# Patient Record
Sex: MALE | Race: BLACK OR AFRICAN AMERICAN | Employment: UNEMPLOYED | URBAN - METROPOLITAN AREA
[De-identification: names, ages, dates, MRNs, and addresses within clinical notes are randomized per-mention and may not be internally consistent; named-entity substitution may affect disease eponyms.]

---

## 2017-02-03 ENCOUNTER — HOSPITAL ENCOUNTER (INPATIENT)
Age: 67
LOS: 4 days | Discharge: HOME HEALTH | DRG: 861 | End: 2017-02-08
Attending: EMERGENCY MEDICINE | Admitting: FAMILY MEDICINE
Payer: MEDICAID

## 2017-02-03 ENCOUNTER — APPOINTMENT (OUTPATIENT)
Dept: GENERAL RADIOLOGY | Age: 67
DRG: 861 | End: 2017-02-03
Attending: EMERGENCY MEDICINE
Payer: MEDICAID

## 2017-02-03 ENCOUNTER — APPOINTMENT (OUTPATIENT)
Dept: CT IMAGING | Age: 67
DRG: 861 | End: 2017-02-03
Attending: EMERGENCY MEDICINE
Payer: MEDICAID

## 2017-02-03 DIAGNOSIS — R41.89 UNRESPONSIVENESS: Primary | ICD-10-CM

## 2017-02-03 LAB
ALBUMIN SERPL BCP-MCNC: 3.1 G/DL (ref 3.5–5)
ALBUMIN/GLOB SERPL: 0.7 {RATIO} (ref 1.1–2.2)
ALP SERPL-CCNC: 144 U/L (ref 45–117)
ALT SERPL-CCNC: 21 U/L (ref 12–78)
AMMONIA PLAS-SCNC: 21 UMOL/L
ANION GAP BLD CALC-SCNC: 9 MMOL/L (ref 5–15)
AST SERPL W P-5'-P-CCNC: 20 U/L (ref 15–37)
BASOPHILS # BLD AUTO: 0 K/UL (ref 0–0.1)
BASOPHILS # BLD: 0 % (ref 0–1)
BILIRUB SERPL-MCNC: 0.3 MG/DL (ref 0.2–1)
BUN SERPL-MCNC: 17 MG/DL (ref 6–20)
BUN/CREAT SERPL: 13 (ref 12–20)
CALCIUM SERPL-MCNC: 9 MG/DL (ref 8.5–10.1)
CHLORIDE SERPL-SCNC: 106 MMOL/L (ref 97–108)
CK SERPL-CCNC: 195 U/L (ref 39–308)
CO2 SERPL-SCNC: 26 MMOL/L (ref 21–32)
CREAT SERPL-MCNC: 1.29 MG/DL (ref 0.7–1.3)
EOSINOPHIL # BLD: 0.1 K/UL (ref 0–0.4)
EOSINOPHIL NFR BLD: 1 % (ref 0–7)
ERYTHROCYTE [DISTWIDTH] IN BLOOD BY AUTOMATED COUNT: 12.8 % (ref 11.5–14.5)
ETHANOL SERPL-MCNC: <10 MG/DL
GLOBULIN SER CALC-MCNC: 4.2 G/DL (ref 2–4)
GLUCOSE BLD STRIP.AUTO-MCNC: 120 MG/DL (ref 65–100)
GLUCOSE SERPL-MCNC: 128 MG/DL (ref 65–100)
HCT VFR BLD AUTO: 36.1 % (ref 36.6–50.3)
HGB BLD-MCNC: 12.1 G/DL (ref 12.1–17)
LACTATE SERPL-SCNC: 2.3 MMOL/L (ref 0.4–2)
LYMPHOCYTES # BLD AUTO: 34 % (ref 12–49)
LYMPHOCYTES # BLD: 3.6 K/UL (ref 0.8–3.5)
MCH RBC QN AUTO: 31.5 PG (ref 26–34)
MCHC RBC AUTO-ENTMCNC: 33.5 G/DL (ref 30–36.5)
MCV RBC AUTO: 94 FL (ref 80–99)
MONOCYTES # BLD: 1 K/UL (ref 0–1)
MONOCYTES NFR BLD AUTO: 10 % (ref 5–13)
NEUTS SEG # BLD: 5.6 K/UL (ref 1.8–8)
NEUTS SEG NFR BLD AUTO: 55 % (ref 32–75)
PLATELET # BLD AUTO: 151 K/UL (ref 150–400)
POTASSIUM SERPL-SCNC: 4.1 MMOL/L (ref 3.5–5.1)
PROT SERPL-MCNC: 7.3 G/DL (ref 6.4–8.2)
RBC # BLD AUTO: 3.84 M/UL (ref 4.1–5.7)
SERVICE CMNT-IMP: ABNORMAL
SODIUM SERPL-SCNC: 141 MMOL/L (ref 136–145)
TROPONIN I SERPL-MCNC: <0.04 NG/ML
VALPROATE SERPL-MCNC: 81 UG/ML (ref 50–100)
WBC # BLD AUTO: 10.4 K/UL (ref 4.1–11.1)

## 2017-02-03 PROCEDURE — 84484 ASSAY OF TROPONIN QUANT: CPT | Performed by: EMERGENCY MEDICINE

## 2017-02-03 PROCEDURE — 80307 DRUG TEST PRSMV CHEM ANLYZR: CPT | Performed by: EMERGENCY MEDICINE

## 2017-02-03 PROCEDURE — 85025 COMPLETE CBC W/AUTO DIFF WBC: CPT | Performed by: EMERGENCY MEDICINE

## 2017-02-03 PROCEDURE — 74011250636 HC RX REV CODE- 250/636: Performed by: EMERGENCY MEDICINE

## 2017-02-03 PROCEDURE — 96360 HYDRATION IV INFUSION INIT: CPT

## 2017-02-03 PROCEDURE — 80164 ASSAY DIPROPYLACETIC ACD TOT: CPT | Performed by: EMERGENCY MEDICINE

## 2017-02-03 PROCEDURE — 82962 GLUCOSE BLOOD TEST: CPT

## 2017-02-03 PROCEDURE — 70450 CT HEAD/BRAIN W/O DYE: CPT

## 2017-02-03 PROCEDURE — 82140 ASSAY OF AMMONIA: CPT | Performed by: EMERGENCY MEDICINE

## 2017-02-03 PROCEDURE — 83605 ASSAY OF LACTIC ACID: CPT | Performed by: EMERGENCY MEDICINE

## 2017-02-03 PROCEDURE — 36415 COLL VENOUS BLD VENIPUNCTURE: CPT | Performed by: EMERGENCY MEDICINE

## 2017-02-03 PROCEDURE — 81001 URINALYSIS AUTO W/SCOPE: CPT | Performed by: EMERGENCY MEDICINE

## 2017-02-03 PROCEDURE — 93005 ELECTROCARDIOGRAM TRACING: CPT

## 2017-02-03 PROCEDURE — 82550 ASSAY OF CK (CPK): CPT | Performed by: EMERGENCY MEDICINE

## 2017-02-03 PROCEDURE — 99285 EMERGENCY DEPT VISIT HI MDM: CPT

## 2017-02-03 PROCEDURE — 96361 HYDRATE IV INFUSION ADD-ON: CPT

## 2017-02-03 PROCEDURE — 71010 XR CHEST PORT: CPT

## 2017-02-03 PROCEDURE — 80053 COMPREHEN METABOLIC PANEL: CPT | Performed by: EMERGENCY MEDICINE

## 2017-02-03 RX ADMIN — SODIUM CHLORIDE 1000 ML: 900 INJECTION, SOLUTION INTRAVENOUS at 20:50

## 2017-02-03 NOTE — IP AVS SNAPSHOT
[address] 
[phone] Patient: Margarita Ortega MRN: [id number] XTZ:8[id number] You are allergic to the following Allergen Reactions Thorazine (Chlorpromazine) Other (comments) \"burns head and throat\" Recent Documentation Height Weight BMI Smoking Status 1.727 m 85 kg 28.49 kg/m2 Current Every Day Smoker Emergency Contacts Name Discharge Info Relation Home Work Mobile [address]  CAREGIVER [3] Sister [23] [phone] Princess Rod  Other Relative [6]   [phone] Fabrizio Lara  Brother [24]   [phone] Martell Byrnes (In [place])  Sister [23]   [phone] About your hospitalization You were admitted on:  February 4, 2017 You last received care in the:  [facility] 6S NEURO-SCI TELE You were discharged on:  February 8, 2017 Unit phone number:  [phone] Why you were hospitalized Your primary diagnosis was: Altered Mental Status Your diagnoses also included:  Paranoid Schizophrenia (Hcc), Htn (Hypertension), Hld (Hyperlipidemia), Anemia, Unspecified, Diabetes Mellitus (Hcc), Seizure (Hcc) Providers Seen During Your Hospitalizations Provider Role Specialty Primary office phone Shante Massed. Quinton Vila MD Attending Provider Emergency Medicine [phone] Virgie Pantoja MD Attending Provider [facility] [phone] Nikole Young MD Attending Provider Internal Medicine [phone] Moises Case MD Attending Provider Internal Medicine [phone] Stefan Ferrell MD Attending Provider Internal Medicine [phone] Your Primary Care Physician (PCP) Primary Care Physician Office Phone Office Fax Beba Bridges [phone] Follow-up Information Follow up With Details Comments Contact Info [address] [facility] [address] 
[phone] Ag Kaye MD In 1 week  [address] [facility] [address] 
[phone] Today Current Discharge Medication List  
  
CONTINUE these medications which have NOT CHANGED Dose & Instructions Dispensing Information Comments Morning Noon Evening Bedtime  
 amLODIPine 10 mg tablet Commonly known as:  Izetta Harder Your next dose is: Today, Tomorrow Other:  _________ Dose:  10 mg Take 10 mg by mouth daily. Refills:  0  
     
   
   
   
  
 benztropine 1 mg tablet Commonly known as:  COGENTIN Your next dose is: Today, Tomorrow Other:  _________ Dose:  1 mg Take 1 mg by mouth every twelve (12) hours. Refills:  0  
     
   
   
   
  
 bisacodyl 10 mg suppository Commonly known as:  DULCOLAX Your next dose is: Today, Tomorrow Other:  _________ Dose:  10 mg Insert 10 mg into rectum daily as needed for Other (constipation). Quantity:  15 Suppository Refills:  0  
     
   
   
   
  
 COLACE 100 mg capsule Generic drug:  docusate sodium Your next dose is: Today, Tomorrow Other:  _________ Dose:  100 mg Take 100 mg by mouth daily. Refills:  0 DITROPAN XL 10 mg CR tablet Generic drug:  oxybutynin chloride XL Your next dose is: Today, Tomorrow Other:  _________ Dose:  10 mg Take 10 mg by mouth daily. Refills:  0  
     
   
   
   
  
 divalproex  mg ER tablet Commonly known as:  DEPAKOTE ER Your next dose is: Today, Tomorrow Other:  _________ Dose:  1000 mg Take 2 Tabs by mouth daily. Quantity:  90 Tab Refills:  0  
     
   
   
   
  
 ferrous sulfate 325 mg (65 mg iron) tablet Your next dose is: Today, Tomorrow Other:  _________ Dose:  325 mg Take 325 mg by mouth two (2) times a day. Refills:  0  
     
   
   
   
  
 FLOMAX 0.4 mg capsule Generic drug:  tamsulosin Your next dose is: Today, Tomorrow Other:  _________ Dose:  0.4 mg Take 0.4 mg by mouth daily. Refills:  0  
     
   
   
   
  
 haloperidol 5 mg tablet Commonly known as:  HALDOL Your next dose is: Today, Tomorrow Other:  _________ Dose:  5 mg Take 5 mg by mouth every twelve (12) hours. Refills:  0  
     
   
   
   
  
 insulin glargine 100 unit/mL injection Commonly known as:  LANTUS Your next dose is: Today, Tomorrow Other:  _________ Dose:  20 Units 20 Units by SubCUTAneous route nightly. Indications: TYPE 2 DIABETES MELLITUS Quantity:  1 Vial  
Refills:  0  
     
   
   
   
  
 lamoTRIgine 25 mg tablet Commonly known as: LaMICtal  
   
Your next dose is: Today, Tomorrow Other:  _________ Dose:  50 mg Take 50 mg by mouth two (2) times a day. Refills:  0  
     
   
   
   
  
 lisinopril 40 mg tablet Commonly known as:  Aundra Lloyd Your next dose is: Today, Tomorrow Other:  _________ Dose:  40 mg Take 40 mg by mouth daily. Refills:  0  
     
   
   
   
  
 metFORMIN 500 mg tablet Commonly known as:  GLUCOPHAGE Your next dose is: Today, Tomorrow Other:  _________ Dose:  500 mg Take 500 mg by mouth daily (with breakfast). Refills:  0  
     
   
   
   
  
 metoprolol tartrate 50 mg tablet Commonly known as:  LOPRESSOR Your next dose is: Today, Tomorrow Other:  _________ Dose:  75 mg Take 75 mg by mouth two (2) times a day. Refills:  0  
     
   
   
   
  
 multivitamin tablet Commonly known as:  ONE A DAY Your next dose is: Today, Tomorrow Other:  _________ Dose:  1 Tab Take 1 Tab by mouth daily. Refills:  0 OXcarbazepine 150 mg tablet Commonly known as:  TRILEPTAL Your next dose is: Today, Tomorrow Other:  _________ Dose:  150 mg Take 150 mg by mouth two (2) times a day. Refills:  0  
     
   
   
   
  
 pantoprazole 40 mg tablet Commonly known as:  PROTONIX Your next dose is: Today, Tomorrow Other:  _________ Dose:  40 mg Take 40 mg by mouth two (2) times a day. Refills:  0  
     
   
   
   
  
 * QUEtiapine 100 mg tablet Commonly known as:  SEROquel Your next dose is: Today, Tomorrow Other:  _________ Dose:  50 mg Take 50 mg by mouth daily. Refills:  0  
     
   
   
   
  
 * QUEtiapine 100 mg tablet Commonly known as:  SEROquel Your next dose is: Today, Tomorrow Other:  _________ Dose:  200 mg Take 200 mg by mouth nightly. Refills:  0  
     
   
   
   
  
 simvastatin 20 mg tablet Commonly known as:  ZOCOR Your next dose is: Today, Tomorrow Other:  _________ Dose:  20 mg Take 20 mg by mouth nightly. Refills:  0  
     
   
   
   
  
 * Notice: This list has 2 medication(s) that are the same as other medications prescribed for you. Read the directions carefully, and ask your doctor or other care provider to review them with you. Discharge Instructions Discharge Instructions PATIENT ID: Alvarado Laureano MRN: [id number] YOB: 1950 DATE OF ADMISSION: 2/3/2017  8:38 PM   
DATE OF DISCHARGE: 2/8/2017 PRIMARY CARE PROVIDER: Malaika Wise MD  
 
ATTENDING PHYSICIAN: Abby Hendrix MD 
DISCHARGING PROVIDER: Abby Hendrix MD   
To contact this individual call [phone] and ask the  to page. If unavailable ask to be transferred the Adult Hospitalist Department. DISCHARGE DIAGNOSES Altered mental status Seizure disorder DM-2 HTN 
  COPD Prior history of CVA CONSULTATIONS: IP CONSULT TO HOSPITALIST IP CONSULT TO NEUROLOGY PROCEDURES/SURGERIES: * No surgery found * PENDING TEST RESULTS:  
At the time of discharge the following test results are still pending: None. FOLLOW UP APPOINTMENTS:  
Follow-up Information Follow up With Details Comments Contact Info [address] [address] 
[phone] Lucía Bajwa MD In 1 week  [address] [facility] [address] 
[phone] Today ADDITIONAL CARE RECOMMENDATIONS: None 
  
DIET: Cardiac Diet and Diabetic Diet 
  
ACTIVITY: Activity as tolerated. No driving. 
  
WOUND CARE: None 
  
EQUIPMENT needed: None DISCHARGE MEDICATIONS: 
 See Medication Reconciliation Form · It is important that you take the medication exactly as they are prescribed. · Keep your medication in the bottles provided by the pharmacist and keep a list of the medication names, dosages, and times to be taken in your wallet. · Do not take other medications without consulting your doctor. NOTIFY YOUR PHYSICIAN FOR ANY OF THE FOLLOWING:  
Fever over 101 degrees for 24 hours. Chest pain, shortness of breath, fever, chills, nausea, vomiting, diarrhea, change in mentation, falling, weakness, bleeding. Severe pain or pain not relieved by medications. Or, any other signs or symptoms that you may have questions about. DISPOSITION: 
 Home With: 
 OT  PT  [place]  RN  
  
 SNF/Inpatient Rehab/LTAC Independent/assisted living Hospice  
x Other:  Group home CDMP Checked:  
Yes x PROBLEM LIST Updated: 
Yes x Signed:  
Fabiola Smith MD 
2/8/2017 
10:16 AM 
 
Discharge Orders None [facility] Announcement We are excited to announce that we are making your provider's discharge notes available to you in [facility].   You will see these notes when they are completed and signed by the physician that discharged you from your recent hospital stay. If you have any questions or concerns about any information you see in [facility], please call the Health Information Department where you were seen or reach out to your Primary Care Provider for more information about your plan of care. Introducing [facility]! [employer] introduces [facility] Connect patient portal. Now you can access parts of your medical record, email your doctor's office, and request medication refills online. 1. In your internet browser, go to [email or web address] 2. Click on the First Time User? Click Here link in the Sign In box. You will see the New Member Sign Up page. 3. Enter your [facility] Access Code exactly as it appears below. You will not need to use this code after youve completed the sign-up process. If you do not sign up before the expiration date, you must request a new code. · [facility] Access Code: [email or web address] Expires: 5/4/2017  8:58 PM 
 
4. Enter the last four digits of your Social Security Number (xxxx) and Date of Birth (mm/dd/yyyy) as indicated and click Submit. You will be taken to the next sign-up page. 5. Create a Care Team Connect ID. This will be your Care Team Connect login ID and cannot be changed, so think of one that is secure and easy to remember. 6. Create a [facility] password. You can change your password at any time. 7. Enter your Password Reset Question and Answer. This can be used at a later time if you forget your password. 8. Enter your e-mail address. You will receive e-mail notification when new information is available in [address] 9. Click Sign Up. You can now view and download portions of your medical record. 10. Click the Download Summary menu link to download a portable copy of your medical information. If you have questions, please visit the Frequently Asked Questions section of the [facility] website. Remember, [facility] Connect is NOT to be used for urgent needs. For medical emergencies, dial 911. Now available from your iPhone and Android! General Information Please provide this summary of care documentation to your next provider. Patient Signature:  ____________________________________________________________ Date:  ____________________________________________________________  
  
Althia Kras Provider Signature:  ____________________________________________________________ Date:  ____________________________________________________________

## 2017-02-03 NOTE — IP AVS SNAPSHOT
Current Discharge Medication List  
  
Take these medications at their scheduled times Dose & Instructions Dispensing Information Comments Morning Noon Evening Bedtime  
 amLODIPine 10 mg tablet Commonly known as:  Yosvany Duarte Your next dose is: Today, Tomorrow Other:  ____________ Dose:  10 mg Take 10 mg by mouth daily. Refills:  0  
     
   
   
   
  
 benztropine 1 mg tablet Commonly known as:  COGENTIN Your next dose is: Today, Tomorrow Other:  ____________ Dose:  1 mg Take 1 mg by mouth every twelve (12) hours. Refills:  0  
     
   
   
   
  
 COLACE 100 mg capsule Generic drug:  docusate sodium Your next dose is: Today, Tomorrow Other:  ____________ Dose:  100 mg Take 100 mg by mouth daily. Refills:  0 DITROPAN XL 10 mg CR tablet Generic drug:  oxybutynin chloride XL Your next dose is: Today, Tomorrow Other:  ____________ Dose:  10 mg Take 10 mg by mouth daily. Refills:  0  
     
   
   
   
  
 divalproex  mg ER tablet Commonly known as:  DEPAKOTE ER Your next dose is: Today, Tomorrow Other:  ____________ Dose:  1000 mg Take 2 Tabs by mouth daily. Quantity:  90 Tab Refills:  0  
     
   
   
   
  
 ferrous sulfate 325 mg (65 mg iron) tablet Your next dose is: Today, Tomorrow Other:  ____________ Dose:  325 mg Take 325 mg by mouth two (2) times a day. Refills:  0  
     
   
   
   
  
 FLOMAX 0.4 mg capsule Generic drug:  tamsulosin Your next dose is: Today, Tomorrow Other:  ____________ Dose:  0.4 mg Take 0.4 mg by mouth daily. Refills:  0  
     
   
   
   
  
 haloperidol 5 mg tablet Commonly known as:  HALDOL Your next dose is: Today, Tomorrow Other:  ____________ Dose:  5 mg Take 5 mg by mouth every twelve (12) hours. Refills:  0  
     
   
   
   
  
 insulin glargine 100 unit/mL injection Commonly known as:  LANTUS Your next dose is: Today, Tomorrow Other:  ____________ Dose:  20 Units 20 Units by SubCUTAneous route nightly. Indications: TYPE 2 DIABETES MELLITUS Quantity:  1 Vial  
Refills:  0  
     
   
   
   
  
 lamoTRIgine 25 mg tablet Commonly known as: LaMICtal  
   
Your next dose is: Today, Tomorrow Other:  ____________ Dose:  50 mg Take 50 mg by mouth two (2) times a day. Refills:  0  
     
   
   
   
  
 lisinopril 40 mg tablet Commonly known as:  Rehan Fan Your next dose is: Today, Tomorrow Other:  ____________ Dose:  40 mg Take 40 mg by mouth daily. Refills:  0  
     
   
   
   
  
 metFORMIN 500 mg tablet Commonly known as:  GLUCOPHAGE Your next dose is: Today, Tomorrow Other:  ____________ Dose:  500 mg Take 500 mg by mouth daily (with breakfast). Refills:  0  
     
   
   
   
  
 metoprolol tartrate 50 mg tablet Commonly known as:  LOPRESSOR Your next dose is: Today, Tomorrow Other:  ____________ Dose:  75 mg Take 75 mg by mouth two (2) times a day. Refills:  0  
     
   
   
   
  
 multivitamin tablet Commonly known as:  ONE A DAY Your next dose is: Today, Tomorrow Other:  ____________ Dose:  1 Tab Take 1 Tab by mouth daily. Refills:  0 OXcarbazepine 150 mg tablet Commonly known as:  TRILEPTAL Your next dose is: Today, Tomorrow Other:  ____________ Dose:  150 mg Take 150 mg by mouth two (2) times a day. Refills:  0  
     
   
   
   
  
 pantoprazole 40 mg tablet Commonly known as:  PROTONIX Your next dose is: Today, Tomorrow Other:  ____________  Dose:  40 mg  
 Take 40 mg by mouth two (2) times a day. Refills:  0  
     
   
   
   
  
 * QUEtiapine 100 mg tablet Commonly known as:  SEROquel Your next dose is: Today, Tomorrow Other:  ____________ Dose:  50 mg Take 50 mg by mouth daily. Refills:  0  
     
   
   
   
  
 * QUEtiapine 100 mg tablet Commonly known as:  SEROquel Your next dose is: Today, Tomorrow Other:  ____________ Dose:  200 mg Take 200 mg by mouth nightly. Refills:  0  
     
   
   
   
  
 simvastatin 20 mg tablet Commonly known as:  ZOCOR Your next dose is: Today, Tomorrow Other:  ____________ Dose:  20 mg Take 20 mg by mouth nightly. Refills:  0  
     
   
   
   
  
 * Notice: This list has 2 medication(s) that are the same as other medications prescribed for you. Read the directions carefully, and ask your doctor or other care provider to review them with you. Take these medications as needed Dose & Instructions Dispensing Information Comments Morning Noon Evening Bedtime  
 bisacodyl 10 mg suppository Commonly known as:  DULCOLAX Your next dose is: Today, Tomorrow Other:  ____________ Dose:  10 mg Insert 10 mg into rectum daily as needed for Other (constipation). Quantity:  15 Suppository Refills:  0

## 2017-02-04 PROBLEM — R41.82 ALTERED MENTAL STATUS: Status: ACTIVE | Noted: 2017-02-04

## 2017-02-04 LAB
AMMONIA PLAS-SCNC: 22 UMOL/L
AMPHET UR QL SCN: NEGATIVE
ANION GAP BLD CALC-SCNC: 9 MMOL/L (ref 5–15)
APAP SERPL-MCNC: 6 UG/ML (ref 10–30)
APPEARANCE UR: CLEAR
ATRIAL RATE: 66 BPM
BACTERIA URNS QL MICRO: NEGATIVE /HPF
BARBITURATES UR QL SCN: NEGATIVE
BASOPHILS # BLD AUTO: 0 K/UL (ref 0–0.1)
BASOPHILS # BLD: 0 % (ref 0–1)
BENZODIAZ UR QL: NEGATIVE
BILIRUB UR QL: NEGATIVE
BUN SERPL-MCNC: 18 MG/DL (ref 6–20)
BUN/CREAT SERPL: 16 (ref 12–20)
CALCIUM SERPL-MCNC: 8.9 MG/DL (ref 8.5–10.1)
CALCULATED P AXIS, ECG09: 47 DEGREES
CALCULATED R AXIS, ECG10: 43 DEGREES
CALCULATED T AXIS, ECG11: 63 DEGREES
CANNABINOIDS UR QL SCN: NEGATIVE
CHLORIDE SERPL-SCNC: 109 MMOL/L (ref 97–108)
CHOLEST SERPL-MCNC: 109 MG/DL
CO2 SERPL-SCNC: 28 MMOL/L (ref 21–32)
COCAINE UR QL SCN: NEGATIVE
COLOR UR: NORMAL
CREAT SERPL-MCNC: 1.13 MG/DL (ref 0.7–1.3)
DIAGNOSIS, 93000: NORMAL
DRUG SCRN COMMENT,DRGCM: NORMAL
EOSINOPHIL # BLD: 0.1 K/UL (ref 0–0.4)
EOSINOPHIL NFR BLD: 1 % (ref 0–7)
EPITH CASTS URNS QL MICRO: NORMAL /LPF
ERYTHROCYTE [DISTWIDTH] IN BLOOD BY AUTOMATED COUNT: 13 % (ref 11.5–14.5)
EST. AVERAGE GLUCOSE BLD GHB EST-MCNC: 120 MG/DL
GLUCOSE BLD STRIP.AUTO-MCNC: 103 MG/DL (ref 65–100)
GLUCOSE BLD STRIP.AUTO-MCNC: 135 MG/DL (ref 65–100)
GLUCOSE BLD STRIP.AUTO-MCNC: 143 MG/DL (ref 65–100)
GLUCOSE BLD STRIP.AUTO-MCNC: 60 MG/DL (ref 65–100)
GLUCOSE BLD STRIP.AUTO-MCNC: 76 MG/DL (ref 65–100)
GLUCOSE SERPL-MCNC: 144 MG/DL (ref 65–100)
GLUCOSE UR STRIP.AUTO-MCNC: NEGATIVE MG/DL
HBA1C MFR BLD: 5.8 % (ref 4.2–6.3)
HCT VFR BLD AUTO: 37.6 % (ref 36.6–50.3)
HDLC SERPL-MCNC: 39 MG/DL
HDLC SERPL: 2.8 {RATIO} (ref 0–5)
HGB BLD-MCNC: 12.4 G/DL (ref 12.1–17)
HGB UR QL STRIP: NEGATIVE
HYALINE CASTS URNS QL MICRO: NORMAL /LPF (ref 0–5)
KETONES UR QL STRIP.AUTO: NEGATIVE MG/DL
LACTATE SERPL-SCNC: 1.9 MMOL/L (ref 0.4–2)
LDLC SERPL CALC-MCNC: 50.8 MG/DL (ref 0–100)
LEUKOCYTE ESTERASE UR QL STRIP.AUTO: NEGATIVE
LIPID PROFILE,FLP: NORMAL
LYMPHOCYTES # BLD AUTO: 34 % (ref 12–49)
LYMPHOCYTES # BLD: 3.3 K/UL (ref 0.8–3.5)
MAGNESIUM SERPL-MCNC: 1.7 MG/DL (ref 1.6–2.4)
MCH RBC QN AUTO: 31 PG (ref 26–34)
MCHC RBC AUTO-ENTMCNC: 33 G/DL (ref 30–36.5)
MCV RBC AUTO: 94 FL (ref 80–99)
METHADONE UR QL: NEGATIVE
MONOCYTES # BLD: 0.8 K/UL (ref 0–1)
MONOCYTES NFR BLD AUTO: 8 % (ref 5–13)
NEUTS SEG # BLD: 5.6 K/UL (ref 1.8–8)
NEUTS SEG NFR BLD AUTO: 57 % (ref 32–75)
NITRITE UR QL STRIP.AUTO: NEGATIVE
OPIATES UR QL: NEGATIVE
P-R INTERVAL, ECG05: 116 MS
PCP UR QL: NEGATIVE
PH UR STRIP: 5.5 [PH] (ref 5–8)
PHOSPHATE SERPL-MCNC: 3.1 MG/DL (ref 2.6–4.7)
PLATELET # BLD AUTO: 162 K/UL (ref 150–400)
POTASSIUM SERPL-SCNC: 4 MMOL/L (ref 3.5–5.1)
PROT UR STRIP-MCNC: NEGATIVE MG/DL
Q-T INTERVAL, ECG07: 444 MS
QRS DURATION, ECG06: 74 MS
QTC CALCULATION (BEZET), ECG08: 465 MS
RBC # BLD AUTO: 4 M/UL (ref 4.1–5.7)
RBC #/AREA URNS HPF: NORMAL /HPF (ref 0–5)
SALICYLATES SERPL-MCNC: <1.7 MG/DL (ref 2.8–20)
SERVICE CMNT-IMP: ABNORMAL
SERVICE CMNT-IMP: NORMAL
SODIUM SERPL-SCNC: 146 MMOL/L (ref 136–145)
SP GR UR REFRACTOMETRY: 1.01 (ref 1–1.03)
T4 FREE SERPL-MCNC: 1.2 NG/DL (ref 0.8–1.5)
TRIGL SERPL-MCNC: 96 MG/DL (ref ?–150)
TROPONIN I SERPL-MCNC: <0.04 NG/ML
TSH SERPL DL<=0.05 MIU/L-ACNC: 1.18 UIU/ML (ref 0.36–3.74)
UROBILINOGEN UR QL STRIP.AUTO: 1 EU/DL (ref 0.2–1)
VENTRICULAR RATE, ECG03: 66 BPM
VLDLC SERPL CALC-MCNC: 19.2 MG/DL
WBC # BLD AUTO: 9.7 K/UL (ref 4.1–11.1)
WBC URNS QL MICRO: NORMAL /HPF (ref 0–4)

## 2017-02-04 PROCEDURE — 83605 ASSAY OF LACTIC ACID: CPT | Performed by: HOSPITALIST

## 2017-02-04 PROCEDURE — 36415 COLL VENOUS BLD VENIPUNCTURE: CPT | Performed by: FAMILY MEDICINE

## 2017-02-04 PROCEDURE — 83735 ASSAY OF MAGNESIUM: CPT | Performed by: FAMILY MEDICINE

## 2017-02-04 PROCEDURE — 97535 SELF CARE MNGMENT TRAINING: CPT

## 2017-02-04 PROCEDURE — 84484 ASSAY OF TROPONIN QUANT: CPT | Performed by: FAMILY MEDICINE

## 2017-02-04 PROCEDURE — 84100 ASSAY OF PHOSPHORUS: CPT | Performed by: FAMILY MEDICINE

## 2017-02-04 PROCEDURE — 80061 LIPID PANEL: CPT | Performed by: FAMILY MEDICINE

## 2017-02-04 PROCEDURE — 65660000000 HC RM CCU STEPDOWN

## 2017-02-04 PROCEDURE — 82962 GLUCOSE BLOOD TEST: CPT

## 2017-02-04 PROCEDURE — 74011250636 HC RX REV CODE- 250/636: Performed by: FAMILY MEDICINE

## 2017-02-04 PROCEDURE — 84439 ASSAY OF FREE THYROXINE: CPT | Performed by: FAMILY MEDICINE

## 2017-02-04 PROCEDURE — 80048 BASIC METABOLIC PNL TOTAL CA: CPT | Performed by: FAMILY MEDICINE

## 2017-02-04 PROCEDURE — 83036 HEMOGLOBIN GLYCOSYLATED A1C: CPT | Performed by: FAMILY MEDICINE

## 2017-02-04 PROCEDURE — 74011000250 HC RX REV CODE- 250: Performed by: FAMILY MEDICINE

## 2017-02-04 PROCEDURE — 82140 ASSAY OF AMMONIA: CPT | Performed by: FAMILY MEDICINE

## 2017-02-04 PROCEDURE — 97165 OT EVAL LOW COMPLEX 30 MIN: CPT

## 2017-02-04 PROCEDURE — 84443 ASSAY THYROID STIM HORMONE: CPT | Performed by: FAMILY MEDICINE

## 2017-02-04 PROCEDURE — 85025 COMPLETE CBC W/AUTO DIFF WBC: CPT | Performed by: FAMILY MEDICINE

## 2017-02-04 PROCEDURE — 95816 EEG AWAKE AND DROWSY: CPT | Performed by: INTERNAL MEDICINE

## 2017-02-04 PROCEDURE — 74011000258 HC RX REV CODE- 258: Performed by: INTERNAL MEDICINE

## 2017-02-04 PROCEDURE — 80307 DRUG TEST PRSMV CHEM ANLYZR: CPT | Performed by: FAMILY MEDICINE

## 2017-02-04 PROCEDURE — 74011250637 HC RX REV CODE- 250/637: Performed by: INTERNAL MEDICINE

## 2017-02-04 PROCEDURE — 97162 PT EVAL MOD COMPLEX 30 MIN: CPT

## 2017-02-04 RX ORDER — AMLODIPINE BESYLATE 5 MG/1
10 TABLET ORAL DAILY
Status: DISCONTINUED | OUTPATIENT
Start: 2017-02-04 | End: 2017-02-08 | Stop reason: HOSPADM

## 2017-02-04 RX ORDER — QUETIAPINE FUMARATE 100 MG/1
200 TABLET, FILM COATED ORAL
Status: DISCONTINUED | OUTPATIENT
Start: 2017-02-04 | End: 2017-02-04

## 2017-02-04 RX ORDER — INSULIN LISPRO 100 [IU]/ML
INJECTION, SOLUTION INTRAVENOUS; SUBCUTANEOUS
Status: DISCONTINUED | OUTPATIENT
Start: 2017-02-04 | End: 2017-02-08 | Stop reason: HOSPADM

## 2017-02-04 RX ORDER — SIMVASTATIN 20 MG/1
20 TABLET, FILM COATED ORAL
Status: DISCONTINUED | OUTPATIENT
Start: 2017-02-04 | End: 2017-02-08 | Stop reason: HOSPADM

## 2017-02-04 RX ORDER — SODIUM CHLORIDE 0.9 % (FLUSH) 0.9 %
5-10 SYRINGE (ML) INJECTION EVERY 8 HOURS
Status: DISCONTINUED | OUTPATIENT
Start: 2017-02-04 | End: 2017-02-08 | Stop reason: HOSPADM

## 2017-02-04 RX ORDER — LISINOPRIL 10 MG/1
40 TABLET ORAL DAILY
Status: DISCONTINUED | OUTPATIENT
Start: 2017-02-05 | End: 2017-02-08 | Stop reason: HOSPADM

## 2017-02-04 RX ORDER — SODIUM CHLORIDE 9 MG/ML
125 INJECTION, SOLUTION INTRAVENOUS CONTINUOUS
Status: DISCONTINUED | OUTPATIENT
Start: 2017-02-04 | End: 2017-02-04

## 2017-02-04 RX ORDER — LAMOTRIGINE 25 MG/1
50 TABLET ORAL 2 TIMES DAILY
Status: DISCONTINUED | OUTPATIENT
Start: 2017-02-04 | End: 2017-02-08 | Stop reason: HOSPADM

## 2017-02-04 RX ORDER — MAGNESIUM SULFATE 100 %
4 CRYSTALS MISCELLANEOUS AS NEEDED
Status: DISCONTINUED | OUTPATIENT
Start: 2017-02-04 | End: 2017-02-08 | Stop reason: HOSPADM

## 2017-02-04 RX ORDER — SODIUM CHLORIDE 450 MG/100ML
75 INJECTION, SOLUTION INTRAVENOUS CONTINUOUS
Status: DISCONTINUED | OUTPATIENT
Start: 2017-02-04 | End: 2017-02-08

## 2017-02-04 RX ORDER — QUETIAPINE FUMARATE 25 MG/1
50 TABLET, FILM COATED ORAL DAILY
Status: DISCONTINUED | OUTPATIENT
Start: 2017-02-04 | End: 2017-02-06

## 2017-02-04 RX ORDER — OXCARBAZEPINE 150 MG/1
150 TABLET, FILM COATED ORAL 2 TIMES DAILY
Status: DISCONTINUED | OUTPATIENT
Start: 2017-02-04 | End: 2017-02-08 | Stop reason: HOSPADM

## 2017-02-04 RX ORDER — SODIUM CHLORIDE 0.9 % (FLUSH) 0.9 %
5-10 SYRINGE (ML) INJECTION AS NEEDED
Status: DISCONTINUED | OUTPATIENT
Start: 2017-02-04 | End: 2017-02-08 | Stop reason: HOSPADM

## 2017-02-04 RX ORDER — DIVALPROEX SODIUM 500 MG/1
1000 TABLET, EXTENDED RELEASE ORAL DAILY
Status: DISCONTINUED | OUTPATIENT
Start: 2017-02-04 | End: 2017-02-08 | Stop reason: HOSPADM

## 2017-02-04 RX ORDER — DEXTROSE 50 % IN WATER (D50W) INTRAVENOUS SYRINGE
12.5-25 AS NEEDED
Status: DISCONTINUED | OUTPATIENT
Start: 2017-02-04 | End: 2017-02-08 | Stop reason: HOSPADM

## 2017-02-04 RX ADMIN — OXCARBAZEPINE 150 MG: 150 TABLET, FILM COATED ORAL at 12:47

## 2017-02-04 RX ADMIN — LAMOTRIGINE 50 MG: 25 TABLET ORAL at 12:46

## 2017-02-04 RX ADMIN — OXCARBAZEPINE 150 MG: 150 TABLET, FILM COATED ORAL at 20:24

## 2017-02-04 RX ADMIN — SODIUM CHLORIDE 75 ML/HR: 450 INJECTION, SOLUTION INTRAVENOUS at 17:20

## 2017-02-04 RX ADMIN — SIMVASTATIN 20 MG: 20 TABLET, FILM COATED ORAL at 23:32

## 2017-02-04 RX ADMIN — METOPROLOL TARTRATE 75 MG: 50 TABLET ORAL at 20:23

## 2017-02-04 RX ADMIN — DIVALPROEX SODIUM 1000 MG: 500 TABLET, FILM COATED, EXTENDED RELEASE ORAL at 12:49

## 2017-02-04 RX ADMIN — AMLODIPINE BESYLATE 10 MG: 5 TABLET ORAL at 12:46

## 2017-02-04 RX ADMIN — LAMOTRIGINE 50 MG: 25 TABLET ORAL at 20:23

## 2017-02-04 RX ADMIN — QUETIAPINE FUMARATE 50 MG: 25 TABLET, FILM COATED ORAL at 12:47

## 2017-02-04 RX ADMIN — Medication 10 ML: at 06:44

## 2017-02-04 RX ADMIN — Medication 5 ML: at 14:00

## 2017-02-04 RX ADMIN — DEXTROSE MONOHYDRATE 25 G: 25 INJECTION, SOLUTION INTRAVENOUS at 12:40

## 2017-02-04 RX ADMIN — SODIUM CHLORIDE 125 ML/HR: 900 INJECTION, SOLUTION INTRAVENOUS at 03:00

## 2017-02-04 NOTE — PROGRESS NOTES
Primary Nurse Carlos Farmer RN and Melina Maria RN performed a dual skin assessment on this patient No impairment noted  Buster score is 17    Bedside and Verbal shift change report given to Lawrence Nunes (oncoming nurse) by Aleah James (offgoing nurse). Report included the following information SBAR, Kardex, Intake/Output, MAR and Recent Results.

## 2017-02-04 NOTE — H&P
[address] [facility] [place] [address], [address]   HISTORY AND PHYSICAL       Name:  Ayesha Cortez   MR#:  [id number]   :  1950   Account #:  [de-identified]        Date of Adm:  2017       CHIEF COMPLAINT: The patient does not provide. HISTORY OF PRESENT ILLNESS: A 40-year-old    male with past medical history of type 2 diabetes mellitus, esophagitis,   GERD, GI bleed, anemia, chronic pain, mood disorder, COPD,   hypertension, tobacco abuse, schizophrenia, seizures, stroke,   presented to the emergency department via EMS from home with   reported altered mental status. The patient is a poor historian. Majority   of history was obtained from review of ED and medical reports. Per the   collective reports, EMS was called to the scene as the patient's family   found the patient slumped over with food in his mouth. The patient's   family reportedly removed the food from the patient's mouth. EMS   arrived at the scene and found the patient unresponsive and only   moaning. He reportedly had some increased responsiveness while en   route; however, still was sluggish, hypotensive with blood pressure   80/59 bradycardic with heart rate of 54 beats per minute. On arrival in   the emergency department, he is reported disoriented. Initially, there   were some concerns for seizures per the family, which notably is   without shaking. There was no reported definite seizures noted today. According to the ER reports, the patient had complaints of some back   pain. There is no definite reports of any slurred speech, facial droop,   focal weakness, recent falls, head or neck trauma. No complaints of   chest pain, shortness of breath, cough, congestion, abdominal pain,   nausea, vomiting, diarrhea, melena, dysuria, hematuria, calf pain,   swelling, edema, or other constitutional symptoms other than those   mentioned. There are no reports of fever or chills.  On arrival in the emergency department, initial recorded vital signs were blood pressure   102/68, heart rate 66, respiratory rate 18, saturation 100% on room air. Labs showed elevated lactic acid 2.3. Per the ED, the patient was   given 0.9% normal saline 1000 mL IV fluid bolus x1. Workup included   CT of the head without contrast, which showed no acute intracranial   abnormalities. Hospitalist was then called to admit the patient to the   medical service for continued evaluation and treatment. PAST MEDICAL HISTORY:   1. Type 2 diabetes mellitus, esophagitis, GERD, GI bleed - requiring   blood transfusion, 02/2016.   2. Anemia, COPD, tobacco abuse, mood disorder, schizophrenia,   chronic pain, hypertension, seizure disorder, stroke. PAST SURGICAL HISTORY:   1. EGD on 03/11/2010 showing evidence of grade 4 erosive   esophagitis. 2. Screening colonoscopy 03/11/2010, no gross abnormalities;   however, was a poor prep. 3. Colonoscopy 01/06/2012 showed diverticulosis. 4. Upper gastrointestinal endoscopy with biopsy 02/08/2016 showed   esophagitis and gastritis. MEDICATIONS:   1. Amlodipine 10 mg p.o. daily. 2. Benztropine 1 mg p.o. b.i.d.   3. Dulcolax 10 mg suppository per rectum daily p.r.n.   4. Depakote  mg 2 tablets p.o. daily. 5. Colace 100 mg p.o. daily. 6. Ferrous sulfate 325 mg p.o. b.i.d.   7. Haldol 5 mg p.o. q.12h.   8. Lantus 20 units subcutaneous at bedtime. 9. Lamictal 50 mg p.o. b.i.d. 10. Lisinopril 40 mg p.o. daily. 11. Metformin 500 mg p.o. daily. 12. Metoprolol 75 mg p.o. b.i.d.   13. Multivitamin 1 tablet p.o. daily. 14. Trileptal 150 mg p.o. b.i.d.   15. Ditropan XL 10 mg p.o. daily. 16. Seroquel 150 mg p.o. daily. 17. Seroquel 200 mg p.o. at bedtime. 18. Simvastatin 20 mg p.o. at bedtime. 19. Flomax 0.4 mg p.o. daily. ALLERGIES: THORAZINE. SOCIAL HISTORY: Smokes cigarettes, approximately quarter a pack a   day. Positive for rare alcohol.  No reports of illicit drugs.    FAMILY HISTORY: Unknown, unable to obtain from the patient, he is   not answering questions appropriately. REVIEW OF SYSTEMS   Unable to obtain complete 12-system review, as the patient again is   not answering questions appropriately, confused. PHYSICAL EXAMINATION   VITAL SIGNS: Temperature is 98.5 degrees Fahrenheit, blood   pressure 124/63, heart rate 67, respiratory rate 14, saturation 98% on   room air. Recorded weight 203 pounds (92.1 kg), recorded height of 5   feet 8 inches tall. GENERAL: Elderly appearing, debilitated male in no acute respiratory   distress. PSYCHIATRIC: The patient was initially asleep, but aroused to loud   verbal stimuli, slowly orients x2 to person and place, otherwise   confused, occasionally disoriented and anxious. NEUROLOGIC: GCS of 13 (E4 V3 M6), spontaneous eye opening,   confused speech, and follows some commands with generalized   weakness without slurred speech, facial droop. Does not follow   commands well enough in order to test pronator drift with generalized   weakness. HEENT: Normocephalic, atraumatic. PERRLA. EOMs intact. Sclerae   are anicteric. Conjunctivae clear. Nares are patent. Oropharynx clear. Tongue is midline, nonedematous. NECK: Supple, without lymphadenopathy, JVD, carotid bruits,   thyromegaly. LYMPH: Negative for cervical without adenopathy. LUNGS: Lungs are clear to auscultation bilaterally. CARDIOVASCULAR: Heart is regular rhythm, normal S1, S2, without   murmurs, rubs or gallops. GI: Abdomen soft, nontender, nondistended with normoactive bowel   sounds. No rebound, guarding or rigidity. No auscultated abdominal   bruits. No pulsatile ass. BACK: No CVA tenderness. No step-off. MUSCULOSKELETAL: No acute palpable bony deformity. Limited   range of motion secondary to generalized weakness. Negative for calf   tenderness. VASCULAR: 2+ radial, 1+ dorsalis pedis pulses without cyanosis. Positive for clubbing. Nonpitting edema, bilateral lower extremities. SKIN: Warm and dry. LABORATORY DATA: I HAVE REVIEWED AS FOLLOWS: Sodium of   141, potassium 4.1, chloride 106, CO2 of 26, BUN 17, creatinine 1.29,   glucose 120, anion gap of 9, calcium 9.0, GFR greater than 60, total   bilirubin 0.3, total protein 7.3, albumin is 3.1, ALT of 21, AST of 20,   alkaline phosphatase of 144. Lactic acid 2.3. CK total 195, troponin I of   less than 0.04. Ammonia is 21. WBC 10.4, hemoglobin 12.1,   hematocrit 36.1, platelets are 734, neutrophils of 55%. Urinalysis:   Leukocyte esterase negative, nitrites negative, urobilinogen 1.0,   bilirubin negative, blood negative, ketones negative, glucose negative,   protein negative, pH of 5.5, specific gravity 1.014. WBC 0-4, RBC 0-5,   bacteria negative. CT of the head without contrast: Results reviewed   and noted in HPI. No acute intracranial abnormality. Chest x-ray,   portable, mild bibasilar opacities, likely represents atelectasis, no acute   process. A 12-lead EKG normal sinus rhythm at 66 beats per minute. IMPRESSION AND PLAN: A 68-year-old male with noted past medical   history, now admitted with altered mental status, lactic acidosis and   hypotension. 1. Altered mental status, uncertain etiology. Uncertain of his definite   baseline. Will continue on neurovascular checks, fall precautions. Consult with physical and occupational therapists. Consider for   Neurology consultation, check acetaminophen, salicylate levels, TSH. Order EEG. 2. Lactic acidosis. Continue with IV fluid hydration, repeat the lactic   acid levels until corrected. 3. Hypertension. Blood pressure is currently improved. Continue with   IV fluid resuscitation and monitor blood pressure closely. 4. Back pain, not currently reported. No reports of falls. 5. Atelectasis. Encourage in deep breathing. 6. Type 2 diabetes mellitus. Placed on Humalog insulin correctional   coverage, scheduled Accu-Cheks. Check hemoglobin A1c level. 7. Generalized weakness/debility. Plan as noted above. 8. History of seizures. We will check EEG, uncertain in regard to   current ____. ____, continue with his home medication regimen. 9. Venous thromboembolism prophylaxis. Sequential compression   devices, bilateral lower extremities. 10. Bradycardia. Continue telemetry. PARK Blackman MD      MP / CD   D:  02/04/2017   02:43   T:  02/04/2017   06:18   Job #:  [id number]

## 2017-02-04 NOTE — CONSULTS
Neurology:    I reviewed the medical record. 77-year-old gentleman who was found unresponsive without any clinical seizure activity noted. EEG has been ordered. I would also recommend obtaining an MRI brain noncontrast to rule out stroke. Other etiology could be polypharmacy. Careful to avoid oversedation. Continue telemetry for now. Avoid hypotension. Full note to follow.     [facility]  Neurology

## 2017-02-04 NOTE — ED TRIAGE NOTES
TRIAGE: Patient arrives by EMS from home where he EMS was summoned for patient being \"unresponsive\". Patient was reportedly eating 45 minutes ago (1955) and went \"unresponsive\". Hot dog was removed from patient's mouth by family and EMS providers.  for EMS. Following commands in triage. Answering appropriately. Appears sluggish. His complaint is back pain. History of seizures (On Depakote). Code S called by Charge RN prior to patient's arrival. Dr Peggy Phelps at bedside or immediate evaluation.

## 2017-02-04 NOTE — PROGRESS NOTES
Problem: Mobility Impaired (Adult and Pediatric)  Goal: *Acute Goals and Plan of Care (Insert Text)  Physical Therapy Goals  Initiated 2/4/2017  1. Patient will move from supine to sit and sit to supine in bed with modified independence within 5 day(s). 2. Patient will transfer from bed to chair and chair to bed with modified independence using the least restrictive device within 5 day(s). 3. Patient will perform sit to stand with modified independence within 5 day(s). 4. Patient will ambulate with supervision/set-up for 150 feet with the least restrictive device within 5 day(s). 5. Patient will ascend/descend 4 stairs with 2 handrail(s) with supervision/set-up within 5 day(s). PHYSICAL THERAPY EVALUATION  Patient: Madeleine Ayoub (45 y.o. male)  Date: 2/4/2017  Primary Diagnosis: Altered mental status        Precautions:   Fall      ASSESSMENT :  Based on the objective data described below, the patient presents with decreased strength and mobility. He was cleared by nursing to participate. Patient is a poor historian, therefore home status is not clear as he told therapist one thing, nursing coming in to take his blood sugar, heard what he had said and reported that he was told he lived with family (as he told therapist he lived alone). He did tell nursing that he primarily remains seated and goes from lazy boy to bathroom at home, but falls a lot. Rheta Hark not in room, therefore gait not assessed. But he did have difficulty with stepping to the head of bed. He was stepping in place and required cueing to actually move. His standing balance was fair to good as nursing had to change out his bedding and gown. Nursing also notes that he has not seen any family members around. He will require therapy to improve his bed mobility and continued assessment of ambulation. Patient will benefit from skilled intervention to address the above impairments.   Patients rehabilitation potential is considered to be Good  Factors which may influence rehabilitation potential include:   [ ]         None noted  [ ]         Mental ability/status  [X]         Medical condition  [X]         Home/family situation and support systems  [X]         Safety awareness  [ ]         Pain tolerance/management  [ ]         Other:        PLAN :  Recommendations and Planned Interventions:  [X]           Bed Mobility Training             [ ]    Neuromuscular Re-Education  [X]           Transfer Training                   [ ]    Orthotic/Prosthetic Training  [X]           Gait Training                         [ ]    Modalities  [X]           Therapeutic Exercises           [ ]    Edema Management/Control  [X]           Therapeutic Activities            [X]    Patient and Family Training/Education  [X]           Other (comment):     Frequency/Duration: Patient will be followed by physical therapy  6 times a week to address goals. Discharge Recommendations: Rehab vs  Home Health depending on his home situation. Further Equipment Recommendations for Discharge: TBD       SUBJECTIVE:   Patient stated hi.      OBJECTIVE DATA SUMMARY:   HISTORY:    Past Medical History   Diagnosis Date    [facility] [address]      Esophagitis         grade 4 - 2/2016    Gastrointestinal disorder         GERD    GIB (gastrointestinal bleeding)         2/2016 required 2 U PRBC    Hypertension      Psychiatric disorder         schizophrenia    Seizures ([facility] [address]      Stroke [facility])     History reviewed. No pertinent past surgical history. Prior Level of Function/Home Situation: unclear at this time. Patient is a poor historian.    Personal factors and/or comorbidities impacting plan of care:      Home Situation  Home Environment: Private residence  # Steps to Enter: 2  One/Two Story Residence: Two story  # of Interior Steps: 0  Height of Each Step (in): 0 inches  Interior Rails: Left  Lift Chair Available: No  Living Alone: Yes  Support Systems: Family member(s)  Patient Expects to be Discharged to[de-identified] Apartment  Current DME Used/Available at Home: Walker, rolling  Tub or Shower Type: Shower     EXAMINATION/PRESENTATION/DECISION MAKING:   Critical Behavior:  Neurologic State: Alert  Orientation Level: Oriented to situation  Cognition: Follows commands  Safety/Judgement: Awareness of environment     Strength:    Strength: Generally decreased, functional                    Tone & Sensation:   Tone: Normal              Sensation: Intact               Range Of Motion:  AROM: Generally decreased, functional                       Coordination:  Coordination: Generally decreased, functional     Functional Mobility:  Bed Mobility:  Rolling: Minimum assistance  Supine to Sit: Minimum assistance  Sit to Supine: Minimum assistance  Scooting: Stand-by asssistance  Transfers:  Sit to Stand: Minimum assistance  Stand to Sit: Stand-by asssistance                       Balance:   Sitting: Intact; With support  Sitting - Static: Good (unsupported)  Sitting - Dynamic: Fair (occasional)  Standing: With support  Standing - Static: Fair  Standing - Dynamic : Fair  Ambulation/Gait Training:  Distance (ft): 1 Feet (ft)  Assistive Device: Gait belt                    Base of Support: Narrowed                                                            Functional Measure:  Barthel Index:      Bathin  Bladder: 5  Bowels: 5  Groomin  Dressin  Feeding: 10  Mobility: 0  Stairs: 0  Toilet Use: 0  Transfer (Bed to Chair and Back): 0  Total: 30         Barthel and G-code impairment scale:  Percentage of impairment CH  0% CI  1-19% CJ  20-39% CK  40-59% CL  60-79% CM  80-99% CN  100%   Barthel Score 0-100 100 99-80 79-60 59-40 20-39 1-19    0   Barthel Score 0-20 20 17-19 13-16 9-12 5-8 1-4 0      The Barthel ADL Index: Guidelines  1. The index should be used as a record of what a patient does, not as a record of what a patient could do.   2. The main aim is to establish degree of independence from any help, physical or verbal, however minor and for whatever reason. 3. The need for supervision renders the patient not independent. 4. A patient's performance should be established using the best available evidence. Asking the patient, friends/relatives and nurses are the usual sources, but direct observation and common sense are also important. However direct testing is not needed. 5. Usually the patient's performance over the preceding 24-48 hours is important, but occasionally longer periods will be relevant. 6. Middle categories imply that the patient supplies over 50 per cent of the effort. 7. Use of aids to be independent is allowed. Domi Boo., Barthel, D.W. (4639). Functional evaluation: the Barthel Index. [facility] (14)2. Tamanna Covington car SILVESTRE Woods, Aretha Chacon., Trinity [place]., [place], [address] [place] [address] (1999). Measuring the change indisability after inpatient rehabilitation; comparison of the responsiveness of the Barthel Index and Functional Carbon Measure. Journal of Neurology, Neurosurgery, and Psychiatry, 66(4), 524-543. Kathy Vernon, N.J.A, BRIANA Casas, & Ronda Greene, M.A. (2004.) Assessment of post-stroke quality of life in cost-effectiveness studies: The usefulness of the Barthel Index and the EuroQoL-5D. Quality of Life Research, 13, 407-33         G codes: In compliance with CMSs Claims Based Outcome Reporting, the following G-code set was chosen for this patient based on their primary functional limitation being treated: The outcome measure chosen to determine the severity of the functional limitation was the Barthel with a score of 30/100 which was correlated with the impairment scale.       · Mobility - Walking and Moving Around:               - CURRENT STATUS:    CL - 60%-79% impaired, limited or restricted               - GOAL STATUS:           CJ - 20%-39% impaired, limited or restricted               - D/C STATUS: ---------------To be determined---------------      Physical Therapy Evaluation Charge Determination   History Examination Presentation Decision-Making   MEDIUM  Complexity : 1-2 comorbidities / personal factors will impact the outcome/ POC  MEDIUM Complexity : 3 Standardized tests and measures addressing body structure, function, activity limitation and / or participation in recreation  MEDIUM Complexity : Evolving with changing characteristics  MEDIUM Complexity : FOTO score of 26-74      Based on the above components, the patient evaluation is determined to be of the following complexity level: MEDIUM     Pain:  Pain Scale 1: Numeric (0 - 10)  Pain Intensity 1: 0              Activity Tolerance:   Fair     Please refer to the flowsheet for vital signs taken during this treatment. After treatment:   [ ]         Patient left in no apparent distress sitting up in chair  [X]         Patient left in no apparent distress in bed  [X]         Call bell left within reach  [X]         Nursing notified  [ ]         Caregiver present  [ ]         Bed alarm activated      COMMUNICATION/EDUCATION:   The patients plan of care was discussed with: Registered Nurse.  [X]         Fall prevention education was provided and the patient/caregiver indicated understanding. [X]         Patient/family have participated as able in goal setting and plan of care. [X]         Patient/family agree to work toward stated goals and plan of care. [ ]         Patient understands intent and goals of therapy, but is neutral about his/her participation. [ ]         Patient is unable to participate in goal setting and plan of care.      Thank you for this referral.  Tammy Allen, PT   Time Calculation: 25 mins

## 2017-02-04 NOTE — PROCEDURES
ELECTROENCEPHALOGRAM REPORT     Patient Name: Isaac Rios  : 1950  Age: 77 y.o. Ordering physician:  Sherman Retana  Date of EE2017    Interpreting physician: Camden Martinez DO      PROCEDURE: EEG. CLINICAL INDICATION: The patient is a 77 y.o. male who is being evaluated for baseline electro cerebral activities and to rule out seizure focus. FOUND UNRESPONSIVE      DESCRIPTION OF THE RECORD:   The background of this recording contains a posteriorly-located occipital rhythm of 7-8 Hz that attenuates with eye opening. There was a normal frequency-amplitude gradient. Throughout the recording, there were neither clear areas of focal slowing nor spike or spike-and-wave discharges seen. There is notable muscle artifact throughout. Candance Huger Hyperventilation was not performed. Photic stimulation produced a minimal driving response in the posterior head regions. During the recording the patient did not achieve stage II sleep    INTERPRETATION: This is a normal electroencephalogram with the patient   awake showing no clear focal abnormalities or epileptiform   activity. A normal EEG does not rule out seizures. Clinical correlation recommended.       [address] [place] [address],   Diplomate, American Board of Psychiatry & Neurology (Neurology)

## 2017-02-04 NOTE — ED NOTES
Assumed care of patient from [place]. Patient resting on stretcher, NAD noted at this time; Denies complaints. Bed low and locked, call bell in reach. Will continue to monitor.

## 2017-02-04 NOTE — PROGRESS NOTES
Problem: Self Care Deficits Care Plan (Adult)  Goal: *Acute Goals and Plan of Care (Insert Text)  Occupational Therapy Goals  Initiated 2/4/2017  1. Patient will perform gathering ADL items high and low 4/4 with supervision within 7 day. 2. Patient will perform standing ADLs/therapeutic activities 10 mins with supervision/set-up within 7 day(s). 3. Patient will perform simple home management (i.e. Making bed, putting away laundry, simple snack prep) if patient performs at baseline with moderate assistance within 7 day(s). 4. Patient will perform toilet transfers with supervision/set-up within 7 day(s). 5. Patient will perform all aspects of toileting with modified independence within 7 day(s). OCCUPATIONAL THERAPY EVALUATION  Patient: Benito Ordonez (62 y.o. male)  Date: 2/4/2017  Primary Diagnosis: Altered mental status        Precautions:   Seizure      ASSESSMENT :  Based on the objective data described below, the patient presents with setup to moderate A (not desiring to clean himself up after BM), bed mobility with moderate A, sit<>stand supervision with chair to lean on/as RW, steps up the bed with supervision, standing tolerance 5 mins. ADLs limited by R UE slight tremor and rigidity, R hand contracture in flexion, standing balance and tolerance, cognition (memory, processing, attention to task, verbal and motorically perseverates, executive function). Recommend patient to discharge to least restrictive environment with help with medication management, financial management and daily routine i.e. back to his home/familiar environment/routines/people to maximize his independence. Recommend with nursing patient to complete as able in order to maintain strength, endurance and independence: ADLs with supervision/setup, OOB to chair 3x/day and mobilizing to [facility] for toileting with 1 assist until PT clears him otherwise Thank you for your assistance.       Patient will benefit from skilled intervention to address the above impairments. Patients rehabilitation potential is considered to be Good  Factors which may influence rehabilitation potential include:   [X]             None noted  [ ]             Mental ability/status  [ ]             Medical condition  [ ]             Home/family situation and support systems  [ ]             Safety awareness  [ ]             Pain tolerance/management  [ ]             Other:        PLAN :  Recommendations and Planned Interventions:  [X]               Self Care Training                  [X]        Therapeutic Activities  [X]               Functional Mobility Training    [X]        Cognitive Retraining  [X]               Therapeutic Exercises           [X]        Endurance Activities  [X]               Balance Training                   [ ]        Neuromuscular Re-Education  [ ]               Visual/Perceptual Training     [X]   Home Safety Training  [X]               Patient Education                 [X]        Family Training/Education  [ ]               Other (comment):     Frequency/Duration: Patient will be followed by occupational therapy 3 times a week to address goals. Discharge Recommendations: Home Health, None and To Be Determined  Further Equipment Recommendations for Discharge: shower chair or tub bench pending home setup        SUBJECTIVE:   Patient stated Well I was sitting there eating, well I was sitting there eating, well I think I was sitting there eating a price.  (describing why he was brought to the hospital; he was sitting and eating when he was found slumped in chair). OBJECTIVE DATA SUMMARY:   HISTORY:   Past Medical History   Diagnosis Date    Diabetes ([facility] [address]      Esophagitis         grade 4 - 2/2016    Gastrointestinal disorder         GERD    GIB (gastrointestinal bleeding)         2/2016 required 2 U PRBC    Hypertension      Psychiatric disorder         schizophrenia    Seizures ([facility] [address]      Stroke [facility])     History reviewed.  No pertinent past surgical history. Prior Level of Function/Home Situation: Per chart total A ADLs, cane for mobility but PT has been trying to get him to use a RW. However, patient reporting he completes his own dressing and bathing but has fallen a lot so he only moves when someone is there with him, sits to bathe because he does not want to fall. Unclear if he still lives in group home or with his family. a  Expanded or extensive additional review of patient history:      Home Situation  Home Environment: Apartment  # Steps to Enter: 2  One/Two Story Residence: Two story  # of Interior Steps: 0  Height of Each Step (in): 0 inches  Interior Rails: Left  Lift Chair Available: No  Living Alone: Yes  Support Systems: Family member(s)  Patient Expects to be Discharged to[de-identified] Apartment  Current DME Used/Available at Home: Lavaun Mateusz or Shower Type: Shower  [ ]  Right hand dominant   [X]  Left hand dominant     EXAMINATION OF PERFORMANCE DEFICITS:  Cognitive/Behavioral Status:  Neurologic State: Alert  Orientation Level: Oriented to person;Oriented to place; Disoriented to time;Oriented to situation  Cognition: Follows commands; Impaired decision making  Perception: Appears intact  Perseveration: Perseverates during conversation;Perseverates during ADLS  Safety/Judgement: Decreased awareness of environment  Skin: intact L PIV  Edema: intact as seen  Vision/Perceptual:                           Acuity: Impaired near vision; Impaired far vision (states impaired, unable to elaborate or complete testing)       Range of Motion:     AROM: Generally decreased, functional (shoulder flexion 120*, elbows WDL, R hand poor extension) able to use thumb and index finger in pincer grasp during ADLs                       Strength:     Strength: Generally decreased, functional (-3/5 shoulders, -2/5 R hand)              Coordination:  Coordination: Generally decreased, functional (slight delay)  Fine Motor Skills-Upper: Right Impaired;Left Intact    Gross Motor Skills-Upper: Right Impaired;Left Intact  Tone & Sensation:     Tone: Abnormal (R hand flexor tone)  Sensation: Intact (per patient report)                       Balance:  Sitting: Intact; Without support  Standing: Impaired; Without support  Standing - Static: Fair (one hand on supportive surface)  Standing - Dynamic : Fair (one hand on supportive surface)     Functional Mobility and Transfers for ADLs:  Bed Mobility:  Rolling: Supervision  Supine to Sit: Moderate assistance (A tactile cues, physical A trunk)  Sit to Supine: Moderate assistance (A LEs)  Scooting: Supervision (to EOB, verbal cues to keep going)     Transfers:  Sit to Stand: Supervision  Stand to Sit: Supervision  Tub Transfer: Total assistance (not safe at this time)  Shower Transfer: Total assistance (not safe at this time)     ADL Assessment:  Feeding: Supervision (cues time to eat, setup foil containers PRN)     Oral Facial Hygiene/Grooming: Supervision sitting EOB     Bathing: Supervision sitting EOB, standing for erik-anal and washed to ankles again, one hand on stable surface     Upper Body Dressing: Supervision setup     Lower Body Dressing: Supervision setup     Toileting: moderate A for BM hygiene      Patient bathed with often setting washcloth on R hand and completing B hand bathing to body, draped cloth on R hand for bathing L side of body all while sitting. Patient demonstrated bathing erik-anal while standing however concerned about cleaning after BM hygiene \"I can't, I called and no one came. I can't, I called and I called. No one came\". Patient received supine and returned to supine for EEG.            ADL Intervention and task modifications:                                            Cognitive Retraining  Safety/Judgement: Decreased awareness of environment     Therapeutic Exercise:          Pain:  Pain Scale 1: Numeric (0 - 10)  Pain Intensity 1: 0              Activity Tolerance:   High BP but stable throughout 024//96 systolic  Please refer to the flowsheet for vital signs taken during this treatment. After treatment:   [ ] Patient left in no apparent distress sitting up in chair  [X] Patient left in no apparent distress in bed  [X] Call bell left within reach  [X] Nursing notified  [ ] Caregiver present  [ ] Bed alarm activated      COMMUNICATION/EDUCATION:   The patients plan of care was discussed with: Registered Nurse.  [X] Home safety education was provided and the patient/caregiver indicated understanding. [X] Patient/family have participated as able in goal setting and plan of care. [X] Patient/family agree to work toward stated goals and plan of care. [ ] Patient understands intent and goals of therapy, but is neutral about his/her participation. [ ] Patient is unable to participate in goal setting and plan of care. This patients plan of care is appropriate for delegation to [facility].      Thank you for this referral.  Fantasma Marshall  Time Calculation: 37 mins

## 2017-02-04 NOTE — ED PROVIDER NOTES
HPI Comments: 77 y.o. male with past medical history significant for stroke, HTN, seizures, GERD, schizophrenia, esophagitis, and GIB who presents from home via EMS with chief complaint of AMS. Per EMS, pt was eating with his family when he suddenly slumped over with food in his mouth. EMS states that the family was able to remove any food from the pt's mouth relatively quickly. EMS states that when they arrived pt was unable to respond to them and was only moaning. EMS notes that the pt did start to respond to them en route but notes that he is sluggish in his responses. Per EMS, pt was hypotensive (80/59) and bradycardic (54 bpm) en route to the ED. Per medical records, pt is disorientated at baseline. Per family, pt has h/o \"seizures without shaking\" and experienced similar symptoms that he had today on thanksgiving when he had a seizure. Per nurse, pt takes Depakote to manage his seizures. Pt c/o back pain in the ED. EMS denies the family having witnessed any seizure activites or the family witnessing similar episodes in the past.There are no other acute medical concerns at this time. Social hx: (+)smoker, (-)EtOH    PCP: Alonso Carranza MD    Full history, physical exam, and ROS unable to be obtained due to:  Mental status. Note written by Miriam Mark, as dictated by Iftikhar Hannah. Juany Rice MD 8:48 PM            The history is provided by the EMS personnel and medical records. Past Medical History:   Diagnosis Date    [facility] ([facility] [address]     Esophagitis      grade 4 - 2/2016    Gastrointestinal disorder      GERD    GIB (gastrointestinal bleeding)      2/2016 required 2 U PRBC    Hypertension     Psychiatric disorder      schizophrenia    Seizures ([facility] [address]     Stroke [facility])        History reviewed. No pertinent past surgical history. History reviewed. No pertinent family history.     Social History     Social History    Marital status: SINGLE     Spouse name: N/A    Number of children: N/A    Years of education: N/A     Occupational History    Not on file. Social History Main Topics    Smoking status: Current Every Day Smoker    Smokeless tobacco: Not on file    Alcohol use No      Comment: last drink 13 yrs ago    Drug use: No    Sexual activity: No     Other Topics Concern    Not on file     Social History Narrative         ALLERGIES: Thorazine [chlorpromazine]    Review of Systems   Unable to perform ROS: Mental status change       Vitals:    02/03/17 2043   BP: 102/68   Pulse: (!) 56   Resp: 18   Temp: 98.5 °F (36.9 °C)   SpO2: 100%   Weight: 92.1 kg (203 lb)   Height: 5' 8\" (1.727 m)            Physical Exam   Constitutional: He appears well-developed and well-nourished. No distress. Elderly somnolent    HENT:   Head: Normocephalic and atraumatic. Eyes: Pupils are equal, round, and reactive to light. No scleral icterus. Neck: Normal range of motion. Neck supple. No tracheal deviation present. Cardiovascular: Normal rate, regular rhythm and normal heart sounds. Exam reveals no gallop and no friction rub. No murmur heard. Pulmonary/Chest: Effort normal and breath sounds normal. No respiratory distress. He has no wheezes. He has no rales. Abdominal: Soft. He exhibits no distension. There is no tenderness. There is no rebound and no guarding. Musculoskeletal: He exhibits no edema. Strength equal and bilateral and normal coordination   Neurological: He is alert. Slow to respond   Skin: Skin is warm and dry. Psychiatric: He has a normal mood and affect. Nursing note and vitals reviewed. Note written by Errol Sosa. Oscar Notch, as dictated by Rosanne Noel. Sudeep Ramirez MD 8:48 PM       MDM  Number of Diagnoses or Management Options  Unresponsiveness:   Diagnosis management comments: 77year old male p/w unresponsive episode.  Diff dx includes arrhythmia, syncope, seizure, ICH    Head CT unremarkable  CXR shows bibasilar opacities  Labs remarkable for lactic acid 2.3  EKG: NSR at 66, normal int, norm axis, no ischemia    Impression: Syncope  Plan: Admit    Patient Progress  Patient progress: improved    ED Course       Procedures    CONSULT NOTE:  11:23 PM Saqib Guerra MD spoke with Dr. Sloane Duong, Consult for Hospitalist.  Discussed available diagnostic tests and clinical findings. He is in agreement with care plans as outlined. Dr. Sloane Duong will admit the pt.

## 2017-02-04 NOTE — ROUTINE PROCESS
TRANSFER - OUT REPORT:    Verbal report given to [address] [facility], RN on [employer]  being transferred to [facility] for routine progression of care       Report consisted of patients Situation, Background, Assessment and   Recommendations(SBAR). Information from the following report(s) SBAR, ED Summary, [facility] and Recent Results was reviewed with the receiving nurse. Lines:   Peripheral IV 02/03/17 Left Antecubital (Active)   Site Assessment Clean, dry, & intact 2/3/2017  8:45 PM   Phlebitis Assessment 0 2/3/2017  8:45 PM   Infiltration Assessment 0 2/3/2017  8:45 PM   Dressing Type Tape;Transparent 2/3/2017  8:45 PM   Hub Color/Line Status Pink;Capped;Flushed 2/3/2017  8:45 PM   Action Taken Blood drawn 2/3/2017  8:45 PM        Opportunity for questions and clarification was provided.       Patient transported with:   [place]

## 2017-02-05 LAB
GLUCOSE BLD STRIP.AUTO-MCNC: 142 MG/DL (ref 65–100)
GLUCOSE BLD STRIP.AUTO-MCNC: 163 MG/DL (ref 65–100)
GLUCOSE BLD STRIP.AUTO-MCNC: 222 MG/DL (ref 65–100)
GLUCOSE BLD STRIP.AUTO-MCNC: 61 MG/DL (ref 65–100)
GLUCOSE BLD STRIP.AUTO-MCNC: 71 MG/DL (ref 65–100)
GLUCOSE BLD STRIP.AUTO-MCNC: 75 MG/DL (ref 65–100)
SERVICE CMNT-IMP: ABNORMAL
SERVICE CMNT-IMP: NORMAL
SERVICE CMNT-IMP: NORMAL

## 2017-02-05 PROCEDURE — 82962 GLUCOSE BLOOD TEST: CPT

## 2017-02-05 PROCEDURE — 65660000000 HC RM CCU STEPDOWN

## 2017-02-05 PROCEDURE — 74011000258 HC RX REV CODE- 258: Performed by: INTERNAL MEDICINE

## 2017-02-05 PROCEDURE — 74011250637 HC RX REV CODE- 250/637: Performed by: INTERNAL MEDICINE

## 2017-02-05 PROCEDURE — 74011636637 HC RX REV CODE- 636/637: Performed by: FAMILY MEDICINE

## 2017-02-05 PROCEDURE — 74011000250 HC RX REV CODE- 250: Performed by: FAMILY MEDICINE

## 2017-02-05 RX ADMIN — QUETIAPINE FUMARATE 50 MG: 25 TABLET, FILM COATED ORAL at 08:14

## 2017-02-05 RX ADMIN — Medication 5 ML: at 14:00

## 2017-02-05 RX ADMIN — DEXTROSE MONOHYDRATE 12.5 G: 25 INJECTION, SOLUTION INTRAVENOUS at 16:50

## 2017-02-05 RX ADMIN — Medication 10 ML: at 22:42

## 2017-02-05 RX ADMIN — METOPROLOL TARTRATE 75 MG: 50 TABLET ORAL at 18:28

## 2017-02-05 RX ADMIN — OXCARBAZEPINE 150 MG: 150 TABLET, FILM COATED ORAL at 08:15

## 2017-02-05 RX ADMIN — SIMVASTATIN 20 MG: 20 TABLET, FILM COATED ORAL at 22:41

## 2017-02-05 RX ADMIN — AMLODIPINE BESYLATE 10 MG: 5 TABLET ORAL at 08:15

## 2017-02-05 RX ADMIN — OXCARBAZEPINE 150 MG: 150 TABLET, FILM COATED ORAL at 18:28

## 2017-02-05 RX ADMIN — LAMOTRIGINE 50 MG: 25 TABLET ORAL at 18:28

## 2017-02-05 RX ADMIN — LISINOPRIL 40 MG: 10 TABLET ORAL at 08:14

## 2017-02-05 RX ADMIN — SODIUM CHLORIDE 75 ML/HR: 450 INJECTION, SOLUTION INTRAVENOUS at 07:09

## 2017-02-05 RX ADMIN — SODIUM CHLORIDE 75 ML/HR: 450 INJECTION, SOLUTION INTRAVENOUS at 22:42

## 2017-02-05 RX ADMIN — DEXTROSE MONOHYDRATE 25 G: 25 INJECTION, SOLUTION INTRAVENOUS at 08:12

## 2017-02-05 RX ADMIN — DIVALPROEX SODIUM 1000 MG: 500 TABLET, FILM COATED, EXTENDED RELEASE ORAL at 08:15

## 2017-02-05 RX ADMIN — INSULIN LISPRO 3 UNITS: 100 INJECTION, SOLUTION INTRAVENOUS; SUBCUTANEOUS at 12:33

## 2017-02-05 RX ADMIN — METOPROLOL TARTRATE 75 MG: 50 TABLET ORAL at 08:13

## 2017-02-05 RX ADMIN — LAMOTRIGINE 50 MG: 25 TABLET ORAL at 08:14

## 2017-02-05 NOTE — PROGRESS NOTES
Problem: Falls - Risk of  Goal: *Absence of falls  Outcome: Progressing Towards Goal  yenifer scale complete, pt instructed to use call bell for assistance. Call bell and bedside table within reach.   Slip resistant foot wear applied, bed rails up x3    Problem: Pressure Ulcer - Risk of  Goal: *Prevention of pressure ulcer  Outcome: Progressing Towards Goal  [place] pt q2-3 hours while in bed, BG q AC/HS

## 2017-02-05 NOTE — CONSULTS
NEUROLOGY  2/5/2017     Consulted by: Nikole Young MD        Patient ID:  Margarita Ortega  [id number]  79 y.o.  1950    Chief Complaint   Patient presents with    Altered mental status       HPI    51-year-old gentleman admitted for being found unresponsive. He has several medical and PSY conditions chronically to include diabetes, hypertension, seizure disorder. The patient cannot give me any details. He tells me he feels his normal self now. EEG was done yesterday which did not show any discharges and was unremarkable in general.      Review of Systems   Unable to perform ROS: Mental acuity       Past Medical History   Diagnosis Date    Diabetes ([facility] [address]     Esophagitis      grade 4 - 2/2016    Gastrointestinal disorder      GERD    GIB (gastrointestinal bleeding)      2/2016 required 2 U PRBC    Hypertension     Psychiatric disorder      schizophrenia    Seizures ([facility] [address]     Stroke [facility])      History reviewed. No pertinent family history. Social History     Social History    Marital status: SINGLE     Spouse name: N/A    Number of children: N/A    Years of education: N/A     Occupational History    Not on file.      Social History Main Topics    Smoking status: Current Every Day Smoker    Smokeless tobacco: Not on file    Alcohol use No      Comment: last drink 13 yrs ago    Drug use: No    Sexual activity: No     Other Topics Concern    Not on file     Social History Narrative     Current Facility-Administered Medications   Medication Dose Route Frequency    sodium chloride (NS) flush 5-10 mL  5-10 mL IntraVENous Q8H    sodium chloride (NS) flush 5-10 mL  5-10 mL IntraVENous PRN    glucose chewable tablet 16 g  4 Tab Oral PRN    dextrose (D50W) injection syrg 12.5-25 g  12.5-25 g IntraVENous PRN    glucagon (GLUCAGEN) injection 1 mg  1 mg IntraMUSCular PRN    insulin lispro (HUMALOG) injection   SubCUTAneous AC&HS    lamoTRIgine (LaMICtal) tablet 50 mg  50 mg Oral BID    OXcarbazepine (TRILEPTAL) tablet 150 mg  150 mg Oral BID    QUEtiapine (SEROquel) tablet 50 mg  50 mg Oral DAILY    divalproex ER (DEPAKOTE ER) 24 hour tablet 1,000 mg  1,000 mg Oral DAILY    amLODIPine (NORVASC) tablet 10 mg  10 mg Oral DAILY    simvastatin (ZOCOR) tablet 20 mg  20 mg Oral QHS    lisinopril (PRINIVIL, ZESTRIL) tablet 40 mg  40 mg Oral DAILY    metoprolol tartrate (LOPRESSOR) tablet 75 mg  75 mg Oral BID    0.45% sodium chloride infusion  75 mL/hr IntraVENous CONTINUOUS     Allergies   Allergen Reactions    Thorazine [Chlorpromazine] Other (comments)     \"burns head and throat\"       Visit Vitals    /67 (BP 1 Location: Right arm, BP Patient Position: At rest)    Pulse 72    Temp 98.7 °F (37.1 °C)    Resp 14    Ht 5' 8\" (1.727 m)    Wt 88.9 kg (195 lb 15.8 oz)    SpO2 99%    BMI 29.8 kg/m2     Physical Exam   Constitutional: He appears well-developed and well-nourished. Cardiovascular: Normal rate. Pulmonary/Chest: Effort normal.   Skin: Skin is warm and dry. Psychiatric: His speech is normal.   Vitals reviewed. Neurologic Exam     Mental Status   Oriented to place. Disoriented to time. Attention: decreased. Speech: speech is normal   Level of consciousness: drowsy ,  arousable by verbal stimuli    Cranial Nerves   Cranial nerves II through XII intact.      CN VII   Right facial weakness: central    Motor Exam   Muscle bulk: normal  Overall muscle tone: normal       RLE 4+     Sensory Exam   Light touch normal.     Gait, Coordination, and Reflexes     Gait  Gait: (def - weakenss)    Tremor   Resting tremor: absent           Lab Results  Component Value Date/Time   WBC 9.7 02/04/2017 02:33 AM   Hemoglobin (POC) 11.9 09/20/2009 09:21 AM   HGB 12.4 02/04/2017 02:33 AM   Hematocrit (POC) 35 09/20/2009 09:21 AM   HCT 37.6 02/04/2017 02:33 AM   PLATELET 561 42/24/9900 02:33 AM   MCV 94.0 02/04/2017 02:33 AM       Lab Results  Component Value Date/Time   Hemoglobin A1c 5.8 02/04/2017 06:48 AM   Hemoglobin A1c 11.0 01/29/2016 08:28 PM   Hemoglobin A1c 6.2 05/09/2014 03:43 AM   Glucose 144 02/04/2017 02:33 AM   Glucose (POC) 222 02/05/2017 12:00 PM   Glucose (POC) 84 09/20/2009 09:21 AM   LDL, calculated 50.8 02/04/2017 02:33 AM   Creatinine (POC) 3.1 09/20/2009 09:21 AM   Creatinine 1.13 02/04/2017 02:33 AM      Lab Results  Component Value Date/Time   Cholesterol, total 109 02/04/2017 02:33 AM   HDL Cholesterol 39 02/04/2017 02:33 AM   LDL, calculated 50.8 02/04/2017 02:33 AM   Triglyceride 96 02/04/2017 02:33 AM   CHOL/HDL Ratio 2.8 02/04/2017 02:33 AM       Lab Results  Component Value Date/Time   ALT (SGPT) 21 02/03/2017 08:51 PM   AST (SGOT) 20 02/03/2017 08:51 PM   Alk. phosphatase 144 02/03/2017 08:51 PM   Bilirubin, total 0.3 02/03/2017 08:51 PM       Lab Results  Component Value Date/Time   TSH 1.18 02/04/2017 02:33 AM   T4, Free 1.2 02/04/2017 02:33 AM         CT Results (maximum last 3): Results from [place] encounter on 02/03/17   CT HEAD WO CONT   Narrative EXAM:  CT head without contrast    INDICATION: Altered mental status. COMPARISON: CT head 4/8/2016    TECHNIQUE: Axial noncontrast head CT from foramen magnum to vertex. Coronal and  sagittal reformatted images were obtained. CT dose reduction was achieved  through use of a standardized protocol tailored for this examination and  automatic exposure control for dose modulation. Adaptive statistical iterative  reconstruction (ASIR) was utilized. FINDINGS:  There is diffuse age-related parenchymal volume loss. The ventricles  and sulci are age-appropriate without hydrocephalus. There is no mass effect or  midline shift. There is no intracranial hemorrhage or extra-axial fluid  collection. Scattered foci of low attenuation in the periventricular white  matter represent stable chronic microvascular ischemic changes.  There is stable  chronic lacunar infarcts in the basal ganglia and stable chronic bilateral  cerebellar infarcts. There is no new abnormal parenchymal attenuation. The basal  cisterns are patent. There is intracranial atherosclerosis. The osseous structures are intact. The visualized paranasal sinuses and mastoid  air cells are clear. Impression IMPRESSION:   There is no acute intracranial abnormality. Results from [place] encounter on 04/08/16   CT HEAD WO CONT   Narrative **Final Report**      ICD Codes / Adm. Diagnosis: 487801  584.9 / Lethargy  Lethargic  Examination:  CT HEAD WO CON  - [id number] - Apr 8 2016  8:05AM  Accession No:  [id number]  Reason:  Pain      REPORT:  INDICATION: Lethargy, pain. Exam: Noncontrast CT of the brain is performed with 5 mm collimation. FINDINGS: There is stable diffuse cortical atrophy. There is no acute   intracranial hemorrhage, mass, mass effect or herniation. Ventricular system   is normal. There is no evidence of acute territorial infarct. There are   stable periventricular white matter hypodensities consistent with chronic   microvascular ischemic changes. The mastoid air cells are well pneumatized. The visualized paranasal sinuses are normal.       IMPRESSION: No acute intracranial hemorrhage, mass or infarct. Signing/Reading Doctor: LUIS ANTONIO Heredia ([id number])    Approved: LUIS ANTONIO Heredia ([id number])  Apr 8 2016  7:34AM                                        Assessment and Plan        79year-old gentleman with multiple chronic conditions and on various medications who had a period of unresponsiveness. No clinical seizure activity. EEG was normal yesterday. He is a poor historian and cannot really give me any details. On exam he is not oriented to time and there is some questionable right leg weakness. Because of the focal findings and stroke RF, MRI brain noncon indicated. Need to r/o acute stroke. Polypharmacy also on DDx for his presentations. Avoid oversedation. Will f/u once MRI done. Hari Noonan,   NEUROLOGIST  Diplomate ABPN  2/5/2017

## 2017-02-05 NOTE — PROGRESS NOTES
Hospitalist Progress Note         Leonard Shepherd MD     Call physician on-call through the  7pm-7am    Daily Progress Note: 2017    Admission summary and hospital course  75-year-old [facility], [facility] [place]   male with past medical history of type 2 diabetes mellitus, esophagitis,   GERD, GI bleed, anemia, chronic pain, mood disorder, COPD,   hypertension, tobacco abuse, schizophrenia, seizures, stroke,   presented to the emergency department via EMS from home with   reported altered mental status. Assessment/Plan:        · AMS  :  EEG negative for seizures , not sure if baseline , family is not traceable . MRI is pending , neuro consult . Urine is clean . No infection suspected . CT head is normal .   · HTN : well controlled now on med s  · DM type 2 : continue home meds and accuchecks , lantus on hold due to hypoglycemia   · History of seizures : on meds , none in hospital . depakoate and lamictal   · COPD , tobacco use   · Stroke in past .    · Hyperlipidemia  : zocor   · History of GI bleed , asa on hold pending MRI , but can resume on discharge      VTE prophylaxis: SCD/lovenox  Discussed plan of care with Patient/Family and Nurse   Pre-admission lived at   Discharge planning:PT/OT          Subjective: The patient is without any acute complaints at this time. Not very verbal today     Review of Systems:   A comprehensive review of systems was negative except mentioned in A/P    Objective:   Physical Exam:     Visit Vitals    /64 (BP 1 Location: Left arm)    Pulse 62    Temp 98.9 °F (37.2 °C)    Resp 22    Ht 5' 8\" (1.727 m)    Wt 88.9 kg (195 lb 15.8 oz)    SpO2 99%    BMI 29.8 kg/m2      O2 Device: Room air    Temp (24hrs), Av.4 °F (36.9 °C), Min:97.7 °F (36.5 °C), Max:99 °F (37.2 °C)    701 - 1900  In: -   Out: 425 [Urine:425]   1901 - 700  In: 6724 [P.O.:240;  I.V.:1025]  Out: 2526 [Urine:2526]    General:  Alert, cooperative, no distress, appears stated age. Lungs:   Clear to auscultation bilaterally. Chest wall:  No tenderness or deformity. Heart:  Regular rate and rhythm, S1, S2 normal, no murmur, click, rub or gallop. Abdomen:   Soft, non-tender. Bowel sounds normal. No masses,  No organomegaly. Extremities: Extremities normal, atraumatic, no cyanosis or edema. Pulses: 2+ and symmetric all extremities. Skin: Skin color, texture, turgor normal. No rashes or lesions   Neurologic: CNII-XII intact. Data Review:       Recent Days:  Recent Labs      02/04/17 0233 02/03/17 2051   WBC  9.7  10.4   HGB  12.4  12.1   HCT  37.6  36.1*   PLT  162  151     Recent Labs      02/04/17 0233 02/03/17 2051   NA  146*  141   K  4.0  4.1   CL  109*  106   CO2  28  26   GLU  144*  128*   BUN  18  17   CREA  1.13  1.29   CA  8.9  9.0   MG  1.7   --    PHOS  3.1   --    ALB   --   3.1*   SGOT   --   20   ALT   --   21     No results for input(s): PH, PCO2, PO2, HCO3, FIO2 in the last 72 hours.     24 Hour Results:  Recent Results (from the past 24 hour(s))   GLUCOSE, POC    Collection Time: 02/04/17  5:17 PM   Result Value Ref Range    Glucose (POC) 135 (H) 65 - 100 mg/dL    Performed by Lowell Vora, POC    Collection Time: 02/04/17  9:36 PM   Result Value Ref Range    Glucose (POC) 103 (H) 65 - 100 mg/dL    Performed by Bandar Lugo    GLUCOSE, POC    Collection Time: 02/05/17  7:40 AM   Result Value Ref Range    Glucose (POC) 75 65 - 100 mg/dL    Performed by Mike Isael    GLUCOSE, POC    Collection Time: 02/05/17  7:55 AM   Result Value Ref Range    Glucose (POC) 71 65 - 100 mg/dL    Performed by Mike Isael    GLUCOSE, POC    Collection Time: 02/05/17 12:00 PM   Result Value Ref Range    Glucose (POC) 222 (H) 65 - 100 mg/dL    Performed by Olu Morrell    GLUCOSE, POC    Collection Time: 02/05/17  4:45 PM   Result Value Ref Range    Glucose (POC) 61 (L) 65 - 100 mg/dL    Performed by Mylene Vieira        Problem List:  Problem List as of 2/5/2017  Date Reviewed: 2/6/2016          Codes Class Noted - Resolved    Altered mental status ICD-10-CM: R41.82  ICD-9-CM: 780.97  2/4/2017 - Present        GI bleed ICD-10-CM: K92.2  ICD-9-CM: 578.9  2/6/2016 - Present        Seizure ([facility] ICD-10-CM: R56.9  ICD-9-CM: 780.39  5/8/2014 - Present        Hypoglycemia, unspecified ICD-10-CM: E16.2  ICD-9-CM: 251.2  9/24/2013 - Present        Paranoid schizophrenia ([facility] ICD-10-CM: F20.0  ICD-9-CM: 295.30  3/11/2012 - Present        HTN (hypertension) ICD-10-CM: I10  ICD-9-CM: 401.9  3/11/2012 - Present        HLD (hyperlipidemia) ICD-10-CM: E78.5  ICD-9-CM: 272.4  3/11/2012 - Present        Anemia, unspecified ICD-10-CM: D64.9  ICD-9-CM: 285.9  3/11/2012 - Present        Impaction of intestine ([facility] ICD-10-CM: K56.49  ICD-9-CM: 560.30  3/11/2012 - Present        Diabetes mellitus ([facility] ICD-10-CM: E11.9  ICD-9-CM: 250.00  3/11/2012 - Present        RESOLVED: Hyperglycemia ICD-10-CM: R73.9  ICD-9-CM: 790.29  1/29/2016 - 2/2/2016        RESOLVED: LAMAR (acute kidney injury) ([facility] ICD-10-CM: N17.9  ICD-9-CM: 584.9  1/29/2016 - 2/2/2016        RESOLVED: Dehydration ICD-10-CM: E86.0  ICD-9-CM: 276.51  3/11/2012 - 3/13/2012        RESOLVED: GI bleed ICD-10-CM: K92.2  ICD-9-CM: 578.9  1/4/2012 - 1/7/2012              Medications reviewed  Current Facility-Administered Medications   Medication Dose Route Frequency    sodium chloride (NS) flush 5-10 mL  5-10 mL IntraVENous Q8H    sodium chloride (NS) flush 5-10 mL  5-10 mL IntraVENous PRN    glucose chewable tablet 16 g  4 Tab Oral PRN    dextrose (D50W) injection syrg 12.5-25 g  12.5-25 g IntraVENous PRN    glucagon (GLUCAGEN) injection 1 mg  1 mg IntraMUSCular PRN    insulin lispro (HUMALOG) injection   SubCUTAneous AC&HS    lamoTRIgine (LaMICtal) tablet 50 mg  50 mg Oral BID    OXcarbazepine (TRILEPTAL) tablet 150 mg  150 mg Oral BID    QUEtiapine (SEROquel) tablet 50 mg  50 mg Oral DAILY    divalproex ER (DEPAKOTE ER) 24 hour tablet 1,000 mg  1,000 mg Oral DAILY    amLODIPine (NORVASC) tablet 10 mg  10 mg Oral DAILY    simvastatin (ZOCOR) tablet 20 mg  20 mg Oral QHS    lisinopril (PRINIVIL, ZESTRIL) tablet 40 mg  40 mg Oral DAILY    metoprolol tartrate (LOPRESSOR) tablet 75 mg  75 mg Oral BID    0.45% sodium chloride infusion  75 mL/hr IntraVENous CONTINUOUS       Care Plan discussed with: Nurse    Total time spent with patient: 30 minutes.     Christiana Franklin MD

## 2017-02-05 NOTE — PROGRESS NOTES
0734:Bedside and Verbal shift change report given to Jaqueline Doss (oncoming nurse) by Emily Marley RN (offgoing nurse). Report included the following information SBAR, Kardex, Intake/Output, MAR, Accordion, Med Rec Status and Cardiac Rhythm sinus ayaan.

## 2017-02-06 ENCOUNTER — APPOINTMENT (OUTPATIENT)
Dept: MRI IMAGING | Age: 67
DRG: 861 | End: 2017-02-06
Attending: INTERNAL MEDICINE
Payer: MEDICAID

## 2017-02-06 LAB
GLUCOSE BLD STRIP.AUTO-MCNC: 104 MG/DL (ref 65–100)
GLUCOSE BLD STRIP.AUTO-MCNC: 121 MG/DL (ref 65–100)
GLUCOSE BLD STRIP.AUTO-MCNC: 171 MG/DL (ref 65–100)
GLUCOSE BLD STRIP.AUTO-MCNC: 213 MG/DL (ref 65–100)
SERVICE CMNT-IMP: ABNORMAL

## 2017-02-06 PROCEDURE — 74011636637 HC RX REV CODE- 636/637: Performed by: FAMILY MEDICINE

## 2017-02-06 PROCEDURE — 74011250637 HC RX REV CODE- 250/637: Performed by: INTERNAL MEDICINE

## 2017-02-06 PROCEDURE — 74011250637 HC RX REV CODE- 250/637: Performed by: HOSPITALIST

## 2017-02-06 PROCEDURE — 97116 GAIT TRAINING THERAPY: CPT

## 2017-02-06 PROCEDURE — 82962 GLUCOSE BLOOD TEST: CPT

## 2017-02-06 PROCEDURE — 70551 MRI BRAIN STEM W/O DYE: CPT

## 2017-02-06 PROCEDURE — 65660000000 HC RM CCU STEPDOWN

## 2017-02-06 RX ORDER — PANTOPRAZOLE SODIUM 40 MG/1
40 TABLET, DELAYED RELEASE ORAL 2 TIMES DAILY
COMMUNITY

## 2017-02-06 RX ORDER — QUETIAPINE FUMARATE 25 MG/1
50 TABLET, FILM COATED ORAL
Status: DISCONTINUED | OUTPATIENT
Start: 2017-02-06 | End: 2017-02-08 | Stop reason: HOSPADM

## 2017-02-06 RX ORDER — ACETAMINOPHEN 325 MG/1
650 TABLET ORAL
Status: DISCONTINUED | OUTPATIENT
Start: 2017-02-06 | End: 2017-02-08 | Stop reason: HOSPADM

## 2017-02-06 RX ADMIN — LAMOTRIGINE 50 MG: 25 TABLET ORAL at 09:03

## 2017-02-06 RX ADMIN — AMLODIPINE BESYLATE 10 MG: 5 TABLET ORAL at 09:02

## 2017-02-06 RX ADMIN — Medication 10 ML: at 06:42

## 2017-02-06 RX ADMIN — INSULIN LISPRO 3 UNITS: 100 INJECTION, SOLUTION INTRAVENOUS; SUBCUTANEOUS at 13:37

## 2017-02-06 RX ADMIN — OXCARBAZEPINE 150 MG: 150 TABLET, FILM COATED ORAL at 09:03

## 2017-02-06 RX ADMIN — METOPROLOL TARTRATE 75 MG: 50 TABLET ORAL at 18:39

## 2017-02-06 RX ADMIN — LAMOTRIGINE 50 MG: 25 TABLET ORAL at 18:39

## 2017-02-06 RX ADMIN — Medication 10 ML: at 13:37

## 2017-02-06 RX ADMIN — QUETIAPINE FUMARATE 50 MG: 25 TABLET, FILM COATED ORAL at 22:20

## 2017-02-06 RX ADMIN — LISINOPRIL 40 MG: 10 TABLET ORAL at 09:02

## 2017-02-06 RX ADMIN — INSULIN LISPRO 2 UNITS: 100 INJECTION, SOLUTION INTRAVENOUS; SUBCUTANEOUS at 18:50

## 2017-02-06 RX ADMIN — ACETAMINOPHEN 650 MG: 325 TABLET, FILM COATED ORAL at 03:18

## 2017-02-06 RX ADMIN — METOPROLOL TARTRATE 75 MG: 50 TABLET ORAL at 09:03

## 2017-02-06 RX ADMIN — DIVALPROEX SODIUM 1000 MG: 500 TABLET, FILM COATED, EXTENDED RELEASE ORAL at 09:02

## 2017-02-06 RX ADMIN — Medication 10 ML: at 22:21

## 2017-02-06 RX ADMIN — OXCARBAZEPINE 150 MG: 150 TABLET, FILM COATED ORAL at 18:39

## 2017-02-06 RX ADMIN — SIMVASTATIN 20 MG: 20 TABLET, FILM COATED ORAL at 22:20

## 2017-02-06 NOTE — CDMP QUERY
Dear Dr. Jose Almeida,    Please clarify if this patient is being treated/managed for:    => Seizure (POA) in the setting of AMS requiring monitoring, Neurology consult and diagnostic testing  =>Other Explanation of clinical findings  =>Unable to Determine (no explanation of clinical findings)    The medical record reflects the following clinical findings, treatment, and risk factors:    Risk Factors: 68yo adm with AMS/unresponsiveness  Clinical Indicators: hx seizures, noted general weakness, A&O x2 per PN  Treatment: monitoring, CT head, EEG, Neurology consult    Please clarify and document your clinical opinion in the progress notes and discharge summary including the definitive and/or presumptive diagnosis, (suspected or probable), related to the above clinical findings. Please include clinical findings supporting your diagnosis.     Thank You  Farida Hidalgo,MSN,BSN,RN,[facility]  [phone]

## 2017-02-06 NOTE — PROGRESS NOTES
Bedside shift change report given to Sandra Velez RN (oncoming nurse) by Amando Swenson RN (offgoing nurse). Report included the following information SBAR, Kardex, ED Summary, Procedure Summary, Intake/Output, MAR, Accordion, Recent Results and Med Rec Status.

## 2017-02-06 NOTE — PROGRESS NOTES
Consult received for SLP dysphagia screening.  nsg dysphagia screen completed and WNL. If formal SLP evaluation is desired, please re-consult with SLP eval and treat order as SLP does not complete \"screenings\" only evaluations or treatments. Thanks. Sammie Silvestre M.CD.  CCC-SLP

## 2017-02-06 NOTE — PROGRESS NOTES
Problem: Mobility Impaired (Adult and Pediatric)  Goal: *Acute Goals and Plan of Care (Insert Text)  Physical Therapy Goals  Initiated 2/4/2017  1. Patient will move from supine to sit and sit to supine in bed with modified independence within 5 day(s). 2. Patient will transfer from bed to chair and chair to bed with modified independence using the least restrictive device within 5 day(s). 3. Patient will perform sit to stand with modified independence within 5 day(s). 4. Patient will ambulate with supervision/set-up for 150 feet with the least restrictive device within 5 day(s). 5. Patient will ascend/descend 4 stairs with 2 handrail(s) with supervision/set-up within 5 day(s). PHYSICAL THERAPY TREATMENT  Patient: Misty Romeo (55 y.o. male)  Date: 2/6/2017  Diagnosis: Altered mental status <principal problem not specified>       Precautions: Fall      ASSESSMENT:  Patient is making slow but steady progress. Received patient supine in bed and agreeable to PT. Unable to attain any more information on patient's living situation as patient was nonsensical with verbalization throughout session. Supine to sit EOB supervision and UE to pull up on handrails with verbal cues to initiate LE movement. Transfers Min A x 1 with rolling walker and verbal cues on proper hand placement. Initial standing balance fair as patient had trouble with forward weight shift to attain upright posture. Ambulated 20 ft with rolling walker and Min A x 1 for walker management as patient had difficulty with progressing walker forward and turning. Demonstrates a shuffled gait pattern with decreased step length B. Required constant cueing during ambulation to take bigger steps and managing walker. Returned to bedside chair and required Min A x 1 to control descent. Instructed in seated LE exercises. Left patient with all needs in reach and instructed to let nursing know when ready to return to bed.  At this time, recommend d/c to rehab vs SNF pending progress. Will continue to follow for mobility progression. Progression toward goals:  [ ]    Improving appropriately and progressing toward goals  [X]    Improving slowly and progressing toward goals  [ ]    Not making progress toward goals and plan of care will be adjusted       PLAN:  Patient continues to benefit from skilled intervention to address the above impairments. Continue treatment per established plan of care. Discharge Recommendations:  Rehab vs Skilled Nursing Facility  Further Equipment Recommendations for Discharge:  TBD       SUBJECTIVE:   Patient stated I am feeling a little bit better today.       OBJECTIVE DATA SUMMARY:   Critical Behavior:  Neurologic State: Alert  Orientation Level: Oriented to person, Oriented to place, Disoriented to situation, Disoriented to time  Cognition: Appropriate decision making, Appropriate for age attention/concentration, Appropriate safety awareness, Follows commands  Safety/Judgement: Awareness of environment  Functional Mobility Training:  Bed Mobility:  Rolling: Supervision  Supine to Sit: Supervision; Additional time     Scooting: Supervision        Transfers:  Sit to Stand: Minimum assistance;Assist x1;Adaptive equipment (rolling walker)  Stand to Sit: Minimum assistance;Assist x1 (assist to control decent)           Balance:  Sitting: Intact; With support  Sitting - Static: Good (unsupported)  Sitting - Dynamic: Fair (occasional)  Standing: Impaired; With support  Standing - Static: Fair  Standing - Dynamic : Poor  Ambulation/Gait Training:  Distance (ft): 20 Feet (ft)  Assistive Device: Gait belt;Walker, rolling  Ambulation - Level of Assistance: Minimal assistance;Assist x1;Additional time; Adaptive equipment (assist for walker management; constant verbal cues)        Gait Abnormalities: Decreased step clearance; Path deviations; Shuffling gait        Base of Support: Narrowed     Speed/Sulma: Shuffled; Slow  Step Length: Left shortened;Right shortened        Therapeutic Exercises:   Instructed in seated LE exercises:  LAQ x 10 B  Marches x 5 B  Adduction squeezes x 10  Pain:  Pain Scale 1: Numeric (0 - 10)  Pain Intensity 1: 0              Activity Tolerance:   VSS  Please refer to the flowsheet for vital signs taken during this treatment. After treatment:   [X]    Patient left in no apparent distress sitting up in chair  [ ]    Patient left in no apparent distress in bed  [X]    Call bell left within reach  [X]    Nursing notified  [ ]    Caregiver present  [X]    Chair alarm activated      COMMUNICATION/COLLABORATION:   The patients plan of care was discussed with: Physical Therapist and Registered Nurse     Regarding student involvement in patient care:  A student participated in this treatment session. Per CMS Medicare statements and APTA guidelines I certify that the following was true:  1. I was present and directly observed the entire session. 2. I made all skilled judgments and clinical decisions regarding care. 3. I am the practitioner responsible for assessment, treatment, and documentation. Mariajose Weaver, DELFINA   Time Calculation: 22 mins

## 2017-02-06 NOTE — DIABETES MGMT
DTC Progress Note    Recommendations/ Comments: Chart review for variable BG's including hypoyglcemia on 2/4/17 at 1235 BG 76 and   yesterday 2/5/17 hypo occurred at 0740 - 71 and 1645 BG 61. FBG today 104, pre lunch 213. Noted lispro normal correction scale ordered. If appropriate, please consider  Please document po intake  DTC will continue to observe and make recommendations as needed    Chart reviewed on [employer]. Patient is a 79 y.o. male with known DM on ALntsu 20 units daily and Metformin 500 mg daily at home. A1c:   Lab Results   Component Value Date/Time    Hemoglobin A1c 5.8 02/04/2017 06:48 AM    Hemoglobin A1c 11.0 01/29/2016 08:28 PM       Recent Glucose Results:   Lab Results   Component Value Date/Time    GLUCPOC 213 (H) 02/06/2017 12:10 PM    GLUCPOC 104 (H) 02/06/2017 08:27 AM    GLUCPOC 163 (H) 02/05/2017 09:30 PM        Lab Results   Component Value Date/Time    Creatinine 1.13 02/04/2017 02:33 AM       Active Orders   Diet    DIET DIABETIC CONSISTENT CARB Regular        PO intake: No data found. Current hospital DM medication: lispro normal correction scale    Will continue to follow as needed.     Thank you  Mara Ybarra RN, CDE

## 2017-02-06 NOTE — PROGRESS NOTES
Admission Medication Reconciliation:    Information obtained from: Caregiver - [phone]; [facility]    Significant PMH/Disease States:   Past Medical History   Diagnosis Date    Diabetes ([facility]     Esophagitis      grade 4 - 2016    Gastrointestinal disorder      GERD    GIB (gastrointestinal bleeding)      2016 required 2 U PRBC    Hypertension     Psychiatric disorder      schizophrenia    Seizures ([facility]     Stroke [facility])        Chief Complaint for this Admission:    Chief Complaint   Patient presents with    Altered mental status       Allergies:  Thorazine [chlorpromazine]    Prior to Admission Medications:   Prior to Admission Medications   Prescriptions Last Dose Informant Patient Reported? Taking? OXcarbazepine (TRILEPTAL) 150 mg tablet   Yes Yes   Sig: Take 150 mg by mouth two (2) times a day. QUEtiapine (SEROQUEL) 100 mg tablet   Yes Yes   Sig: Take 50 mg by mouth daily. QUEtiapine (SEROQUEL) 100 mg tablet   Yes Yes   Sig: Take 200 mg by mouth nightly. amLODIPine (NORVASC) 10 mg tablet   Yes Yes   Sig: Take 10 mg by mouth daily. benztropine (COGENTIN) 1 mg tablet   Yes Yes   Sig: Take 1 mg by mouth every twelve (12) hours. bisacodyl (DULCOLAX) 10 mg suppository   No Yes   Sig: Insert 10 mg into rectum daily as needed for Other (constipation). divalproex ER (DEPAKOTE ER) 500 mg ER tablet   No Yes   Sig: Take 2 Tabs by mouth daily. docusate sodium (COLACE) 100 mg capsule   Yes Yes   Sig: Take 100 mg by mouth daily. ferrous sulfate 325 mg (65 mg iron) tablet   Yes Yes   Sig: Take 325 mg by mouth two (2) times a day.   haloperidol (HALDOL) 5 mg tablet   Yes Yes   Sig: Take 5 mg by mouth every twelve (12) hours. insulin glargine (LANTUS) 100 unit/mL injection   No Yes   Si Units by SubCUTAneous route nightly. Indications: TYPE 2 DIABETES MELLITUS   lamoTRIgine (LAMICTAL) 25 mg tablet   Yes Yes   Sig: Take 50 mg by mouth two (2) times a day.    lisinopril (PRINIVIL, ZESTRIL) 40 mg tablet   Yes Yes   Sig: Take 40 mg by mouth daily. metFORMIN (GLUCOPHAGE) 500 mg tablet   Yes Yes   Sig: Take 500 mg by mouth daily (with breakfast). metoprolol (LOPRESSOR) 50 mg tablet   Yes Yes   Sig: Take 75 mg by mouth two (2) times a day. multivitamin (ONE A DAY) tablet   Yes Yes   Sig: Take 1 Tab by mouth daily. oxybutynin chloride XL (DITROPAN XL) 10 mg CR tablet   Yes Yes   Sig: Take 10 mg by mouth daily. pantoprazole (PROTONIX) 40 mg tablet   Yes Yes   Sig: Take 40 mg by mouth two (2) times a day. simvastatin (ZOCOR) 20 mg tablet   Yes Yes   Sig: Take 20 mg by mouth nightly. tamsulosin (FLOMAX) 0.4 mg capsule   Yes Yes   Sig: Take 0.4 mg by mouth daily. Facility-Administered Medications: None         Comments/Recommendations: Reviewed PTA meds over the phone with caregiver and confirmed with pharmacy. Added pantoprazole to list. Caregiver did report differences between prescribed doses and those that she reported. It seems for meds when 1/2 tab or 1 1/2 tabs are given there may be some confusion over dose.    -lamotrigine 25 mg BID (prescribed 50 mg BID)  -metoprolol 50 mg BID (prescribed 75 mg BID)  -quetiapine 100 mg BID (prescribed 50 mg QAM and 200 mg QPM)    I left the prescribed doses as listed above.     Moe Boudreaux, PharmD

## 2017-02-06 NOTE — PROGRESS NOTES
Hospitalist Progress Note  Moises Case MD  Office: [phone]        Date of Service:  2017  NAME:  Frederick Watters  :  1950  MRN:  [id number]      Admission Summary:   55-year-old  male with past medical history of type 2 diabetes mellitus, esophagitis,   GERD, GI bleed, anemia, chronic pain, mood disorder, COPD,  hypertension, tobacco abuse, schizophrenia, seizures, stroke presented to the emergency department via EMS from home with   reported altered mental status. Interval history / Subjective:   Pt seen and examined this pm. Lying in bed. States he has no new complaints today. States he had some 2 BMs overnight but none all day today. Nurse confirmed. Assessment & Plan:     1. AMS:   -Etiology is unclear. -EEG was negative for seizures. Family is not traceable . -MRI is still pending. CT head was normal   -Appreciate neuro consult. 2. HTN:   -BP is well controlled on Amlodipine, Metop and Lisinopril. 3. DM type 2:   -BS fairly controlled. -Continue home meds and accuchecks.   -Lantus on hold due to hypoglycemia     4. History of seizures:   -No new episodes in house.   -Continue Depakoate and Lamictal     5. COPD:   -Stable. 6. H/O Stroke:   -Pt is neurologically stable. 7. Hyperlipidemia:   -Continue  Zocor     8. History of GI bleed:   -ASA on hold pending MRI but can resume on discharge     VTE prophylaxis: SCD/lovenox  Discussed plan of care with Patient    Code status: Full code    Care Plan discussed with: Patient  Disposition: TBD     Hospital Problems  Date Reviewed: 2016          Codes Class Noted POA    Altered mental status ICD-10-CM: R41.82  ICD-9-CM: 780.97  2017 Unknown                Review of Systems:   Pertinent items are noted in HPI. Vital Signs:    Last 24hrs VS reviewed since prior progress note.  Most recent are:  Visit Vitals    /66 (BP 1 Location: Right arm, BP Patient Position: At rest)    Pulse (!) 53    Temp 98.4 °F (36.9 °C)    Resp 16    Ht 5' 8\" (1.727 m)    Wt 92.9 kg (204 lb 11.2 oz)    SpO2 96%    BMI 31.12 kg/m2         Intake/Output Summary (Last 24 hours) at 02/06/17 1839  Last data filed at 02/06/17 0902   Gross per 24 hour   Intake                0 ml   Output             2400 ml   Net            -2400 ml        Physical Examination:      General:  Alert, cooperative, no distress, appears stated age. Lungs:  Clear to auscultation bilaterally. Chest wall:  No tenderness or deformity. Heart:  Regular rate and rhythm, S1, S2 normal, no murmur, click, rub or gallop. Abdomen:  Soft, non-tender. Bowel sounds normal. No masses, No organomegaly. Extremities: Extremities normal, atraumatic, no cyanosis or edema. Pulses: 2+ and symmetric all extremities. Skin: Skin color, texture, turgor normal. No rashes or lesions   Neurologic: CNII-XII intact. Data Review:    Review and/or order of clinical lab test      Labs:     Recent Labs      02/04/17 0233 02/03/17 2051   WBC  9.7  10.4   HGB  12.4  12.1   HCT  37.6  36.1*   PLT  162  151     Recent Labs      02/04/17 0233 02/03/17 2051   NA  146*  141   K  4.0  4.1   CL  109*  106   CO2  28  26   BUN  18  17   CREA  1.13  1.29   GLU  144*  128*   CA  8.9  9.0   MG  1.7   --    PHOS  3.1   --      Recent Labs      02/03/17 2051   SGOT  20   ALT  21   AP  144*   TBILI  0.3   TP  7.3   ALB  3.1*   GLOB  4.2*        Lab Results   Component Value Date/Time    Folate 17.2 05/10/2014 03:26 AM      No results for input(s): PH, PCO2, PO2 in the last 72 hours.   Recent Labs      02/04/17 0233 02/03/17 2051   CPK   --   195   TROIQ  <0.04  <0.04     Lab Results   Component Value Date/Time    Cholesterol, total 109 02/04/2017 02:33 AM    HDL Cholesterol 39 02/04/2017 02:33 AM    LDL, calculated 50.8 02/04/2017 02:33 AM    Triglyceride 96 02/04/2017 02:33 AM CHOL/HDL Ratio 2.8 02/04/2017 02:33 AM     Lab Results   Component Value Date/Time    Glucose (POC) 171 02/06/2017 04:48 PM    Glucose (POC) 213 02/06/2017 12:10 PM    Glucose (POC) 104 02/06/2017 08:27 AM    Glucose (POC) 163 02/05/2017 09:30 PM    Glucose (POC) 142 02/05/2017 05:15 PM    Glucose (POC) 84 09/20/2009 09:21 AM    Glucose (POC) 111 08/29/2009 03:50 PM     Lab Results   Component Value Date/Time    Color YELLOW/STRAW 02/03/2017 09:21 PM    Appearance CLEAR 02/03/2017 09:21 PM    Specific gravity 1.014 02/03/2017 09:21 PM    Specific gravity 1.010 09/24/2009 11:45 AM    pH (UA) 5.5 02/03/2017 09:21 PM    Protein NEGATIVE  02/03/2017 09:21 PM    Glucose NEGATIVE  02/03/2017 09:21 PM    Ketone NEGATIVE  02/03/2017 09:21 PM    Bilirubin NEGATIVE  02/03/2017 09:21 PM    Urobilinogen 1.0 02/03/2017 09:21 PM    Nitrites NEGATIVE  02/03/2017 09:21 PM    Leukocyte Esterase NEGATIVE  02/03/2017 09:21 PM    Epithelial cells FEW 02/03/2017 09:21 PM    Bacteria NEGATIVE  02/03/2017 09:21 PM    WBC 0-4 02/03/2017 09:21 PM    RBC 0-5 02/03/2017 09:21 PM         ______________________________________________________________________  EXPECTED LENGTH OF STAY: 2d 16h  ACTUAL LENGTH OF STAY:  3 Days               Moises Case MD

## 2017-02-07 LAB
GLUCOSE BLD STRIP.AUTO-MCNC: 103 MG/DL (ref 65–100)
GLUCOSE BLD STRIP.AUTO-MCNC: 205 MG/DL (ref 65–100)
GLUCOSE BLD STRIP.AUTO-MCNC: 210 MG/DL (ref 65–100)
GLUCOSE BLD STRIP.AUTO-MCNC: 262 MG/DL (ref 65–100)
SERVICE CMNT-IMP: ABNORMAL

## 2017-02-07 PROCEDURE — 74011250637 HC RX REV CODE- 250/637: Performed by: INTERNAL MEDICINE

## 2017-02-07 PROCEDURE — 82962 GLUCOSE BLOOD TEST: CPT

## 2017-02-07 PROCEDURE — 74011636637 HC RX REV CODE- 636/637: Performed by: FAMILY MEDICINE

## 2017-02-07 PROCEDURE — 97116 GAIT TRAINING THERAPY: CPT

## 2017-02-07 PROCEDURE — 97535 SELF CARE MNGMENT TRAINING: CPT

## 2017-02-07 PROCEDURE — 65660000000 HC RM CCU STEPDOWN

## 2017-02-07 PROCEDURE — 74011000258 HC RX REV CODE- 258: Performed by: INTERNAL MEDICINE

## 2017-02-07 RX ADMIN — Medication 10 ML: at 06:56

## 2017-02-07 RX ADMIN — OXCARBAZEPINE 150 MG: 150 TABLET, FILM COATED ORAL at 09:54

## 2017-02-07 RX ADMIN — DIVALPROEX SODIUM 1000 MG: 500 TABLET, FILM COATED, EXTENDED RELEASE ORAL at 09:53

## 2017-02-07 RX ADMIN — Medication 10 ML: at 15:20

## 2017-02-07 RX ADMIN — INSULIN LISPRO 3 UNITS: 100 INJECTION, SOLUTION INTRAVENOUS; SUBCUTANEOUS at 23:20

## 2017-02-07 RX ADMIN — LAMOTRIGINE 50 MG: 25 TABLET ORAL at 18:27

## 2017-02-07 RX ADMIN — QUETIAPINE FUMARATE 50 MG: 25 TABLET, FILM COATED ORAL at 23:20

## 2017-02-07 RX ADMIN — METOPROLOL TARTRATE 75 MG: 50 TABLET ORAL at 18:27

## 2017-02-07 RX ADMIN — AMLODIPINE BESYLATE 10 MG: 5 TABLET ORAL at 09:53

## 2017-02-07 RX ADMIN — Medication 10 ML: at 23:20

## 2017-02-07 RX ADMIN — SODIUM CHLORIDE 75 ML/HR: 450 INJECTION, SOLUTION INTRAVENOUS at 12:27

## 2017-02-07 RX ADMIN — METOPROLOL TARTRATE 75 MG: 50 TABLET ORAL at 09:53

## 2017-02-07 RX ADMIN — INSULIN LISPRO 3 UNITS: 100 INJECTION, SOLUTION INTRAVENOUS; SUBCUTANEOUS at 14:41

## 2017-02-07 RX ADMIN — LISINOPRIL 40 MG: 10 TABLET ORAL at 09:54

## 2017-02-07 RX ADMIN — LAMOTRIGINE 50 MG: 25 TABLET ORAL at 09:53

## 2017-02-07 RX ADMIN — INSULIN LISPRO 3 UNITS: 100 INJECTION, SOLUTION INTRAVENOUS; SUBCUTANEOUS at 18:26

## 2017-02-07 RX ADMIN — OXCARBAZEPINE 150 MG: 150 TABLET, FILM COATED ORAL at 18:27

## 2017-02-07 RX ADMIN — SIMVASTATIN 20 MG: 20 TABLET, FILM COATED ORAL at 23:20

## 2017-02-07 NOTE — DISCHARGE INSTRUCTIONS
Discharge Instructions       PATIENT ID: Isaac Rios  MRN: [id number]   YOB: 1950    DATE OF ADMISSION: 2/3/2017  8:38 PM    DATE OF DISCHARGE: 2/8/2017    PRIMARY CARE PROVIDER: Kvng Oro MD     ATTENDING PHYSICIAN: Amando Howe MD  DISCHARGING PROVIDER: Amando Howe MD    To contact this individual call [phone] and ask the  to page. If unavailable ask to be transferred the Adult Hospitalist Department. DISCHARGE DIAGNOSES     Altered mental status    Seizure disorder    DM-2    HTN    COPD    Prior history of CVA      CONSULTATIONS: IP CONSULT TO HOSPITALIST  IP CONSULT TO NEUROLOGY    PROCEDURES/SURGERIES: * No surgery found *    PENDING TEST RESULTS:   At the time of discharge the following test results are still pending: None. FOLLOW UP APPOINTMENTS:   Follow-up Information     Follow [facility] With Details Comments [facility]   [address] [facility] [address] [facility] [address] Kvng Oro MD In 1 week  [address] [phone]       Today             ADDITIONAL CARE RECOMMENDATIONS: None     DIET: Cardiac Diet and Diabetic Diet     ACTIVITY: Activity as tolerated. No driving.     WOUND CARE: None     EQUIPMENT needed: None      DISCHARGE MEDICATIONS:   See Medication Reconciliation Form    · It is important that you take the medication exactly as they are prescribed. · Keep your medication in the bottles provided by the pharmacist and keep a list of the medication names, dosages, and times to be taken in your wallet. · Do not take other medications without consulting your doctor. NOTIFY YOUR PHYSICIAN FOR ANY OF THE FOLLOWING:   Fever over 101 degrees for 24 hours. Chest pain, shortness of breath, fever, chills, nausea, vomiting, diarrhea, change in mentation, falling, weakness, bleeding. Severe pain or pain not relieved by medications.   Or, any other signs or symptoms that you may have questions about.       DISPOSITION:   Home With:   OT  PT  HH  RN       SNF/Inpatient Rehab/LTAC    Independent/assisted living    Hospice   x Other:  Group home     CDMP Checked:   Yes x     PROBLEM LIST Updated:  Yes x       Signed:   Tonda Gottron, MD  2/8/2017  10:16 AM

## 2017-02-07 NOTE — PROGRESS NOTES
Bedside shift change report given to IVANNA Lilly (oncoming nurse) by Derrell Velasquez (offgoing nurse). Report included the following information SBAR, Kardex, Intake/Output, MAR, Recent Results and Cardiac Rhythm [id number].

## 2017-02-07 NOTE — PROGRESS NOTES
Bedside shift change report given to Jayro Odonnell RN (oncoming nurse) by Joanie Momin RN (offgoing nurse). Report included the following information SBAR, Kardex, ED Summary, OR Summary, Procedure Summary, Intake/Output, MAR, Accordion, Recent Results, Med Rec Status and Cardiac Rhythm Sinus [place] Grammes.

## 2017-02-07 NOTE — PROGRESS NOTES
Problem: Mobility Impaired (Adult and Pediatric)  Goal: *Acute Goals and Plan of Care (Insert Text)  Physical Therapy Goals  Initiated 2/4/2017  1. Patient will move from supine to sit and sit to supine in bed with modified independence within 5 day(s). 2. Patient will transfer from bed to chair and chair to bed with modified independence using the least restrictive device within 5 day(s). 3. Patient will perform sit to stand with modified independence within 5 day(s). 4. Patient will ambulate with supervision/set-up for 150 feet with the least restrictive device within 5 day(s). 5. Patient will ascend/descend 4 stairs with 2 handrail(s) with supervision/set-up within 5 day(s). PHYSICAL THERAPY TREATMENT  Patient: Kristian Tavarez (25 y.o. male)  Date: 2/7/2017  Diagnosis: Altered mental status Altered mental status       Precautions: Fall      ASSESSMENT:  Patient received finishing OT session. Seated in chair. Agreeable to gait train with PT. Pt ambulated 20 ft with rolling walker and  CGA x 1. Required verbal cues and manual assistance for walker management as patient having difficulty staying within walker frame. Continues to demonstrates a shuffled gait pattern with decreased step length B. Required constant cueing during ambulation to take bigger steps and managing walker. Returned to bedside chair and demonstrates controlled decent and good safety awareness. Patient to return to group home where he ambulated with RW and CGA. Progression toward goals:  [X]    Improving appropriately and progressing toward goals  [ ]    Improving slowly and progressing toward goals  [ ]    Not making progress toward goals and plan of care will be adjusted       PLAN:  Patient continues to benefit from skilled intervention to address the above impairments. Continue treatment per established plan of care.   Discharge Recommendations:  24hr assistance  Further Equipment Recommendations for Discharge:  rolling walker SUBJECTIVE:   Patient stated You going to leave me now.       OBJECTIVE DATA SUMMARY:   Critical Behavior:  Neurologic State: Alert  Orientation Level: Oriented to person, Oriented to place, Disoriented to time  Cognition: Appropriate for age attention/concentration, Follows commands  Safety/Judgement: Awareness of environment  Functional Mobility Training:  Bed Mobility:  Rolling: Supervision;Assist x1  Supine to Sit: Minimum assistance;Assist x1 (bed flat)              Transfers:  Sit to Stand: Contact guard assistance;Assist x1           Bed to Chair: Contact guard assistance;Assist x1                    Balance:  Sitting: Intact  Sitting - Static: Good (unsupported)  Sitting - Dynamic: Good (unsupported)  Standing: Impaired  Standing - Static: Fair  Standing - Dynamic : Fair  Ambulation/Gait Training:  Distance (ft): 20 Feet (ft)  Assistive Device: Gait belt;Walker, rolling  Ambulation - Level of Assistance: Contact guard assistance        Gait Abnormalities: Decreased step clearance;Shuffling gait; Path deviations        Base of Support: Narrowed     Speed/Sulma: Shuffled; Slow  Step Length: Right shortened;Left shortened                 Pain:  Pain Scale 1: Numeric (0 - 10)  Pain Intensity 1: 0              Activity Tolerance:   No apparent distress  Please refer to the flowsheet for vital signs taken during this treatment.   After treatment:   [X]    Patient left in no apparent distress sitting up in chair  [ ]    Patient left in no apparent distress in bed  [X]    Call bell left within reach  [X]    Nursing notified  [ ]    Caregiver present  [X]    Bed alarm activated      COMMUNICATION/COLLABORATION:   The patients plan of care was discussed with: Certified Occupational Therapy Assistant and Registered Nurse     Jackie Barriga PT, DPT   Time Calculation: 16 mins

## 2017-02-07 NOTE — PROGRESS NOTES
Chart reviewed for transitions of care, discussed patient during rounds. Patient was admitted after being found unresponsive at his group home ([address]). Patient has current diagnoses of schizophrenia, COPD, DM, esophagitis, GERD, GI bleed, anemia, chronic pain, HTN, stroke. Patient was evaluated by the therapies yesterday and they recommended rehab vs SNF. Unfortunately, patient only has medicaid, so he would not be able to go to a SNF and would not qualify for rehab due to lack of a diagnosis. Cm met with patient to explain role and offer support. Patient was alert and oriented to person, place, time. Cm informed him that we were waiting to see how he does today with therapy, to determine what would be best for him. Patient stated that he feels he is fine to go back to his group home. If patient does return to his group home, we will need home health orders for PT and OT. Ariel spoke with patients' sister on the phone Champ Greco, [phone]) to update on the possible discharge for tomorrow. She asked that the doctor give her a call. Ariel left an FYI for the MD. For questions relating to what can be provided at the group home, she suggested we call the  (which is the patients' cousin) Kirby Murphy, [phone].     3:14 pm: Ariel spoke with Kirby Murphy,  at the group home, regarding patients' progress with therapy today. Patient requires stand by assist and Ms. Baldemar Aguirre confirmed that they are already providing that. In regards to the home health company, they would like to use [facility]. Once orders are written, cm will submit it to them. Ms. Baldemar Aguirre will  the patient once discharged.   [employer] , [place]

## 2017-02-07 NOTE — PROGRESS NOTES
Orders written for home health and submitted to DONNA MEDRANO [facility]. See previous CM note for details on discharge.   [employer] , [place]

## 2017-02-07 NOTE — PROGRESS NOTES
Problem: Self Care Deficits Care Plan (Adult)  Goal: *Acute Goals and Plan of Care (Insert Text)  Occupational Therapy Goals  Initiated 2/4/2017  1. Patient will perform gathering ADL items high and low 4/4 with supervision within 7 day. 2. Patient will perform standing ADLs/therapeutic activities 10 mins with supervision/set-up within 7 day(s). 3. Patient will perform simple home management (i.e. Making bed, putting away laundry, simple snack prep) if patient performs at baseline with moderate assistance within 7 day(s). 4. Patient will perform toilet transfers with supervision/set-up within 7 day(s). 5. Patient will perform all aspects of toileting with modified independence within 7 day(s). OCCUPATIONAL THERAPY TREATMENT  Patient: Alvarado Laureano (37 y.o. male)  Date: 2/7/2017  Diagnosis: Altered mental status <principal problem not specified>       Precautions: Fall  Chart, occupational therapy assessment, plan of care, and goals were reviewed. ASSESSMENT:  Cleared by nurse. Received in bed. Patient moved supine to sit with bed flat with bed rail and min assist. He participated in active and simulated self care with set up to mod assist and used RW to ambulate to sink, stand to wash hands and complete 5 min in standing, then ambulated to chair with CGA. He is oriented to place and name. Reoriented to time. He follows 100% of commands and demonstrated appropriate safety judgement when he started to sit down in chair, recognized that he was not all the way backed up to chair, corrected positioning without cues and then sat. Recommend return to group home with CGA to min assist for self care and mobility. Recommend patient OOB to chair 3 times a day and participating in self care as able safely.   Progression toward goals:  [X]          Improving appropriately and progressing toward goals  [ ]          Improving slowly and progressing toward goals  [ ]          Not making progress toward goals and plan of care will be adjusted       PLAN:  Patient continues to benefit from skilled intervention to address the above impairments. Continue treatment per established plan of care. Discharge Recommendations:  None  Further Equipment Recommendations for Discharge:  none       SUBJECTIVE:   Patient stated Can I have a toothbrush? Jadon Blanco      OBJECTIVE DATA SUMMARY:   Cognitive/Behavioral Status:  Neurologic State: Alert  Orientation Level: Oriented to person;Oriented to place; Disoriented to time  Cognition: Appropriate for age attention/concentration; Follows commands  Perception: Appears intact  Perseveration: No perseveration noted  Safety/Judgement: Awareness of environment  Functional Mobility and Transfers for ADLs:              Bed Mobility:  Rolling: Supervision;Assist x1  Supine to Sit: Minimum assistance;Assist x1 (bed flat)                    Transfers:  Sit to Stand: Contact guard assistance;Assist x1     Bed to Chair: Contact guard assistance;Assist x1                                Toilet Transfer : Contact guard assistance;Assist x1                                                              Balance:  Sitting: Intact  Sitting - Static: Good (unsupported)  Sitting - Dynamic: Good (unsupported)  Standing: Impaired  Standing - Static: Fair  Standing - Dynamic : Fair  ADL Intervention:        Grooming  Washing Hands: Supervision/set-up (standing at sink)     Upper Body Bathing  Bathing Assistance: Supervision/set-up (in simulation)  Position Performed: Seated edge of bed     Lower Body Bathing  Lower Body : Minimum assistance (in simulation)  Position Performed: Seated edge of bed           Lower Body Dressing Assistance  Socks: Moderate assistance  Leg Crossed Method Used: No  Position Performed: Bending forward method;Seated edge of bed           Cognitive Retraining  Orientation Retraining: Reorienting;Time  Organizing/Sequencing: Breaking task down  Following Commands:  Follows one step commands/directions  Safety/Judgement: Awareness of environment           Activity Tolerance:    Fair  Please refer to the flowsheet for vital signs taken during this treatment.   After treatment:   [X]  Patient left in no apparent distress sitting up in chair  [ ]  Patient left in no apparent distress in bed  [X]  Call bell left within reach  [X]  Nursing notified  [ ]  Caregiver present  [X]  Bed alarm activated      COMMUNICATION/COLLABORATION:   The patients plan of care was discussed with: Physical Therapist, Registered Nurse and      TALIB Holland  Time Calculation: 30 mins

## 2017-02-07 NOTE — DISCHARGE SUMMARY
Discharge Summary       PATIENT ID: Jhoana Ambrocio  MRN: [id number]   YOB: 1950    DATE OF ADMISSION: 2/3/2017  8:38 PM    DATE OF DISCHARGE: 02/08/17   PRIMARY CARE PROVIDER: Janey Archuleta MD     ATTENDING PHYSICIAN: Dana Funk. MD Manpreet  DISCHARGING PROVIDER: Dana Case MD    To contact this individual call [phone] and ask the  to page. If unavailable ask to be transferred the Adult Hospitalist Department. CONSULTATIONS: IP CONSULT TO HOSPITALIST  IP CONSULT TO NEUROLOGY    PROCEDURES/SURGERIES: * No surgery found *    ADMITTING DIAGNOSES & HOSPITAL COURSE:   CHIEF COMPLAINT: The patient does not provide.     HISTORY OF PRESENT ILLNESS: A 14-year-old    male with past medical history of type 2 diabetes mellitus, esophagitis,   GERD, GI bleed, anemia, chronic pain, mood disorder, COPD,   hypertension, tobacco abuse, schizophrenia, seizures, stroke,   presented to the emergency department via EMS from home with   reported altered mental status. The patient is a poor historian. Majority   of history was obtained from review of ED and medical reports. Per the   collective reports, EMS was called to the scene as the patient's family   found the patient slumped over with food in his mouth. The patient's   family reportedly removed the food from the patient's mouth. EMS   arrived at the scene and found the patient unresponsive and only   moaning. He reportedly had some increased responsiveness while en   route; however, still was sluggish, hypotensive with blood pressure   80/59 bradycardic with heart rate of 54 beats per minute. On arrival in   the emergency department, he is reported disoriented. Initially, there   were some concerns for seizures per the family, which notably is   without shaking. There was no reported definite seizures noted today. According to the ER reports, the patient had complaints of some back   pain.  There is no definite reports of any slurred speech, facial droop,   focal weakness, recent falls, head or neck trauma. No complaints of   chest pain, shortness of breath, cough, congestion, abdominal pain,   nausea, vomiting, diarrhea, melena, dysuria, hematuria, calf pain,   swelling, edema, or other constitutional symptoms other than those   mentioned. There are no reports of fever or chills. On arrival in the   emergency department, initial recorded vital signs were blood pressure   102/68, heart rate 66, respiratory rate 18, saturation 100% on room air. Labs showed elevated lactic acid 2.3. Per the ED, the patient was   given 0.9% normal saline 1000 mL IV fluid bolus x1. Workup included   CT of the head without contrast, which showed no acute intracranial   abnormalities. Hospitalist was then called to admit the patient to the   medical service for continued evaluation and treatment.           DISCHARGE DIAGNOSES / PLAN:    Pt was admitted to the hospitalist service and managed for:     1. AMS:   -Etiology is unclear. -EEG was negative for seizures. -CT and MRI of the head was unremarkable for any acute process. -Appreciate neuro consult.      2. HTN:   -BP was well controlled on Amlodipine, Metop and Lisinopril.      3. DM type 2:   -BS was fairly controlled on insulin sliding scale and diabetic diet.   -Lantus was held for an hypoglycemic episode but will resume on discharge. I suspect this might be due to complying with diabetic diet in the hospital. Will not change Lantus dose on discharge.      4. History of seizures:   -No new episodes in the hospital  -Pt was continued and will continue same dose of Depakoate and Lamictal on discharge.     5. COPD:   -Stable. No respiratory issues during his hospital stay.      6. H/O Stroke:   -Pt was neurologically stable.      7. Hyperlipidemia:   -Continued on usual dose of Zocor      8.  History of GI bleed:   -ASA was held prior to MRI but can resume on discharge     PENDING TEST RESULTS:   At the time of discharge the following test results are still pending: None    FOLLOW UP APPOINTMENTS:    Follow-up Information     Follow up With Details Comments Contact Info    [facility]   [address] Sabas Caban MD In 1 week  [address] [phone]       Today             ADDITIONAL CARE RECOMMENDATIONS: None    DIET: Cardiac Diet and Diabetic Diet    ACTIVITY: Activity as tolerated. No driving. WOUND CARE: None    EQUIPMENT needed: None      DISCHARGE MEDICATIONS:  Current Discharge Medication List      CONTINUE these medications which have NOT CHANGED    Details   pantoprazole (PROTONIX) 40 mg tablet Take 40 mg by mouth two (2) times a day. lisinopril (PRINIVIL, ZESTRIL) 40 mg tablet Take 40 mg by mouth daily. bisacodyl (DULCOLAX) 10 mg suppository Insert 10 mg into rectum daily as needed for Other (constipation). Qty: 15 Suppository, Refills: 0      insulin glargine (LANTUS) 100 unit/mL injection 20 Units by SubCUTAneous route nightly. Indications: TYPE 2 DIABETES MELLITUS  Qty: 1 Vial, Refills: 0      haloperidol (HALDOL) 5 mg tablet Take 5 mg by mouth every twelve (12) hours. !! QUEtiapine (SEROQUEL) 100 mg tablet Take 50 mg by mouth daily. !! QUEtiapine (SEROQUEL) 100 mg tablet Take 200 mg by mouth nightly. benztropine (COGENTIN) 1 mg tablet Take 1 mg by mouth every twelve (12) hours. lamoTRIgine (LAMICTAL) 25 mg tablet Take 50 mg by mouth two (2) times a day. OXcarbazepine (TRILEPTAL) 150 mg tablet Take 150 mg by mouth two (2) times a day. divalproex ER (DEPAKOTE ER) 500 mg ER tablet Take 2 Tabs by mouth daily. Qty: 90 Tab, Refills: 0      tamsulosin (FLOMAX) 0.4 mg capsule Take 0.4 mg by mouth daily. oxybutynin chloride XL (DITROPAN XL) 10 mg CR tablet Take 10 mg by mouth daily.       docusate sodium (COLACE) 100 mg capsule Take 100 mg by mouth daily. amLODIPine (NORVASC) 10 mg tablet Take 10 mg by mouth daily. ferrous sulfate 325 mg (65 mg iron) tablet Take 325 mg by mouth two (2) times a day. multivitamin (ONE A DAY) tablet Take 1 Tab by mouth daily. metoprolol (LOPRESSOR) 50 mg tablet Take 75 mg by mouth two (2) times a day. metFORMIN (GLUCOPHAGE) 500 mg tablet Take 500 mg by mouth daily (with breakfast). simvastatin (ZOCOR) 20 mg tablet Take 20 mg by mouth nightly. !! - Potential duplicate medications found. Please discuss with provider. NOTIFY YOUR PHYSICIAN FOR ANY OF THE FOLLOWING:   Fever over 101 degrees for 24 hours. Chest pain, shortness of breath, fever, chills, nausea, vomiting, diarrhea, change in mentation, falling, weakness, bleeding. Severe pain or pain not relieved by medications. Or, any other signs or symptoms that you may have questions about. DISPOSITION:   X Home With:   OT  PT X HH  RN       Long term SNF/Inpatient Rehab    Independent/assisted living    Hospice    Other:       PATIENT CONDITION AT DISCHARGE:     Functional status    Poor     Deconditioned    X Independent      Cognition   X  Lucid     Forgetful     Dementia      Catheters/lines (plus indication)    Cook     PICC     PEG    X None      Code status    X Full code     DNR      PHYSICAL EXAMINATION AT DISCHARGE:  General:  Alert, cooperative, no distress, appears stated age. Lungs:  Clear to auscultation bilaterally. Chest wall:  No tenderness or deformity. Heart:  Regular rate and rhythm, S1, S2 normal, no murmur, click, rub or gallop. Abdomen:  Soft, non-tender. Bowel sounds normal. No masses, No organomegaly. Extremities: Extremities normal, atraumatic, no cyanosis or edema. Pulses: 2+ and symmetric all extremities. Skin: Skin color, texture, turgor normal. No rashes or lesions   Neurologic: CNII-XII intact.             CHRONIC MEDICAL DIAGNOSES:  Problem List as of 2/7/2017 Date Reviewed: 2/6/2016          Codes Class Noted - Resolved    * (Principal)Altered mental status ICD-10-CM: R41.82  ICD-9-CM: 780.97  2/4/2017 - Present        GI bleed ICD-10-CM: K92.2  ICD-9-CM: 578.9  2/6/2016 - Present        Seizure ([facility] ICD-10-CM: R56.9  ICD-9-CM: 780.39  5/8/2014 - Present        Hypoglycemia, unspecified ICD-10-CM: E16.2  ICD-9-CM: 251.2  9/24/2013 - Present        Paranoid schizophrenia ([facility] ICD-10-CM: F20.0  ICD-9-CM: 295.30  3/11/2012 - Present        HTN (hypertension) ICD-10-CM: I10  ICD-9-CM: 401.9  3/11/2012 - Present        HLD (hyperlipidemia) ICD-10-CM: E78.5  ICD-9-CM: 272.4  3/11/2012 - Present        Anemia, unspecified ICD-10-CM: D64.9  ICD-9-CM: 285.9  3/11/2012 - Present        Impaction of intestine ([facility] ICD-10-CM: K56.49  ICD-9-CM: 560.30  3/11/2012 - Present        Diabetes mellitus ([facility] ICD-10-CM: E11.9  ICD-9-CM: 250.00  3/11/2012 - Present        RESOLVED: Hyperglycemia ICD-10-CM: R73.9  ICD-9-CM: 790.29  1/29/2016 - 2/2/2016        RESOLVED: LAMAR (acute kidney injury) ([facility] ICD-10-CM: N17.9  ICD-9-CM: 584.9  1/29/2016 - 2/2/2016        RESOLVED: Dehydration ICD-10-CM: E86.0  ICD-9-CM: 276.51  3/11/2012 - 3/13/2012        RESOLVED: GI bleed ICD-10-CM: K92.2  ICD-9-CM: 578.9  1/4/2012 - 1/7/2012              Greater than 30 minutes were spent with the patient on counseling and coordination of care    Signed:   Dandre Barreto. MD Manpreet  2/7/2017  3:34 PM        Addendum:   Patient originally slated for discharge on 2/8, difficulties in reaching family to arrange transport back to group home. He remains stable for discharge, will attempt to contact family again for transport back to group home.     Ayana Le MD  2/8/2017  07:39

## 2017-02-08 ENCOUNTER — HOME HEALTH ADMISSION (OUTPATIENT)
Dept: HOME HEALTH SERVICES | Facility: HOME HEALTH | Age: 67
End: 2017-02-08
Payer: MEDICAID

## 2017-02-08 VITALS
WEIGHT: 187.4 LBS | HEIGHT: 68 IN | HEART RATE: 59 BPM | OXYGEN SATURATION: 95 % | TEMPERATURE: 97.8 F | BODY MASS INDEX: 28.4 KG/M2 | DIASTOLIC BLOOD PRESSURE: 74 MMHG | SYSTOLIC BLOOD PRESSURE: 179 MMHG | RESPIRATION RATE: 15 BRPM

## 2017-02-08 LAB
GLUCOSE BLD STRIP.AUTO-MCNC: 230 MG/DL (ref 65–100)
SERVICE CMNT-IMP: ABNORMAL

## 2017-02-08 PROCEDURE — 74011636637 HC RX REV CODE- 636/637: Performed by: FAMILY MEDICINE

## 2017-02-08 PROCEDURE — 74011250637 HC RX REV CODE- 250/637: Performed by: INTERNAL MEDICINE

## 2017-02-08 PROCEDURE — 74011250637 HC RX REV CODE- 250/637: Performed by: HOSPITALIST

## 2017-02-08 PROCEDURE — 82962 GLUCOSE BLOOD TEST: CPT

## 2017-02-08 RX ADMIN — LAMOTRIGINE 50 MG: 25 TABLET ORAL at 09:53

## 2017-02-08 RX ADMIN — Medication 10 ML: at 07:11

## 2017-02-08 RX ADMIN — DIVALPROEX SODIUM 1000 MG: 500 TABLET, FILM COATED, EXTENDED RELEASE ORAL at 09:52

## 2017-02-08 RX ADMIN — INSULIN LISPRO 3 UNITS: 100 INJECTION, SOLUTION INTRAVENOUS; SUBCUTANEOUS at 12:12

## 2017-02-08 RX ADMIN — METOPROLOL TARTRATE 75 MG: 50 TABLET ORAL at 09:53

## 2017-02-08 RX ADMIN — ACETAMINOPHEN 650 MG: 325 TABLET, FILM COATED ORAL at 07:44

## 2017-02-08 RX ADMIN — LISINOPRIL 40 MG: 10 TABLET ORAL at 09:53

## 2017-02-08 RX ADMIN — AMLODIPINE BESYLATE 10 MG: 5 TABLET ORAL at 09:53

## 2017-02-08 RX ADMIN — OXCARBAZEPINE 150 MG: 150 TABLET, FILM COATED ORAL at 09:53

## 2017-02-08 NOTE — PROGRESS NOTES
Patient seen and examined at bedside today. He was medically stable for discharge yesterday afternoon, but staff were unable to contact his cousin or sister to arrange transportation back to the group home. He feels generally well this morning, states that he has some right lower back pain, attributed to sleeping too low in the hospital bed and states that when he has back pain like this, he takes two of the \"best pain pills\" (Tylenol). Visit Vitals    /83    Pulse 70    Temp 98.2 °F (36.8 °C)    Resp 15    Ht 5' 8\" (1.727 m)    Wt 85 kg (187 lb 6.4 oz)    SpO2 95%    BMI 28.49 kg/m2     General:  Alert, cooperative, no distress, appears stated age. Lungs:  Clear to auscultation bilaterally. Chest wall:  No tenderness or deformity. Heart:  Regular rate and rhythm, S1, S2 normal, no murmur, click, rub or gallop. Abdomen:  Soft, non-tender. Bowel sounds normal. No masses, No organomegaly. Extremities: Extremities normal, atraumatic, no cyanosis or edema. Pulses: 2+ and symmetric all extremities. Skin: Skin color, texture, turgor normal. No rashes or lesions   Neurologic: CNII-XII intact. Non-focal.     Assess: 79year old male with altered mental status, etiology unclear, but now resolved, seemingly back to baseline. Differential could still include TIA vs seizure vs medication effect. Plan:   - as documented in discharge summary on 2/8/17, no changes to plan.    - will try to call his sister / cousin again to arrange transportation back to group home with home health services    Ludivina Red MD  2/8/2017  07:30

## 2017-02-08 NOTE — PROGRESS NOTES
Bedside shift change report given to [facility] MELO (oncoming nurse) by Ludwin Mueller (offgoing nurse). Report included the following information SBAR, Kardex, Intake/Output, MAR, Recent Results and Cardiac Rhythm NSR.

## 2017-02-08 NOTE — PROGRESS NOTES
Discharge orders placed for pt at ;  notified RN that placement was not ready    CM spoke with  of group home, pt's cousin [place]) and verified placement and home health orders; per  note Marisol Held was to  patient for discharge.     1745: RN attempted to call [facility] to verify transportation; message left  1800: RN called pt.'s sister, Demarcus Marquez, granddaughter answered and took message for Bang to call back; no call back received    2030: attempted to call pt's sister again; message left; no call back received  2035: attempted to call pt's cousin again; message left; no call back received

## 2017-02-08 NOTE — PROGRESS NOTES
Cm informed that patients' family/group home staff never picked up patient last night. Cm attempted to reach the  again Sherry Dyson, [phone]) and left a message for her to return my call. [address] [facility] [place], [place]    11:16 am: [facility] home care responded that they cannot accept patients' insurance for PT/OT, as the contract is for psych only. Referral sent to [facility]. [address] [facility] Hunter Wilcox    11:34 am: Cm was informed that Byron Lam called while this CM was in rounds and stated she was 'on her way' to get the patient. Byron Lam has not shown up yet. [employer] , [place]    11:50 am: Ariel contacted Byron Lam again and she stated she would be coming to the hospital within an hour to  the patient.    Hunter Durant

## 2017-02-08 NOTE — PROGRESS NOTES
Spiritual Care Assessment/Progress Notes    John Andre [id number]  [id number]    1950  79 y.o.  male    Patient Telephone Number: [phone] (home)   Jewish Affiliation: Jluis Neal   Language: English   Extended Emergency Contact Information  Primary Emergency Contact: [address] Phone: [phone]  Mobile Phone: [phone]  Relation: Sister  Secondary Emergency Contact: [address]  Mobile Phone: [phone]  Relation: Other Relative   Patient Active Problem List    Diagnosis Date Noted    Altered mental status 02/04/2017    GI bleed 02/06/2016    Seizure ([facility] [address] 05/08/2014    Hypoglycemia, unspecified 09/24/2013    Paranoid schizophrenia ([facility] 03/11/2012    HTN (hypertension) 03/11/2012    HLD (hyperlipidemia) 03/11/2012    Anemia, unspecified 03/11/2012    Impaction of intestine ([facility] 03/11/2012    Diabetes mellitus ([facility] 03/11/2012        Date: 2/8/2017       Level of Jewish/Spiritual Activity:  []         Involved in yulissa tradition/spiritual practice    []         Not involved in yulissa tradition/spiritual practice  []         Spiritually oriented    []         Claims no spiritual orientation    []         seeking spiritual identity  []         Feels alienated from Christianity practice/tradition  []         Feels angry about Christianity practice/tradition  [x]         Spirituality/Christianity tradition is not a resource for coping at this time.   []         Not able to assess due to medical condition    Services Provided Today:  []         crisis intervention    []         reading Scriptures  []         spiritual assessment    []         prayer  []         empathic listening/emotional support  []         rites and rituals (cite in comments)  []         life review     []         Christianity support  []         theological development   []         advocacy  []         ethical dialog     []         blessing  [] bereavement support    []         support to family  []         anticipatory grief support   []         help with AMD  []         spiritual guidance    []         meditation      Spiritual Care Needs  []         Emotional Support  []         Spiritual/Gnosticism Care  []         Loss/Adjustment  []         Advocacy/Referral                /Ethics  [x]         No needs expressed at               this time  []         Other: (note in               comments)  [address]  []         Follow up visits with               pt/family  []         Provide materials  []         Schedule sacraments  []         Contact Community               Clergy  []         Follow up as needed  []         Other: (note in               comments)     Comments:   visited patient on Neuro. Patient has declined support at this time. Shay Perdue M.S., M.Div.   [address] ([phone])

## 2017-02-08 NOTE — PROGRESS NOTES
Patient's cousin has arrived to pick patient up for discharge. IV removed. Discharge instructions proved to cousin. Opportunity provided for questions.

## 2017-02-08 NOTE — CONSULTS
Neuro      MRI neg for acute stroke. No change to current management or disposition. Call with any questions.     [place]  [facility]

## 2017-02-09 ENCOUNTER — HOME CARE VISIT (OUTPATIENT)
Dept: SCHEDULING | Facility: HOME HEALTH | Age: 67
End: 2017-02-09
Payer: MEDICAID

## 2017-02-09 PROCEDURE — 400013 HH SOC

## 2017-02-09 PROCEDURE — G0151 HHCP-SERV OF PT,EA 15 MIN: HCPCS

## 2017-02-10 VITALS
TEMPERATURE: 98 F | WEIGHT: 187.39 LBS | RESPIRATION RATE: 16 BRPM | BODY MASS INDEX: 28.4 KG/M2 | DIASTOLIC BLOOD PRESSURE: 70 MMHG | HEIGHT: 68 IN | OXYGEN SATURATION: 95 % | HEART RATE: 80 BPM | SYSTOLIC BLOOD PRESSURE: 130 MMHG

## 2017-02-12 ENCOUNTER — HOME CARE VISIT (OUTPATIENT)
Dept: SCHEDULING | Facility: HOME HEALTH | Age: 67
End: 2017-02-12
Payer: MEDICAID

## 2017-02-12 VITALS
SYSTOLIC BLOOD PRESSURE: 110 MMHG | TEMPERATURE: 98.4 F | DIASTOLIC BLOOD PRESSURE: 70 MMHG | OXYGEN SATURATION: 98 % | HEART RATE: 64 BPM | RESPIRATION RATE: 16 BRPM

## 2017-02-12 PROCEDURE — G0152 HHCP-SERV OF OT,EA 15 MIN: HCPCS

## 2017-02-13 ENCOUNTER — HOME CARE VISIT (OUTPATIENT)
Dept: SCHEDULING | Facility: HOME HEALTH | Age: 67
End: 2017-02-13
Payer: MEDICAID

## 2017-02-13 VITALS
OXYGEN SATURATION: 92 % | RESPIRATION RATE: 16 BRPM | DIASTOLIC BLOOD PRESSURE: 80 MMHG | HEART RATE: 70 BPM | SYSTOLIC BLOOD PRESSURE: 140 MMHG

## 2017-02-13 PROCEDURE — G0151 HHCP-SERV OF PT,EA 15 MIN: HCPCS

## 2017-02-15 ENCOUNTER — HOME CARE VISIT (OUTPATIENT)
Dept: SCHEDULING | Facility: HOME HEALTH | Age: 67
End: 2017-02-15
Payer: MEDICAID

## 2017-02-15 VITALS
SYSTOLIC BLOOD PRESSURE: 136 MMHG | RESPIRATION RATE: 16 BRPM | OXYGEN SATURATION: 90 % | DIASTOLIC BLOOD PRESSURE: 70 MMHG | HEART RATE: 90 BPM

## 2017-02-15 VITALS
RESPIRATION RATE: 16 BRPM | DIASTOLIC BLOOD PRESSURE: 84 MMHG | TEMPERATURE: 98.1 F | OXYGEN SATURATION: 91 % | SYSTOLIC BLOOD PRESSURE: 150 MMHG | HEART RATE: 80 BPM

## 2017-02-15 PROCEDURE — G0152 HHCP-SERV OF OT,EA 15 MIN: HCPCS

## 2017-02-15 PROCEDURE — G0151 HHCP-SERV OF PT,EA 15 MIN: HCPCS

## 2017-02-16 ENCOUNTER — HOME CARE VISIT (OUTPATIENT)
Dept: SCHEDULING | Facility: HOME HEALTH | Age: 67
End: 2017-02-16
Payer: MEDICAID

## 2017-02-16 VITALS
RESPIRATION RATE: 18 BRPM | SYSTOLIC BLOOD PRESSURE: 155 MMHG | HEART RATE: 65 BPM | DIASTOLIC BLOOD PRESSURE: 70 MMHG | OXYGEN SATURATION: 99 % | TEMPERATURE: 96.6 F

## 2017-02-16 PROCEDURE — G0157 HHC PT ASSISTANT EA 15: HCPCS

## 2017-02-20 ENCOUNTER — HOME CARE VISIT (OUTPATIENT)
Dept: HOME HEALTH SERVICES | Facility: HOME HEALTH | Age: 67
End: 2017-02-20
Payer: MEDICAID

## 2017-02-20 ENCOUNTER — HOME CARE VISIT (OUTPATIENT)
Dept: SCHEDULING | Facility: HOME HEALTH | Age: 67
End: 2017-02-20
Payer: MEDICAID

## 2017-02-20 VITALS
DIASTOLIC BLOOD PRESSURE: 80 MMHG | RESPIRATION RATE: 16 BRPM | OXYGEN SATURATION: 99 % | HEART RATE: 60 BPM | TEMPERATURE: 97.7 F | SYSTOLIC BLOOD PRESSURE: 130 MMHG

## 2017-02-20 VITALS
HEART RATE: 59 BPM | SYSTOLIC BLOOD PRESSURE: 151 MMHG | DIASTOLIC BLOOD PRESSURE: 80 MMHG | RESPIRATION RATE: 16 BRPM | OXYGEN SATURATION: 97 %

## 2017-02-20 PROCEDURE — G0157 HHC PT ASSISTANT EA 15: HCPCS

## 2017-02-20 PROCEDURE — G0152 HHCP-SERV OF OT,EA 15 MIN: HCPCS

## 2017-02-22 ENCOUNTER — HOME CARE VISIT (OUTPATIENT)
Dept: SCHEDULING | Facility: HOME HEALTH | Age: 67
End: 2017-02-22
Payer: MEDICAID

## 2017-02-22 ENCOUNTER — HOME CARE VISIT (OUTPATIENT)
Dept: HOME HEALTH SERVICES | Facility: HOME HEALTH | Age: 67
End: 2017-02-22
Payer: MEDICAID

## 2017-02-22 VITALS
DIASTOLIC BLOOD PRESSURE: 80 MMHG | RESPIRATION RATE: 16 BRPM | OXYGEN SATURATION: 98 % | HEART RATE: 60 BPM | SYSTOLIC BLOOD PRESSURE: 118 MMHG | TEMPERATURE: 97.8 F

## 2017-02-22 PROCEDURE — G0157 HHC PT ASSISTANT EA 15: HCPCS

## 2017-02-22 PROCEDURE — G0152 HHCP-SERV OF OT,EA 15 MIN: HCPCS

## 2017-02-23 VITALS
SYSTOLIC BLOOD PRESSURE: 153 MMHG | OXYGEN SATURATION: 97 % | HEART RATE: 57 BPM | RESPIRATION RATE: 16 BRPM | DIASTOLIC BLOOD PRESSURE: 71 MMHG

## 2017-02-24 ENCOUNTER — HOME CARE VISIT (OUTPATIENT)
Dept: HOME HEALTH SERVICES | Facility: HOME HEALTH | Age: 67
End: 2017-02-24
Payer: MEDICAID

## 2017-02-24 PROCEDURE — G0157 HHC PT ASSISTANT EA 15: HCPCS

## 2017-02-26 VITALS
DIASTOLIC BLOOD PRESSURE: 87 MMHG | OXYGEN SATURATION: 98 % | SYSTOLIC BLOOD PRESSURE: 163 MMHG | HEART RATE: 61 BPM | RESPIRATION RATE: 16 BRPM

## 2017-02-27 ENCOUNTER — HOME CARE VISIT (OUTPATIENT)
Dept: HOME HEALTH SERVICES | Facility: HOME HEALTH | Age: 67
End: 2017-02-27
Payer: MEDICAID

## 2017-02-28 ENCOUNTER — HOME CARE VISIT (OUTPATIENT)
Dept: SCHEDULING | Facility: HOME HEALTH | Age: 67
End: 2017-02-28
Payer: MEDICAID

## 2017-02-28 VITALS
HEART RATE: 60 BPM | SYSTOLIC BLOOD PRESSURE: 130 MMHG | DIASTOLIC BLOOD PRESSURE: 80 MMHG | RESPIRATION RATE: 16 BRPM | OXYGEN SATURATION: 96 %

## 2017-02-28 PROCEDURE — G0151 HHCP-SERV OF PT,EA 15 MIN: HCPCS

## 2017-03-01 ENCOUNTER — HOME CARE VISIT (OUTPATIENT)
Dept: SCHEDULING | Facility: HOME HEALTH | Age: 67
End: 2017-03-01
Payer: MEDICAID

## 2017-03-01 VITALS
HEART RATE: 61 BPM | DIASTOLIC BLOOD PRESSURE: 78 MMHG | RESPIRATION RATE: 16 BRPM | SYSTOLIC BLOOD PRESSURE: 130 MMHG | OXYGEN SATURATION: 97 %

## 2017-03-01 PROCEDURE — G0151 HHCP-SERV OF PT,EA 15 MIN: HCPCS

## 2017-06-27 ENCOUNTER — HOSPITAL ENCOUNTER (EMERGENCY)
Age: 67
Discharge: HOME | End: 2017-06-27
Attending: EMERGENCY MEDICINE
Payer: MEDICAID

## 2017-06-27 ENCOUNTER — APPOINTMENT (OUTPATIENT)
Dept: GENERAL RADIOLOGY | Age: 67
End: 2017-06-27
Attending: EMERGENCY MEDICINE
Payer: MEDICAID

## 2017-06-27 VITALS
DIASTOLIC BLOOD PRESSURE: 77 MMHG | HEIGHT: 68 IN | RESPIRATION RATE: 16 BRPM | WEIGHT: 187 LBS | HEART RATE: 65 BPM | SYSTOLIC BLOOD PRESSURE: 170 MMHG | OXYGEN SATURATION: 95 % | BODY MASS INDEX: 28.34 KG/M2 | TEMPERATURE: 98.9 F

## 2017-06-27 DIAGNOSIS — R07.89 MUSCULOSKELETAL CHEST PAIN: ICD-10-CM

## 2017-06-27 DIAGNOSIS — S20.219A CONTUSION OF CHEST WALL, UNSPECIFIED LATERALITY, INITIAL ENCOUNTER: Primary | ICD-10-CM

## 2017-06-27 LAB
ALBUMIN SERPL BCP-MCNC: 3.1 G/DL (ref 3.5–5)
ALBUMIN/GLOB SERPL: 0.7 {RATIO} (ref 1.1–2.2)
ALP SERPL-CCNC: 95 U/L (ref 45–117)
ALT SERPL-CCNC: 24 U/L (ref 12–78)
ANION GAP BLD CALC-SCNC: 8 MMOL/L (ref 5–15)
APTT PPP: 31.1 SEC (ref 22.1–32.5)
AST SERPL W P-5'-P-CCNC: 16 U/L (ref 15–37)
ATRIAL RATE: 65 BPM
BASOPHILS # BLD AUTO: 0 K/UL (ref 0–0.1)
BASOPHILS # BLD: 0 % (ref 0–1)
BILIRUB SERPL-MCNC: 0.4 MG/DL (ref 0.2–1)
BUN SERPL-MCNC: 12 MG/DL (ref 6–20)
BUN/CREAT SERPL: 10 (ref 12–20)
CALCIUM SERPL-MCNC: 9.1 MG/DL (ref 8.5–10.1)
CALCULATED P AXIS, ECG09: 54 DEGREES
CALCULATED R AXIS, ECG10: 20 DEGREES
CALCULATED T AXIS, ECG11: 35 DEGREES
CHLORIDE SERPL-SCNC: 106 MMOL/L (ref 97–108)
CK MB CFR SERPL CALC: NORMAL % (ref 0–2.5)
CK MB SERPL-MCNC: <1 NG/ML (ref 5–25)
CK SERPL-CCNC: 70 U/L (ref 39–308)
CO2 SERPL-SCNC: 27 MMOL/L (ref 21–32)
CREAT SERPL-MCNC: 1.16 MG/DL (ref 0.7–1.3)
DIAGNOSIS, 93000: NORMAL
EOSINOPHIL # BLD: 0.1 K/UL (ref 0–0.4)
EOSINOPHIL NFR BLD: 1 % (ref 0–7)
ERYTHROCYTE [DISTWIDTH] IN BLOOD BY AUTOMATED COUNT: 12.6 % (ref 11.5–14.5)
GLOBULIN SER CALC-MCNC: 4.7 G/DL (ref 2–4)
GLUCOSE SERPL-MCNC: 215 MG/DL (ref 65–100)
HCT VFR BLD AUTO: 35.6 % (ref 36.6–50.3)
HGB BLD-MCNC: 12.4 G/DL (ref 12.1–17)
INR PPP: 1.1 (ref 0.9–1.1)
LYMPHOCYTES # BLD AUTO: 30 % (ref 12–49)
LYMPHOCYTES # BLD: 2.8 K/UL (ref 0.8–3.5)
MCH RBC QN AUTO: 31.9 PG (ref 26–34)
MCHC RBC AUTO-ENTMCNC: 34.8 G/DL (ref 30–36.5)
MCV RBC AUTO: 91.5 FL (ref 80–99)
MONOCYTES # BLD: 1.2 K/UL (ref 0–1)
MONOCYTES NFR BLD AUTO: 13 % (ref 5–13)
NEUTS SEG # BLD: 5.1 K/UL (ref 1.8–8)
NEUTS SEG NFR BLD AUTO: 56 % (ref 32–75)
P-R INTERVAL, ECG05: 138 MS
PLATELET # BLD AUTO: 155 K/UL (ref 150–400)
POTASSIUM SERPL-SCNC: 3.9 MMOL/L (ref 3.5–5.1)
PROT SERPL-MCNC: 7.8 G/DL (ref 6.4–8.2)
PROTHROMBIN TIME: 11.3 SEC (ref 9–11.1)
Q-T INTERVAL, ECG07: 420 MS
QRS DURATION, ECG06: 76 MS
QTC CALCULATION (BEZET), ECG08: 436 MS
RBC # BLD AUTO: 3.89 M/UL (ref 4.1–5.7)
SODIUM SERPL-SCNC: 141 MMOL/L (ref 136–145)
THERAPEUTIC RANGE,PTTT: NORMAL SECS (ref 58–77)
TROPONIN I SERPL-MCNC: <0.04 NG/ML
VENTRICULAR RATE, ECG03: 65 BPM
WBC # BLD AUTO: 9.3 K/UL (ref 4.1–11.1)

## 2017-06-27 PROCEDURE — 85025 COMPLETE CBC W/AUTO DIFF WBC: CPT | Performed by: EMERGENCY MEDICINE

## 2017-06-27 PROCEDURE — 85730 THROMBOPLASTIN TIME PARTIAL: CPT | Performed by: EMERGENCY MEDICINE

## 2017-06-27 PROCEDURE — 80053 COMPREHEN METABOLIC PANEL: CPT | Performed by: EMERGENCY MEDICINE

## 2017-06-27 PROCEDURE — 71020 XR CHEST PA LAT: CPT

## 2017-06-27 PROCEDURE — 71120 X-RAY EXAM BREASTBONE 2/>VWS: CPT

## 2017-06-27 PROCEDURE — 99285 EMERGENCY DEPT VISIT HI MDM: CPT

## 2017-06-27 PROCEDURE — 82550 ASSAY OF CK (CPK): CPT | Performed by: EMERGENCY MEDICINE

## 2017-06-27 PROCEDURE — 85610 PROTHROMBIN TIME: CPT | Performed by: EMERGENCY MEDICINE

## 2017-06-27 PROCEDURE — 84484 ASSAY OF TROPONIN QUANT: CPT | Performed by: EMERGENCY MEDICINE

## 2017-06-27 PROCEDURE — 93005 ELECTROCARDIOGRAM TRACING: CPT

## 2017-06-27 PROCEDURE — 36415 COLL VENOUS BLD VENIPUNCTURE: CPT | Performed by: EMERGENCY MEDICINE

## 2017-06-27 RX ORDER — NAPROXEN 500 MG/1
500 TABLET ORAL 2 TIMES DAILY WITH MEALS
Qty: 30 TAB | Refills: 0 | Status: SHIPPED | OUTPATIENT
Start: 2017-06-27

## 2017-06-27 RX ORDER — HYDROCODONE BITARTRATE AND ACETAMINOPHEN 5; 325 MG/1; MG/1
1 TABLET ORAL
Qty: 20 TAB | Refills: 0 | Status: SHIPPED | OUTPATIENT
Start: 2017-06-27

## 2017-06-27 NOTE — ED PROVIDER NOTES
HPI Comments: 79 y.o. male with past medical history significant for Stroke, HTN, Seizures, DM, GI bleed who presents from home via EMS for evaluation s/p fall. Pt reports while washing up yesterday and slipped and fell, chest first into his shower seat. Pt states all of his weight fell on the this chair. He c/o midsternal chest pain since injury. The pain is only significant with movement. Pt denies LOC, other injuries, neck pain, back pain, headache, numbness, weakness, recent illness, abdominal pain, nausea, vomiting, diarrhea, constipation or urnary symptoms. PCP: Ramiro Burnette MD    Note written by Henrik George, as dictated by Linus Winters MD 12:38 AM    The history is provided by the patient. No  was used. Past Medical History:   Diagnosis Date    Diabetes ([facility] [address]     Esophagitis     grade 4 - 2/2016    Gastrointestinal disorder     GERD    GIB (gastrointestinal bleeding)     2/2016 required 2 U PRBC    Hypertension     Psychiatric disorder     schizophrenia    Seizures ([facility] [address] [facility]     Stroke [facility])        History reviewed. No pertinent surgical history. History reviewed. No pertinent family history. Social History     Social History    Marital status: SINGLE     Spouse name: N/A    Number of children: N/A    Years of education: N/A     Occupational History    Not on file. Social History Main Topics    Smoking status: Current Every Day Smoker     Packs/day: 1.00    Smokeless tobacco: Never Used    Alcohol use No      Comment: last drink 13 yrs ago    Drug use: No    Sexual activity: No     Other Topics Concern    Not on file     Social History Narrative         ALLERGIES: Thorazine [chlorpromazine]    Review of Systems   Constitutional: Negative for fever. HENT: Negative for congestion and sore throat. Eyes: Negative for photophobia. Respiratory: Negative for cough and shortness of breath.     Cardiovascular: Positive for chest pain (s/p fall). Negative for leg swelling. Gastrointestinal: Negative for abdominal pain, constipation, diarrhea, nausea and vomiting. Genitourinary: Negative for difficulty urinating, dysuria and hematuria. Musculoskeletal: Negative for back pain and neck pain. Skin: Negative for rash. Neurological: Negative for dizziness, weakness, numbness and headaches. - LOC   All other systems reviewed and are negative. Vitals:    06/27/17 0017   BP: 197/83   Pulse: 67   Resp: 25   Temp: 98.4 °F (36.9 °C)   SpO2: 98%   Weight: 84.8 kg (187 lb)   Height: 5' 8\" (1.727 m)            Physical Exam   Nursing note and vitals reviewed. CONSTITUTIONAL: Well-appearing; well-nourished; in mild distress  HEAD: Normocephalic; atraumatic  EYES: PERRL; EOM intact; conjunctiva and sclera are clear bilaterally. ENT: No rhinorrhea; normal pharynx with no tonsillar hypertrophy; mucous membranes pink/moist, no erythema, no exudate. NECK: Supple; non-tender; no cervical lymphadenopathy  CARD: Normal S1, S2; no murmurs, rubs, or gallops. Regular rate and rhythm. RESP: Normal respiratory effort; breath sounds clear and equal bilaterally; no wheezes, rhonchi, or rales. Mid-stermal chest wall tenderness on palpation  ABD: Normal bowel sounds; non-distended; non-tender; no palpable organomegaly, no masses, no bruits. Back Exam: Normal inspection; no vertebral point tenderness, no CVA tenderness. Normal range of motion. EXT: Normal ROM in all four extremities; non-tender to palpation; no swelling or deformity; distal pulses are normal, no edema. SKIN: Warm; dry; no rash.   NEURO:Alert and oriented x 3, coherent, HOMA-XII grossly intact, sensory and motor are non-focal.        MDM  Number of Diagnoses or Management Options  Contusion of chest wall, unspecified laterality, initial encounter:   Musculoskeletal chest pain:   Diagnosis management comments: Assessment: 26-year-old male, who presents with chest pain that is palpable and reproducible status post trauma. The patient will need evaluation for chest wall injury and cardiac contusion. Plan: EKG/ chest x-ray/ sternum X-ray/ lab/ IV fluids and analgesia/ Monitor and Reevaluate. Amount and/or Complexity of Data Reviewed  Clinical lab tests: ordered and reviewed  Tests in the radiology section of CPT®: ordered and reviewed  Tests in the medicine section of CPT®: reviewed and ordered  Discussion of test results with the performing providers: yes  Decide to obtain previous medical records or to obtain history from someone other than the patient: yes  Obtain history from someone other than the patient: yes  Review and summarize past medical records: yes  Discuss the patient with other providers: yes  Independent visualization of images, tracings, or specimens: yes    Risk of Complications, Morbidity, and/or Mortality  Presenting problems: moderate  Diagnostic procedures: moderate  Management options: moderate      ED Course       Procedures     ED EKG interpretation:  Rhythm: normal sinus rhythm; and regular . Rate (approx.): 65; Axis: normal; P wave: normal; QRS interval: normal ; ST/T wave: normal; in  Lead: normal; Other findings: borderline ekg. This EKG was interpreted by Brianna Hare MD,ED Provider. XRAY INTERPRETATION (ED MD)  Chest Xray  No acute process seen. Normal heart size. No bony abnormalities. No infiltrate. Brianna Hare MD 12:34 AM    Jen Party INTERPRETATION (ED MD)  Xray of sternum shows no fracture. No subluxation/dislocation. No bony abnormality. Brianna Hare MD 12:35 AM    Progress Note:   Pt has been reexamined by Brianna Hare MD. Pt is feeling much better. Symptoms have improved. All available results have been reviewed with pt and any available family. Pt understands sx, dx, and tx in ED. Care plan has been outlined and questions have been answered. Pt is ready to go home.  Will send home on musculoskeletal chest pain, and chest wall contusion instruction. Prescription of naproxen, and [place]. Outpatient referral with PCP as needed. Written by Carlos Ford MD,8:42 AM    .   .

## 2017-06-27 NOTE — ED NOTES
Bedbugs found on pt. Caregiver arrived and states they have had them in the group home and are getting treatments. Pt taken to the decon room and given shower. Pt placed in clean paper scrubs and placed in new room.

## 2017-06-27 NOTE — DISCHARGE INSTRUCTIONS
We hope that we have addressed all of your medical concerns. The examination and treatment you received in the Emergency Department were for an emergent problem and were not intended as complete care. It is important that you follow up with your healthcare provider(s) for ongoing care. If your symptoms worsen or do not improve as expected, and you are unable to reach your usual health care provider(s), you should return to the Emergency Department. Today's healthcare is undergoing tremendous change, and patient satisfaction surveys are one of the many tools to assess the quality of medical care. You may receive a survey from the [employer] regarding your experience in the Emergency Department. I hope that your experience has been completely positive, particularly the medical care that I provided. As such, please participate in the survey; anything less than excellent does not meet my expectations or intentions. Replaced by [facility] and [address] [place] [address] participate in nationally recognized quality of care measures. If your blood pressure is greater than 120/80, as reported below, we urge that you seek medical care to address the potential of high blood pressure, commonly known as hypertension. Hypertension can be hereditary or can be caused by certain medical conditions, pain, stress, or \"white coat syndrome. \"       Please make an appointment with your health care provider(s) for follow up of your Emergency Department visit. VITALS:   Patient Vitals for the past 8 hrs:   Temp Pulse Resp BP SpO2   06/27/17 0017 98.4 °F (36.9 °C) 67 25 197/83 98 %          Thank you for allowing us to provide you with medical care today. We realize that you have many choices for your emergency care needs. Please choose us in the future for any continued health care needs. Annamaria Esquivel MD    Replaced by [facility].   Office: [phone]            Recent Results (from the past 24 hour(s))   EKG, 12 LEAD, INITIAL    Collection Time: 06/27/17 12:18 AM   Result Value Ref Range    Ventricular Rate 65 BPM    Atrial Rate 65 BPM    P-R Interval 138 ms    QRS Duration 76 ms    Q-T Interval 420 ms    QTC Calculation (Bezet) 436 ms    Calculated P Axis 54 degrees    Calculated R Axis 20 degrees    Calculated T Axis 35 degrees    Diagnosis       Normal sinus rhythm  When compared with ECG of 03-FEB-2017 22:01,  No significant change was found     CBC WITH AUTOMATED DIFF    Collection Time: 06/27/17 12:45 AM   Result Value Ref Range    WBC 9.3 4.1 - 11.1 K/uL    RBC 3.89 (L) 4.10 - 5.70 M/uL    HGB 12.4 12.1 - 17.0 g/dL    HCT 35.6 (L) 36.6 - 50.3 %    MCV 91.5 80.0 - 99.0 FL    MCH 31.9 26.0 - 34.0 PG    MCHC 34.8 30.0 - 36.5 g/dL    RDW 12.6 11.5 - 14.5 %    PLATELET 359 997 - 411 K/uL    NEUTROPHILS 56 32 - 75 %    LYMPHOCYTES 30 12 - 49 %    MONOCYTES 13 5 - 13 %    EOSINOPHILS 1 0 - 7 %    BASOPHILS 0 0 - 1 %    ABS. NEUTROPHILS 5.1 1.8 - 8.0 K/UL    ABS. LYMPHOCYTES 2.8 0.8 - 3.5 K/UL    ABS. MONOCYTES 1.2 (H) 0.0 - 1.0 K/UL    ABS. EOSINOPHILS 0.1 0.0 - 0.4 K/UL    ABS.  BASOPHILS 0.0 0.0 - 0.1 K/UL   CK W/ CKMB & INDEX    Collection Time: 06/27/17 12:45 AM   Result Value Ref Range    CK 70 39 - 308 U/L    CK - MB <1.0 <3.6 NG/ML    CK-MB Index Cannot be calulated 0 - 2.5     METABOLIC PANEL, COMPREHENSIVE    Collection Time: 06/27/17 12:45 AM   Result Value Ref Range    Sodium 141 136 - 145 mmol/L    Potassium 3.9 3.5 - 5.1 mmol/L    Chloride 106 97 - 108 mmol/L    CO2 27 21 - 32 mmol/L    Anion gap 8 5 - 15 mmol/L    Glucose 215 (H) 65 - 100 mg/dL    BUN 12 6 - 20 MG/DL    Creatinine 1.16 0.70 - 1.30 MG/DL    BUN/Creatinine ratio 10 (L) 12 - 20      GFR est AA >60 >60 ml/min/1.73m2    GFR est non-AA >60 >60 ml/min/1.73m2    Calcium 9.1 8.5 - 10.1 MG/DL    Bilirubin, total 0.4 0.2 - 1.0 MG/DL    ALT (SGPT) 24 12 - 78 U/L    AST (SGOT) 16 15 - 37 U/L    Alk. phosphatase 95 45 - 117 U/L    Protein, total 7.8 6.4 - 8.2 g/dL    Albumin 3.1 (L) 3.5 - 5.0 g/dL    Globulin 4.7 (H) 2.0 - 4.0 g/dL    A-G Ratio 0.7 (L) 1.1 - 2.2     TROPONIN I    Collection Time: 06/27/17 12:45 AM   Result Value Ref Range    Troponin-I, Qt. <0.04 <0.05 ng/mL   PROTHROMBIN TIME + INR    Collection Time: 06/27/17 12:45 AM   Result Value Ref Range    INR 1.1 0.9 - 1.1      Prothrombin time 11.3 (H) 9.0 - 11.1 sec   PTT    Collection Time: 06/27/17 12:45 AM   Result Value Ref Range    aPTT 31.1 22.1 - 32.5 sec    aPTT, therapeutic range     58.0 - 77.0 SECS       Xr Chest Pa Lat    Result Date: 6/27/2017  INDICATION: Chest Pain EXAM: CXR 2 Views. COMPARISON: 2/3/2017. FINDINGS: Frontal and lateral views of the chest show the lungs are free of acute disease. Heart size is normal. There is no overt pulmonary edema. There is no evident pneumothorax, adenopathy or pleural effusion. IMPRESSION: No acute disease. No significant interval change. Xr Sternum    Result Date: 6/27/2017  INDICATION: trauma , chest pain. EXAM: Sternum FINDINGS: Anterior and lateral views of the sternum show no fracture. The retrosternal space is clear. IMPRESSION: No fracture. Chest Contusion: Care Instructions  Your Care Instructions  A chest contusion, or bruise, is caused by a fall or direct blow to the chest. Car crashes, falls, getting punched, and injury from bicycle handlebars are common causes of chest contusions. A very forceful blow to the chest can injure the heart or blood vessels in the chest, the lungs, the airway, the liver, or the spleen. Pain may be caused by an injury to muscles, cartilage, or ribs. Deep breathing, coughing, or sneezing can increase your pain. Lying on the injured area also can cause pain. Follow-up care is a key part of your treatment and safety. Be sure to make and go to all appointments, and call your doctor if you are having problems.  It's also a good idea to know your test results and keep a list of the medicines you take. How can you care for yourself at home? · Rest and protect the injured or sore area. Stop, change, or take a break from any activity that may be causing your pain. · Put ice or a cold pack on the area for 10 to 20 minutes at a time. Put a thin cloth between the ice and your skin. · After 2 or 3 days, if your swelling is gone, apply a heating pad set on low or a warm cloth to your chest. Some doctors suggest that you go back and forth between hot and cold. Put a thin cloth between the heating pad and your skin. · Do not wrap or tape your ribs for support. This may cause you to take smaller breaths, which could increase your risk of pneumonia and lung collapse. · Ask your doctor if you can take an over-the-counter pain medicine, such as acetaminophen (Tylenol), ibuprofen (Advil, Motrin), or naproxen (Aleve). Be safe with medicines. Read and follow all instructions on the label. · Take your medicines exactly as prescribed. Call your doctor if you think you are having a problem with your medicine. · Gentle stretching and massage may help you feel better after a few days of rest. Stretch slowly to the point just before discomfort begins, then hold the stretch for at least 15 to 30 seconds. Do this 3 or 4 times per day. · As your pain gets better, slowly return to your normal activities. Be patient, because chest bruises can take weeks or months to heal. Any increased pain may be a sign that you need to rest a while longer. When should you call for help? Call 911 anytime you think you may need emergency care. For example, call if:  · You have severe trouble breathing. · You cough up blood. Call your doctor now or seek immediate medical care if:  · You have belly pain. · You are dizzy or lightheaded, or you feel like you may faint. · You develop new symptoms with the chest pain. · Your chest pain gets worse.   · You have a fever.  · You have some shortness of breath. · You have a cough that brings up mucus from the lungs. Watch closely for changes in your health, and be sure to contact your doctor if:  · Your chest pain is not improving after 1 week. Where can you learn more? Go to http://jael-barb.info/. Enter I174 in the search box to learn more about \"Chest Contusion: Care Instructions. \"  Current as of: March 20, 2017  Content Version: 11.3  © 5389-3166 [employer]. Care instructions adapted under license by [employer] (which disclaims liability or warranty for this information). If you have questions about a medical condition or this instruction, always ask your healthcare professional. [address] any warranty or liability for your use of this information. Musculoskeletal Chest Pain: Care Instructions  Your Care Instructions  Chest pain is not always a sign that something is wrong with your heart or that you have another serious problem. The doctor thinks your chest pain is caused by strained muscles or ligaments, inflamed chest cartilage, or another problem in your chest, rather than by your heart. You may need more tests to find the cause of your chest pain. Follow-up care is a key part of your treatment and safety. Be sure to make and go to all appointments, and call your doctor if you are having problems. Its also a good idea to know your test results and keep a list of the medicines you take. How can you care for yourself at home? · Take pain medicines exactly as directed. ¨ If the doctor gave you a prescription medicine for pain, take it as prescribed. ¨ If you are not taking a prescription pain medicine, ask your doctor if you can take an over-the-counter medicine. · Rest and protect the sore area. · Stop, change, or take a break from any activity that may be causing your pain or soreness.   · Put ice or a cold pack on the sore area for 10 to 20 minutes at a time. Try to do this every 1 to 2 hours for the next 3 days (when you are awake) or until the swelling goes down. Put a thin cloth between the ice and your skin. · After 2 or 3 days, apply a heating pad set on low or a warm cloth to the area that hurts. Some doctors suggest that you go back and forth between hot and cold. · Do not wrap or tape your ribs for support. This may cause you to take smaller breaths, which could increase your risk of lung problems. · Mentholated creams such as Bengay or Icy Hot may soothe sore muscles. Follow the instructions on the package. · Follow your doctor's instructions for exercising. · Gentle stretching and massage may help you get better faster. Stretch slowly to the point just before pain begins, and hold the stretch for at least 15 to 30 seconds. Do this 3 or 4 times a day. Stretch just after you have applied heat. · As your pain gets better, slowly return to your normal activities. Any increased pain may be a sign that you need to rest a while longer. When should you call for help? Call 911 anytime you think you may need emergency care. For example, call if:  · You have chest pain or pressure. This may occur with:  ¨ Sweating. ¨ Shortness of breath. ¨ Nausea or vomiting. ¨ Pain that spreads from the chest to the neck, jaw, or one or both shoulders or arms. ¨ Dizziness or lightheadedness. ¨ A fast or uneven pulse. After calling 911, chew 1 adult-strength aspirin. Wait for an ambulance. Do not try to drive yourself. · You have sudden chest pain and shortness of breath, or you cough up blood. Call your doctor now or seek immediate medical care if:  · You have any trouble breathing. · Your chest pain gets worse. · Your chest pain occurs consistently with exercise and is relieved by rest.  Watch closely for changes in your health, and be sure to contact your doctor if:  · Your chest pain does not get better after 1 week.   Where can you learn more? Go to http://jael-barb.info/. Enter V293 in the search box to learn more about \"Musculoskeletal Chest Pain: Care Instructions. \"  Current as of: March 20, 2017  Content Version: 11.3  © 6369-4172 [employer]. Care instructions adapted under license by [employer] (which disclaims liability or warranty for this information). If you have questions about a medical condition or this instruction, always ask your healthcare professional. [place] [address] any warranty or liability for your use of this information.

## 2017-06-27 NOTE — ED TRIAGE NOTES
TRIAGE NOTE: PT arrives via EMS from [facility] for GLF yesterday, patient fell and bathtub struck chest.  Pt reports mid chest pain on movement without radiation.

## 2017-06-27 NOTE — ED NOTES
Pt alert and oriented to self, skin warm and dry, not vomiting, respirations unlabored. States he still has chest pain. Pt not grimacing, moaning or other outward signs of pain. States it is 10/10. Pt very calm. Able to ambulate and transfer. Caregiver verbalizes understanding of pt discharge instructions. Helped to car by w/c.

## 2019-01-04 ENCOUNTER — HOSPITAL ENCOUNTER (INPATIENT)
Age: 69
LOS: 5 days | Discharge: HOME HEALTH | DRG: 720 | End: 2019-01-09
Attending: EMERGENCY MEDICINE | Admitting: INTERNAL MEDICINE
Payer: COMMERCIAL

## 2019-01-04 ENCOUNTER — APPOINTMENT (OUTPATIENT)
Dept: CT IMAGING | Age: 69
DRG: 720 | End: 2019-01-04
Attending: EMERGENCY MEDICINE
Payer: COMMERCIAL

## 2019-01-04 ENCOUNTER — APPOINTMENT (OUTPATIENT)
Dept: GENERAL RADIOLOGY | Age: 69
DRG: 720 | End: 2019-01-04
Attending: EMERGENCY MEDICINE
Payer: COMMERCIAL

## 2019-01-04 DIAGNOSIS — N39.0 URINARY TRACT INFECTION WITHOUT HEMATURIA, SITE UNSPECIFIED: Primary | ICD-10-CM

## 2019-01-04 DIAGNOSIS — R41.0 CONFUSION: ICD-10-CM

## 2019-01-04 DIAGNOSIS — A41.9 SEPSIS, DUE TO UNSPECIFIED ORGANISM: ICD-10-CM

## 2019-01-04 PROBLEM — G93.41 ACUTE METABOLIC ENCEPHALOPATHY: Status: ACTIVE | Noted: 2019-01-04

## 2019-01-04 LAB
ALBUMIN SERPL-MCNC: 3.2 G/DL (ref 3.5–5)
ALBUMIN/GLOB SERPL: 0.7 {RATIO} (ref 1.1–2.2)
ALP SERPL-CCNC: 84 U/L (ref 45–117)
ALT SERPL-CCNC: 23 U/L (ref 12–78)
ANION GAP SERPL CALC-SCNC: 6 MMOL/L (ref 5–15)
APPEARANCE UR: ABNORMAL
APTT PPP: 30.6 SEC (ref 22.1–32)
AST SERPL-CCNC: 26 U/L (ref 15–37)
ATRIAL RATE: 68 BPM
BACTERIA URNS QL MICRO: ABNORMAL /HPF
BASOPHILS # BLD: 0 K/UL (ref 0–0.1)
BASOPHILS NFR BLD: 0 % (ref 0–1)
BILIRUB SERPL-MCNC: 0.7 MG/DL (ref 0.2–1)
BILIRUB UR QL: NEGATIVE
BUN SERPL-MCNC: 10 MG/DL (ref 6–20)
BUN/CREAT SERPL: 9 (ref 12–20)
CALCIUM SERPL-MCNC: 8.6 MG/DL (ref 8.5–10.1)
CALCULATED P AXIS, ECG09: 43 DEGREES
CALCULATED R AXIS, ECG10: 31 DEGREES
CALCULATED T AXIS, ECG11: 12 DEGREES
CHLORIDE SERPL-SCNC: 105 MMOL/L (ref 97–108)
CO2 SERPL-SCNC: 32 MMOL/L (ref 21–32)
COLOR UR: ABNORMAL
CREAT SERPL-MCNC: 1.1 MG/DL (ref 0.7–1.3)
DIAGNOSIS, 93000: NORMAL
DIFFERENTIAL METHOD BLD: ABNORMAL
EOSINOPHIL # BLD: 0 K/UL (ref 0–0.4)
EOSINOPHIL NFR BLD: 0 % (ref 0–7)
EPITH CASTS URNS QL MICRO: ABNORMAL /LPF
ERYTHROCYTE [DISTWIDTH] IN BLOOD BY AUTOMATED COUNT: 12.1 % (ref 11.5–14.5)
FLUAV AG NPH QL IA: NEGATIVE
FLUBV AG NOSE QL IA: NEGATIVE
GLOBULIN SER CALC-MCNC: 4.5 G/DL (ref 2–4)
GLUCOSE SERPL-MCNC: 107 MG/DL (ref 65–100)
GLUCOSE UR STRIP.AUTO-MCNC: NEGATIVE MG/DL
HCT VFR BLD AUTO: 37.2 % (ref 36.6–50.3)
HGB BLD-MCNC: 12.9 G/DL (ref 12.1–17)
HGB UR QL STRIP: ABNORMAL
HYALINE CASTS URNS QL MICRO: ABNORMAL /LPF (ref 0–5)
IMM GRANULOCYTES # BLD: 0 K/UL (ref 0–0.04)
IMM GRANULOCYTES NFR BLD AUTO: 0 % (ref 0–0.5)
INR PPP: 1.1 (ref 0.9–1.1)
KETONES UR QL STRIP.AUTO: ABNORMAL MG/DL
LACTATE SERPL-SCNC: 1.8 MMOL/L (ref 0.4–2)
LEUKOCYTE ESTERASE UR QL STRIP.AUTO: ABNORMAL
LIPASE SERPL-CCNC: 89 U/L (ref 73–393)
LYMPHOCYTES # BLD: 3.1 K/UL (ref 0.8–3.5)
LYMPHOCYTES NFR BLD: 21 % (ref 12–49)
MAGNESIUM SERPL-MCNC: 1.6 MG/DL (ref 1.6–2.4)
MCH RBC QN AUTO: 33 PG (ref 26–34)
MCHC RBC AUTO-ENTMCNC: 34.7 G/DL (ref 30–36.5)
MCV RBC AUTO: 95.1 FL (ref 80–99)
MONOCYTES # BLD: 2.2 K/UL (ref 0–1)
MONOCYTES NFR BLD: 15 % (ref 5–13)
NEUTS BAND NFR BLD MANUAL: 2 %
NEUTS SEG # BLD: 9.4 K/UL (ref 1.8–8)
NEUTS SEG NFR BLD: 62 % (ref 32–75)
NITRITE UR QL STRIP.AUTO: NEGATIVE
NRBC # BLD: 0 K/UL (ref 0–0.01)
NRBC BLD-RTO: 0 PER 100 WBC
P-R INTERVAL, ECG05: 110 MS
PH UR STRIP: 6.5 [PH] (ref 5–8)
PLATELET # BLD AUTO: 91 K/UL (ref 150–400)
PLATELET COMMENTS,PCOM: ABNORMAL
PMV BLD AUTO: 12.1 FL (ref 8.9–12.9)
POTASSIUM SERPL-SCNC: 3.6 MMOL/L (ref 3.5–5.1)
PROT SERPL-MCNC: 7.7 G/DL (ref 6.4–8.2)
PROT UR STRIP-MCNC: ABNORMAL MG/DL
PROTHROMBIN TIME: 11.4 SEC (ref 9–11.1)
Q-T INTERVAL, ECG07: 388 MS
QRS DURATION, ECG06: 76 MS
QTC CALCULATION (BEZET), ECG08: 412 MS
RBC # BLD AUTO: 3.91 M/UL (ref 4.1–5.7)
RBC #/AREA URNS HPF: ABNORMAL /HPF (ref 0–5)
RBC MORPH BLD: ABNORMAL
SODIUM SERPL-SCNC: 143 MMOL/L (ref 136–145)
SP GR UR REFRACTOMETRY: 1.01 (ref 1–1.03)
THERAPEUTIC RANGE,PTTT: NORMAL SECS (ref 58–77)
TROPONIN I SERPL-MCNC: <0.05 NG/ML
UA: UC IF INDICATED,UAUC: ABNORMAL
UROBILINOGEN UR QL STRIP.AUTO: 4 EU/DL (ref 0.2–1)
VENTRICULAR RATE, ECG03: 68 BPM
WBC # BLD AUTO: 14.7 K/UL (ref 4.1–11.1)
WBC URNS QL MICRO: >100 /HPF (ref 0–4)

## 2019-01-04 PROCEDURE — 83735 ASSAY OF MAGNESIUM: CPT

## 2019-01-04 PROCEDURE — 87804 INFLUENZA ASSAY W/OPTIC: CPT

## 2019-01-04 PROCEDURE — 93005 ELECTROCARDIOGRAM TRACING: CPT

## 2019-01-04 PROCEDURE — 71046 X-RAY EXAM CHEST 2 VIEWS: CPT

## 2019-01-04 PROCEDURE — 83605 ASSAY OF LACTIC ACID: CPT

## 2019-01-04 PROCEDURE — 74011250637 HC RX REV CODE- 250/637: Performed by: EMERGENCY MEDICINE

## 2019-01-04 PROCEDURE — 70450 CT HEAD/BRAIN W/O DYE: CPT

## 2019-01-04 PROCEDURE — 74011000258 HC RX REV CODE- 258: Performed by: EMERGENCY MEDICINE

## 2019-01-04 PROCEDURE — 99285 EMERGENCY DEPT VISIT HI MDM: CPT

## 2019-01-04 PROCEDURE — 81001 URINALYSIS AUTO W/SCOPE: CPT

## 2019-01-04 PROCEDURE — 87077 CULTURE AEROBIC IDENTIFY: CPT

## 2019-01-04 PROCEDURE — 94761 N-INVAS EAR/PLS OXIMETRY MLT: CPT

## 2019-01-04 PROCEDURE — 84484 ASSAY OF TROPONIN QUANT: CPT

## 2019-01-04 PROCEDURE — 65660000000 HC RM CCU STEPDOWN

## 2019-01-04 PROCEDURE — 96365 THER/PROPH/DIAG IV INF INIT: CPT

## 2019-01-04 PROCEDURE — 74011250637 HC RX REV CODE- 250/637: Performed by: INTERNAL MEDICINE

## 2019-01-04 PROCEDURE — 36415 COLL VENOUS BLD VENIPUNCTURE: CPT

## 2019-01-04 PROCEDURE — 83690 ASSAY OF LIPASE: CPT

## 2019-01-04 PROCEDURE — 85730 THROMBOPLASTIN TIME PARTIAL: CPT

## 2019-01-04 PROCEDURE — 96361 HYDRATE IV INFUSION ADD-ON: CPT

## 2019-01-04 PROCEDURE — 74011250636 HC RX REV CODE- 250/636: Performed by: INTERNAL MEDICINE

## 2019-01-04 PROCEDURE — 87186 SC STD MICRODIL/AGAR DIL: CPT

## 2019-01-04 PROCEDURE — 74011250636 HC RX REV CODE- 250/636: Performed by: EMERGENCY MEDICINE

## 2019-01-04 PROCEDURE — 74176 CT ABD & PELVIS W/O CONTRAST: CPT

## 2019-01-04 PROCEDURE — 85610 PROTHROMBIN TIME: CPT

## 2019-01-04 PROCEDURE — 85025 COMPLETE CBC W/AUTO DIFF WBC: CPT

## 2019-01-04 PROCEDURE — 87086 URINE CULTURE/COLONY COUNT: CPT

## 2019-01-04 PROCEDURE — 87040 BLOOD CULTURE FOR BACTERIA: CPT

## 2019-01-04 PROCEDURE — 80053 COMPREHEN METABOLIC PANEL: CPT

## 2019-01-04 RX ORDER — SODIUM CHLORIDE 0.9 % (FLUSH) 0.9 %
5-10 SYRINGE (ML) INJECTION AS NEEDED
Status: DISCONTINUED | OUTPATIENT
Start: 2019-01-04 | End: 2019-01-09 | Stop reason: HOSPADM

## 2019-01-04 RX ORDER — HEPARIN SODIUM 5000 [USP'U]/ML
5000 INJECTION, SOLUTION INTRAVENOUS; SUBCUTANEOUS EVERY 8 HOURS
Status: DISCONTINUED | OUTPATIENT
Start: 2019-01-04 | End: 2019-01-09 | Stop reason: HOSPADM

## 2019-01-04 RX ORDER — FACIAL-BODY WIPES
10 EACH TOPICAL
Status: DISCONTINUED | OUTPATIENT
Start: 2019-01-04 | End: 2019-01-09 | Stop reason: HOSPADM

## 2019-01-04 RX ORDER — OXCARBAZEPINE 300 MG/1
150 TABLET, FILM COATED ORAL 2 TIMES DAILY
Status: DISCONTINUED | OUTPATIENT
Start: 2019-01-04 | End: 2019-01-09 | Stop reason: HOSPADM

## 2019-01-04 RX ORDER — ONDANSETRON 2 MG/ML
4 INJECTION INTRAMUSCULAR; INTRAVENOUS
Status: DISCONTINUED | OUTPATIENT
Start: 2019-01-04 | End: 2019-01-09 | Stop reason: HOSPADM

## 2019-01-04 RX ORDER — LANOLIN ALCOHOL/MO/W.PET/CERES
325 CREAM (GRAM) TOPICAL 2 TIMES DAILY
Status: DISCONTINUED | OUTPATIENT
Start: 2019-01-04 | End: 2019-01-09 | Stop reason: HOSPADM

## 2019-01-04 RX ORDER — ASPIRIN 81 MG/1
81 TABLET ORAL DAILY
Status: DISCONTINUED | OUTPATIENT
Start: 2019-01-05 | End: 2019-01-09 | Stop reason: HOSPADM

## 2019-01-04 RX ORDER — BENZTROPINE MESYLATE 1 MG/1
1 TABLET ORAL EVERY 12 HOURS
Status: DISCONTINUED | OUTPATIENT
Start: 2019-01-04 | End: 2019-01-09 | Stop reason: HOSPADM

## 2019-01-04 RX ORDER — SODIUM CHLORIDE 0.9 % (FLUSH) 0.9 %
5-10 SYRINGE (ML) INJECTION EVERY 8 HOURS
Status: DISCONTINUED | OUTPATIENT
Start: 2019-01-04 | End: 2019-01-09 | Stop reason: HOSPADM

## 2019-01-04 RX ORDER — SODIUM CHLORIDE 9 MG/ML
50 INJECTION, SOLUTION INTRAVENOUS CONTINUOUS
Status: DISCONTINUED | OUTPATIENT
Start: 2019-01-04 | End: 2019-01-07

## 2019-01-04 RX ORDER — PRAVASTATIN SODIUM 40 MG/1
40 TABLET ORAL DAILY
Status: DISCONTINUED | OUTPATIENT
Start: 2019-01-05 | End: 2019-01-09 | Stop reason: HOSPADM

## 2019-01-04 RX ORDER — DIVALPROEX SODIUM 250 MG/1
1000 TABLET, EXTENDED RELEASE ORAL DAILY
Status: DISCONTINUED | OUTPATIENT
Start: 2019-01-05 | End: 2019-01-09 | Stop reason: HOSPADM

## 2019-01-04 RX ORDER — ACETAMINOPHEN 325 MG/1
650 TABLET ORAL
Status: COMPLETED | OUTPATIENT
Start: 2019-01-04 | End: 2019-01-04

## 2019-01-04 RX ORDER — DOCUSATE SODIUM 100 MG/1
100 CAPSULE, LIQUID FILLED ORAL DAILY
Status: DISCONTINUED | OUTPATIENT
Start: 2019-01-05 | End: 2019-01-09 | Stop reason: HOSPADM

## 2019-01-04 RX ORDER — TAMSULOSIN HYDROCHLORIDE 0.4 MG/1
0.4 CAPSULE ORAL DAILY
Status: DISCONTINUED | OUTPATIENT
Start: 2019-01-05 | End: 2019-01-09 | Stop reason: HOSPADM

## 2019-01-04 RX ORDER — ACETAMINOPHEN 325 MG/1
650 TABLET ORAL
Status: DISCONTINUED | OUTPATIENT
Start: 2019-01-04 | End: 2019-01-09 | Stop reason: HOSPADM

## 2019-01-04 RX ADMIN — HEPARIN SODIUM 5000 UNITS: 5000 INJECTION INTRAVENOUS; SUBCUTANEOUS at 21:30

## 2019-01-04 RX ADMIN — CEFTRIAXONE 1 G: 1 INJECTION, POWDER, FOR SOLUTION INTRAMUSCULAR; INTRAVENOUS at 18:55

## 2019-01-04 RX ADMIN — OXCARBAZEPINE 150 MG: 300 TABLET ORAL at 22:44

## 2019-01-04 RX ADMIN — Medication 10 ML: at 22:45

## 2019-01-04 RX ADMIN — Medication 10 ML: at 22:44

## 2019-01-04 RX ADMIN — SODIUM CHLORIDE 500 ML: 900 INJECTION, SOLUTION INTRAVENOUS at 17:03

## 2019-01-04 RX ADMIN — SODIUM CHLORIDE 125 ML/HR: 900 INJECTION, SOLUTION INTRAVENOUS at 23:06

## 2019-01-04 RX ADMIN — ACETAMINOPHEN 650 MG: 325 TABLET ORAL at 17:01

## 2019-01-04 RX ADMIN — ACETAMINOPHEN 650 MG: 325 TABLET ORAL at 21:38

## 2019-01-04 RX ADMIN — FERROUS SULFATE TAB 325 MG (65 MG ELEMENTAL FE) 325 MG: 325 (65 FE) TAB at 22:44

## 2019-01-04 RX ADMIN — BENZTROPINE MESYLATE 1 MG: 1 TABLET ORAL at 22:44

## 2019-01-04 RX ADMIN — Medication 10 ML: at 17:03

## 2019-01-04 NOTE — ED PROVIDER NOTES
EMERGENCY DEPARTMENT HISTORY AND PHYSICAL EXAM 
 
 
Date: 1/4/2019 Patient Name: Erlin Eng History of Presenting Illness Chief Complaint Patient presents with  Fever Pt arrives via EMS from adult day care with AMS and generalized weakness Per EMS pt with fever 100.6 tympanic History Provided By: EMS and RN 
 
HPI: Erlin Eng, 76 y.o. male with PMHx significant for stroke, HTN, DM and schizophrenia, presents via EMS to the ED with cc of a sudden onset fever (100.6F) since earlier today with associated confusion and generalized weakness. Pt was sent via EMS from an Adult Day Care for evaluation of his fever and generalized weakness. Pt's weakness is residual from his history of CVA. Pt is able to state his name and that he is in a hospital, but is unable to name the correct hospital and is not oriented to time. HPI and ROS are limited due to AMS. There are no other complaints, changes, or physical findings at this time. PCP: Edin Silva MD 
 
No current facility-administered medications on file prior to encounter. Current Outpatient Medications on File Prior to Encounter Medication Sig Dispense Refill  
 HYDROcodone-acetaminophen (NORCO) 5-325 mg per tablet Take 1 Tab by mouth every eight (8) hours as needed for Pain. Max Daily Amount: 3 Tabs. 20 Tab 0  
 naproxen (NAPROSYN) 500 mg tablet Take 1 Tab by mouth two (2) times daily (with meals). 30 Tab 0  
 aspirin delayed-release 81 mg tablet Take 81 mg by mouth daily.  pantoprazole (PROTONIX) 40 mg tablet Take 40 mg by mouth two (2) times a day.  lisinopril (PRINIVIL, ZESTRIL) 40 mg tablet Take 40 mg by mouth daily.  bisacodyl (DULCOLAX) 10 mg suppository Insert 10 mg into rectum daily as needed for Other (constipation). 15 Suppository 0  
 insulin glargine (LANTUS) 100 unit/mL injection 20 Units by SubCUTAneous route nightly.  Indications: TYPE 2 DIABETES MELLITUS 1 Vial 0  
  haloperidol (HALDOL) 5 mg tablet Take 5 mg by mouth every twelve (12) hours.  QUEtiapine (SEROQUEL) 100 mg tablet Take 50 mg by mouth daily.  QUEtiapine (SEROQUEL) 100 mg tablet Take 200 mg by mouth nightly.  benztropine (COGENTIN) 1 mg tablet Take 1 mg by mouth every twelve (12) hours.  lamoTRIgine (LAMICTAL) 25 mg tablet Take 50 mg by mouth two (2) times a day.  OXcarbazepine (TRILEPTAL) 150 mg tablet Take 150 mg by mouth two (2) times a day.  divalproex ER (DEPAKOTE ER) 500 mg ER tablet Take 2 Tabs by mouth daily. 90 Tab 0  
 tamsulosin (FLOMAX) 0.4 mg capsule Take 0.4 mg by mouth daily.  oxybutynin chloride XL (DITROPAN XL) 10 mg CR tablet Take 10 mg by mouth daily.  docusate sodium (COLACE) 100 mg capsule Take 100 mg by mouth daily.  amLODIPine (NORVASC) 10 mg tablet Take 10 mg by mouth daily.  ferrous sulfate 325 mg (65 mg iron) tablet Take 325 mg by mouth two (2) times a day.  multivitamin (ONE A DAY) tablet Take 1 Tab by mouth daily.  metoprolol (LOPRESSOR) 50 mg tablet Take 75 mg by mouth two (2) times a day.  metFORMIN (GLUCOPHAGE) 500 mg tablet Take 500 mg by mouth daily (with breakfast).  simvastatin (ZOCOR) 20 mg tablet Take 20 mg by mouth nightly. Past History Past Medical History: 
Past Medical History:  
Diagnosis Date  [facility]  Esophagitis   
 grade 4 - 2/2016  Gastrointestinal disorder GERD  
 GIB (gastrointestinal bleeding) 2/2016 required 2 U PRBC  Hypertension  Psychiatric disorder   
 schizophrenia  Seizures ([facility]  Stroke ([facility] Past Surgical History: 
History reviewed. No pertinent surgical history. Family History: 
History reviewed. No pertinent family history. Social History: 
Social History Tobacco Use  Smoking status: Current Every Day Smoker Packs/day: 1.00  Smokeless tobacco: Never Used Substance Use Topics  Alcohol use:  No  
 Comment: last drink 13 yrs ago  Drug use: No  
 
 
Allergies: Allergies Allergen Reactions  Thorazine [Chlorpromazine] Other (comments) \"burns head and throat\" Review of Systems Review of Systems Unable to perform ROS: Mental status change Physical Exam  
Physical Exam  
Constitutional: He is oriented to person, place, and time. He appears well-developed and well-nourished. No distress. HENT:  
Head: Normocephalic and atraumatic. Nose: Nose normal.  
Mouth/Throat: No oropharyngeal exudate. Eyes: Conjunctivae and EOM are normal. Pupils are equal, round, and reactive to light. Right eye exhibits discharge. Left eye exhibits no discharge. No scleral icterus. Neck: Normal range of motion. Neck supple. No JVD present. No thyromegaly present. Cardiovascular: Normal rate, regular rhythm, normal heart sounds, intact distal pulses and normal pulses. PMI is not displaced. Exam reveals no gallop and no friction rub. No murmur heard. Pulmonary/Chest: Effort normal and breath sounds normal. No stridor. No respiratory distress. He has no decreased breath sounds. He has no wheezes. He has no rhonchi. He has no rales. He exhibits no tenderness. Abdominal: Soft. Normal aorta and bowel sounds are normal. He exhibits no distension, no abdominal bruit and no mass. There is no hepatosplenomegaly. There is no tenderness. There is no rebound, no guarding and no CVA tenderness. No hernia. Neurological: He is alert and oriented to person, place, and time. He has normal reflexes. He is not disoriented. He displays no atrophy and no tremor. No cranial nerve deficit or sensory deficit. He exhibits abnormal muscle tone. He displays no seizure activity. GCS eye subscore is 4. GCS verbal subscore is 5. GCS motor subscore is 6. Reflex Scores: 
     Patellar reflexes are 2+ on the right side and 2+ on the left side. Globally slow with right side weakness from prior CVA A and O x 1  
 Skin: Skin is warm. No rash noted. He is not diaphoretic. No erythema. Nursing note and vitals reviewed. Diagnostic Study Results Labs - Recent Results (from the past 12 hour(s)) EKG, 12 LEAD, INITIAL Collection Time: 01/04/19  1:53 PM  
Result Value Ref Range Ventricular Rate 68 BPM  
 Atrial Rate 68 BPM  
 P-R Interval 110 ms QRS Duration 76 ms  
 Q-T Interval 388 ms QTC Calculation (Bezet) 412 ms Calculated P Axis 43 degrees Calculated R Axis 31 degrees Calculated T Axis 12 degrees Diagnosis Sinus rhythm with short [place] Nonspecific T wave abnormality When compared with ECG of 27-JUN-2017 00:18, No significant change was found Confirmed by Viki Chinchilla ([id number]) on 1/4/2019 3:07:17 PM 
  
CBC WITH AUTOMATED DIFF Collection Time: 01/04/19  2:43 PM  
Result Value Ref Range WBC 14.7 (H) 4.1 - 11.1 K/uL  
 RBC 3.91 (L) 4.10 - 5.70 M/uL  
 HGB 12.9 12.1 - 17.0 g/dL HCT 37.2 36.6 - 50.3 % MCV 95.1 80.0 - 99.0 FL  
 MCH 33.0 26.0 - 34.0 PG  
 MCHC 34.7 30.0 - 36.5 g/dL  
 RDW 12.1 11.5 - 14.5 % PLATELET 91 (L) 632 - 400 K/uL MPV 12.1 8.9 - 12.9 FL  
 NRBC 0.0 0  WBC ABSOLUTE NRBC 0.00 0.00 - 0.01 K/uL NEUTROPHILS 62 32 - 75 % BAND NEUTROPHILS 2 % LYMPHOCYTES 21 12 - 49 % MONOCYTES 15 (H) 5 - 13 % EOSINOPHILS 0 0 - 7 % BASOPHILS 0 0 - 1 % IMMATURE GRANULOCYTES 0 0.0 - 0.5 % ABS. NEUTROPHILS 9.4 (H) 1.8 - 8.0 K/UL  
 ABS. LYMPHOCYTES 3.1 0.8 - 3.5 K/UL  
 ABS. MONOCYTES 2.2 (H) 0.0 - 1.0 K/UL  
 ABS. EOSINOPHILS 0.0 0.0 - 0.4 K/UL  
 ABS. BASOPHILS 0.0 0.0 - 0.1 K/UL  
 ABS. IMM. GRANS. 0.0 0.00 - 0.04 K/UL  
 DF AUTOMATED PLATELET COMMENTS REACTIVE LYMPHS    
 RBC COMMENTS NORMOCYTIC, NORMOCHROMIC METABOLIC PANEL, COMPREHENSIVE Collection Time: 01/04/19  2:43 PM  
Result Value Ref Range Sodium 143 136 - 145 mmol/L Potassium 3.6 3.5 - 5.1 mmol/L  Chloride 105 97 - 108 mmol/L  
 CO2 32 21 - 32 mmol/L  
 Anion gap 6 5 - 15 mmol/L Glucose 107 (H) 65 - 100 mg/dL BUN 10 6 - 20 MG/DL Creatinine 1.10 0.70 - 1.30 MG/DL  
 BUN/Creatinine ratio 9 (L) 12 - 20 GFR est AA >60 >60 ml/min/1.73m2 GFR est non-AA >60 >60 ml/min/1.73m2 Calcium 8.6 8.5 - 10.1 MG/DL Bilirubin, total 0.7 0.2 - 1.0 MG/DL  
 ALT (SGPT) 23 12 - 78 U/L  
 AST (SGOT) 26 15 - 37 U/L Alk. phosphatase 84 45 - 117 U/L Protein, total 7.7 6.4 - 8.2 g/dL Albumin 3.2 (L) 3.5 - 5.0 g/dL Globulin 4.5 (H) 2.0 - 4.0 g/dL A-G Ratio 0.7 (L) 1.1 - 2.2 LACTIC ACID Collection Time: 01/04/19  2:43 PM  
Result Value Ref Range Lactic acid 1.8 0.4 - 2.0 MMOL/L  
MAGNESIUM Collection Time: 01/04/19  2:43 PM  
Result Value Ref Range Magnesium 1.6 1.6 - 2.4 mg/dL TROPONIN I Collection Time: 01/04/19  2:43 PM  
Result Value Ref Range Troponin-I, Qt. <0.05 <0.05 ng/mL LIPASE Collection Time: 01/04/19  2:43 PM  
Result Value Ref Range Lipase 89 73 - 393 U/L  
PTT Collection Time: 01/04/19  2:43 PM  
Result Value Ref Range aPTT 30.6 22.1 - 32.0 sec  
 aPTT, therapeutic range     58.0 - 77.0 SECS  
PROTHROMBIN TIME + INR Collection Time: 01/04/19  2:43 PM  
Result Value Ref Range INR 1.1 0.9 - 1.1 Prothrombin time 11.4 (H) 9.0 - 11.1 sec INFLUENZA A & B AG (RAPID TEST) Collection Time: 01/04/19  3:06 PM  
Result Value Ref Range Influenza A Antigen NEGATIVE  NEG Influenza B Antigen NEGATIVE  NEG    
URINALYSIS W/ REFLEX CULTURE Collection Time: 01/04/19  5:08 PM  
Result Value Ref Range Color YELLOW/STRAW Appearance CLOUDY (A) CLEAR Specific gravity 1.015 1.003 - 1.030    
 pH (UA) 6.5 5.0 - 8.0 Protein TRACE (A) NEG mg/dL Glucose NEGATIVE  NEG mg/dL Ketone TRACE (A) NEG mg/dL Bilirubin NEGATIVE  NEG Blood SMALL (A) NEG Urobilinogen 4.0 (H) 0.2 - 1.0 EU/dL Nitrites NEGATIVE  NEG  Leukocyte Esterase LARGE (A) NEG    
 WBC >100 (H) 0 - 4 /hpf  
 RBC 10-20 0 - 5 /hpf Epithelial cells FEW FEW /lpf Bacteria 4+ (A) NEG /hpf  
 UA:UC IF INDICATED URINE CULTURE ORDERED (A) CNI Hyaline cast 0-2 0 - 5 /lpf Radiologic Studies -  
CT ABD PELV WO CONT Final Result IMPRESSION:  
No acute abnormality CT HEAD WO CONT Final Result IMPRESSION:   
Volume loss, no acute abnormality XR CHEST PA LAT Final Result IMPRESSION: No acute cardiopulmonary disease. CT Results  (Last 48 hours) 01/04/19 1657  CT HEAD WO CONT Final result Impression:  IMPRESSION:   
Volume loss, no acute abnormality Narrative:  EXAM: CT HEAD WO CONT INDICATION: fever, AMS  
   
COMPARISON: 2/6/2017. CONTRAST: None. TECHNIQUE: Unenhanced CT of the head was performed using 5 mm images. Brain and  
bone windows were generated. CT dose reduction was achieved through use of a  
standardized protocol tailored for this examination and automatic exposure  
control for dose modulation. FINDINGS:  
Ventricles and cisterns are enlarged compatible with the overall degree of  
volume loss. Mild low density within the periventricular white matter. This is  
not significantly changed. There is no intracranial hemorrhage, extra-axial  
collection, mass, mass effect or midline shift. The basilar cisterns are open. No acute infarct is identified. The bone windows demonstrate no abnormalities. The visualized portions of the paranasal sinuses and mastoid air cells are  
clear. 01/04/19 1657  CT ABD PELV WO CONT Final result Impression:  IMPRESSION:  
No acute abnormality Narrative:  EXAM: CT ABD PELV WO CONT INDICATION: fever AMS  
   
COMPARISON: 1/29/2016 CONTRAST:  None. TECHNIQUE:   
Thin axial images were obtained through the abdomen and pelvis. Coronal and  
sagittal reconstructions were generated. Oral contrast was not administered.  CT  
 dose reduction was achieved through use of a standardized protocol tailored for  
this examination and automatic exposure control for dose modulation. The absence of intravenous contrast material reduces the sensitivity for  
evaluation of the solid parenchymal organs of the abdomen. FINDINGS:   
LUNG BASES: Clear. INCIDENTALLY IMAGED HEART AND MEDIASTINUM: Moderate size hiatal hernia LIVER: No mass or biliary dilatation. GALLBLADDER: Unremarkable. SPLEEN: No mass. PANCREAS: No mass or ductal dilatation. ADRENALS: Unremarkable. KIDNEYS/URETERS: No mass, calculus, or hydronephrosis. STOMACH: Unremarkable. SMALL BOWEL: No dilatation or wall thickening. COLON: No dilatation or wall thickening. APPENDIX: Unremarkable. PERITONEUM: No ascites or pneumoperitoneum. RETROPERITONEUM: No lymphadenopathy or aortic aneurysm. There are vascular  
calcifications REPRODUCTIVE ORGANS: Not enlarged URINARY BLADDER: Collapsed and cannot be evaluated BONES: No destructive bone lesion. ADDITIONAL COMMENTS: N/A  
   
  
  
 
CXR Results  (Last 48 hours) 01/04/19 1537  XR CHEST PA LAT Final result Impression:  IMPRESSION: No acute cardiopulmonary disease. Narrative: Indication:  fever AMS Exam: PA and lateral views of the chest.  
   
Direct comparison is made to prior CXR dated June 2017. Findings: Cardiomediastinal silhouette is within normal limits. Lungs are clear  
bilaterally. Pleural spaces are normal. Osseous structures are intact. Medical Decision Making I am the first provider for this patient. I reviewed the vital signs, available nursing notes, past medical history, past surgical history, family history and social history. Vital Signs-Reviewed the patient's vital signs. Patient Vitals for the past 12 hrs: 
 Temp Pulse Resp BP SpO2  
01/04/19 1607 (!) 101.5 °F (38.6 °C) 69 19 152/57 98 % 01/04/19 1446  71 29 159/69 99 % 01/04/19 1431  71 11 157/65   
01/04/19 1352 98.4 °F (36.9 °C) 67 14 144/72 100 % Pulse Oximetry Analysis - 100% on RA Cardiac Monitor:  
Rate: 67 bpm 
Rhythm: Normal Sinus Rhythm EKG interpretation: (Preliminary) 1353 Rhythm: sinus rhythm; and regular . Rate (approx.): 68; Axis: normal; DC interval: shortened; QRS interval: normal ; ST/T wave: non-specific T wave changes. Written by Fahad Romero, ED Scribe, as dictated by Manjula Fairchild MD. 
 
 
CRITICAL CARE NOTE : 
 
13:52 AM 
 
 
IMPENDING DETERIORATION -Cardiovascular, CNS, Metabolic and Renal 
 
ASSOCIATED RISK FACTORS - Hypotension, Metabolic changes, Dehydration and Vascular Compromise MANAGEMENT- Bedside Assessment and Supervision of Care INTERPRETATION -  Xrays, CT Scan, ECG and Blood Pressure INTERVENTIONS - hemodynamic mngmt and Metobolic interventions CASE REVIEW - Hospitalist, Nursing and Family TREATMENT RESPONSE -Stable PERFORMED BY - Self NOTES   : 
 
 
I have spent 40 minutes of critical care time involved in lab review, consultations with specialist, family decision- making, bedside attention and documentation. During this entire length of time I was immediately available to the patient . Lacey Purdy MD 
 
 
 
Records Reviewed: Nursing Notes and Old Medical Records Provider Notes (Medical Decision Making): DDx: viral syndrome, PNA, influenza, UTI, CVA, electrolyte abnormality Impression: History of stroke, poor historian, sent from adult day care for possible fever and confusion. Has had prior stroke. Will check labs, XR and make final disposition pending those results. ED Course:  
Initial assessment performed. The patients presenting problems have been discussed, and they are in agreement with the care plan formulated and outlined with them. I have encouraged them to ask questions as they arise throughout their visit. ED Course as of Jan 05 1151 Fri Jan 04, 2019 65 Pt is made aware of the plan for admission  [AS] ED Course User Index [AS] Andrae Mcdaniel PM 
Pt's caregiver is in the room at this time. She states he was not at adult  but was instead sent here from home. She was going to send him to adult  but was too concerned about his condition. She was unaware of his fever, which was acquired by EMS. Of note, when he was drinking she observed he had difficulty keeping water in his mouth. CONSULT NOTE:  
6:00 PM 
Leny Toscano MD spoke with Dr. Brianne Mosley, Specialty: Hospitalist 
Discussed pt's hx, disposition, and available diagnostic and imaging results. Reviewed care plans. Consultant will evaluate pt for admission. Written by Macho Yates, ED Scribe, as dictated by Leny Toscano MD. Critical Care Time:  
0 Disposition: 
Admit Note: 
6:01 PM 
Pt is being admitted by Dr. Beba Zuniga. The results of their tests and reason(s) for their admission have been discussed with pt and/or available family. They convey agreement and understanding for the need to be admitted and for admission diagnosis. PLAN: 
1. Admit to hospitalist 
Diagnosis Clinical Impression: 1. Urinary tract infection without hematuria, site unspecified 2. Confusion 3. Sepsis, due to unspecified organism ([facility] Attestations: This note is prepared by Karen Dover, acting as Scribe for Leny Toscano MD. Leny Toscano MD: The scribe's documentation has been prepared under my direction and personally reviewed by me in its entirety. I confirm that the note above accurately reflects all work, treatment, procedures, and medical decision making performed by me.

## 2019-01-04 NOTE — ED TRIAGE NOTES
Pt arrives via EMS From adult  for AMS and fever EMS reports pt alert oriented during transport and only c/o generalized weakness. Pt with hx of prior CVA with some residual weakness. Pt updated on plan with pending evaluation by MD.  
 
Pt arrives alert but confused. Pt able to state name and that he is in hospital but incorrect on which hospital Pt unsure of date/year.

## 2019-01-04 NOTE — ED NOTES
Medicated as ordered, IVF started. Pt voided 200cc, samples sent. Pt turned s/s, linens straightened and pt positioned for comfort.

## 2019-01-04 NOTE — ED NOTES
Pt turned s/s, perineal care done, placed on clean sheets and adult brief. Positioned up in bed for comfort   Urinal propped. Family/caregiver to bedside.

## 2019-01-04 NOTE — ED NOTES
Case discussed with Dr Chava Powell; temp now elevated to 101.5. Will call CODE purple, give tylenol, 500cc bolus.

## 2019-01-05 LAB
ANION GAP SERPL CALC-SCNC: 8 MMOL/L (ref 5–15)
BASOPHILS # BLD: 0 K/UL (ref 0–0.1)
BASOPHILS NFR BLD: 0 % (ref 0–1)
BUN SERPL-MCNC: 10 MG/DL (ref 6–20)
BUN/CREAT SERPL: 10 (ref 12–20)
CALCIUM SERPL-MCNC: 8.1 MG/DL (ref 8.5–10.1)
CHLORIDE SERPL-SCNC: 107 MMOL/L (ref 97–108)
CO2 SERPL-SCNC: 26 MMOL/L (ref 21–32)
CREAT SERPL-MCNC: 0.97 MG/DL (ref 0.7–1.3)
DIFFERENTIAL METHOD BLD: ABNORMAL
EOSINOPHIL # BLD: 0 K/UL (ref 0–0.4)
EOSINOPHIL NFR BLD: 0 % (ref 0–7)
ERYTHROCYTE [DISTWIDTH] IN BLOOD BY AUTOMATED COUNT: 12 % (ref 11.5–14.5)
GLUCOSE BLD STRIP.AUTO-MCNC: 104 MG/DL (ref 65–100)
GLUCOSE BLD STRIP.AUTO-MCNC: 114 MG/DL (ref 65–100)
GLUCOSE BLD STRIP.AUTO-MCNC: 154 MG/DL (ref 65–100)
GLUCOSE BLD STRIP.AUTO-MCNC: 181 MG/DL (ref 65–100)
GLUCOSE BLD STRIP.AUTO-MCNC: 72 MG/DL (ref 65–100)
GLUCOSE BLD STRIP.AUTO-MCNC: 79 MG/DL (ref 65–100)
GLUCOSE SERPL-MCNC: 159 MG/DL (ref 65–100)
HCT VFR BLD AUTO: 31.7 % (ref 36.6–50.3)
HGB BLD-MCNC: 11.2 G/DL (ref 12.1–17)
IMM GRANULOCYTES # BLD: 0.1 K/UL (ref 0–0.04)
IMM GRANULOCYTES NFR BLD AUTO: 1 % (ref 0–0.5)
LYMPHOCYTES # BLD: 2.4 K/UL (ref 0.8–3.5)
LYMPHOCYTES NFR BLD: 18 % (ref 12–49)
MCH RBC QN AUTO: 32.9 PG (ref 26–34)
MCHC RBC AUTO-ENTMCNC: 35.3 G/DL (ref 30–36.5)
MCV RBC AUTO: 93.2 FL (ref 80–99)
MONOCYTES # BLD: 1.9 K/UL (ref 0–1)
MONOCYTES NFR BLD: 15 % (ref 5–13)
NEUTS SEG # BLD: 8.8 K/UL (ref 1.8–8)
NEUTS SEG NFR BLD: 66 % (ref 32–75)
NRBC # BLD: 0 K/UL (ref 0–0.01)
NRBC BLD-RTO: 0 PER 100 WBC
PLATELET # BLD AUTO: 82 K/UL (ref 150–400)
PMV BLD AUTO: 12.7 FL (ref 8.9–12.9)
POTASSIUM SERPL-SCNC: 3.7 MMOL/L (ref 3.5–5.1)
RBC # BLD AUTO: 3.4 M/UL (ref 4.1–5.7)
SERVICE CMNT-IMP: ABNORMAL
SERVICE CMNT-IMP: NORMAL
SERVICE CMNT-IMP: NORMAL
SODIUM SERPL-SCNC: 141 MMOL/L (ref 136–145)
WBC # BLD AUTO: 13.3 K/UL (ref 4.1–11.1)

## 2019-01-05 PROCEDURE — 80048 BASIC METABOLIC PNL TOTAL CA: CPT

## 2019-01-05 PROCEDURE — 74011250636 HC RX REV CODE- 250/636: Performed by: INTERNAL MEDICINE

## 2019-01-05 PROCEDURE — 74011000258 HC RX REV CODE- 258: Performed by: HOSPITALIST

## 2019-01-05 PROCEDURE — 74011636637 HC RX REV CODE- 636/637: Performed by: INTERNAL MEDICINE

## 2019-01-05 PROCEDURE — 74011250637 HC RX REV CODE- 250/637: Performed by: GENERAL ACUTE CARE HOSPITAL

## 2019-01-05 PROCEDURE — 36415 COLL VENOUS BLD VENIPUNCTURE: CPT

## 2019-01-05 PROCEDURE — 85025 COMPLETE CBC W/AUTO DIFF WBC: CPT

## 2019-01-05 PROCEDURE — L3710 EO ELAS W/METAL JNTS PRE OTS: HCPCS

## 2019-01-05 PROCEDURE — 74011250636 HC RX REV CODE- 250/636: Performed by: HOSPITALIST

## 2019-01-05 PROCEDURE — 74011250636 HC RX REV CODE- 250/636: Performed by: GENERAL ACUTE CARE HOSPITAL

## 2019-01-05 PROCEDURE — 82962 GLUCOSE BLOOD TEST: CPT

## 2019-01-05 PROCEDURE — 74011250637 HC RX REV CODE- 250/637: Performed by: INTERNAL MEDICINE

## 2019-01-05 PROCEDURE — 65660000000 HC RM CCU STEPDOWN

## 2019-01-05 RX ORDER — DEXTROSE 50 % IN WATER (D50W) INTRAVENOUS SYRINGE
25-50 AS NEEDED
Status: DISCONTINUED | OUTPATIENT
Start: 2019-01-05 | End: 2019-01-09 | Stop reason: HOSPADM

## 2019-01-05 RX ORDER — HYDRALAZINE HYDROCHLORIDE 20 MG/ML
10 INJECTION INTRAMUSCULAR; INTRAVENOUS
Status: DISCONTINUED | OUTPATIENT
Start: 2019-01-05 | End: 2019-01-07

## 2019-01-05 RX ORDER — CLONIDINE 0.2 MG/24H
1 PATCH, EXTENDED RELEASE TRANSDERMAL
Status: DISCONTINUED | OUTPATIENT
Start: 2019-01-05 | End: 2019-01-09 | Stop reason: HOSPADM

## 2019-01-05 RX ORDER — QUETIAPINE FUMARATE 25 MG/1
50 TABLET, FILM COATED ORAL DAILY
Status: DISCONTINUED | OUTPATIENT
Start: 2019-01-05 | End: 2019-01-07

## 2019-01-05 RX ORDER — AMLODIPINE BESYLATE 5 MG/1
10 TABLET ORAL DAILY
Status: DISCONTINUED | OUTPATIENT
Start: 2019-01-05 | End: 2019-01-09 | Stop reason: HOSPADM

## 2019-01-05 RX ORDER — MAGNESIUM SULFATE 100 %
4 CRYSTALS MISCELLANEOUS AS NEEDED
Status: DISCONTINUED | OUTPATIENT
Start: 2019-01-05 | End: 2019-01-09 | Stop reason: HOSPADM

## 2019-01-05 RX ORDER — INSULIN LISPRO 100 [IU]/ML
INJECTION, SOLUTION INTRAVENOUS; SUBCUTANEOUS
Status: DISCONTINUED | OUTPATIENT
Start: 2019-01-05 | End: 2019-01-09 | Stop reason: HOSPADM

## 2019-01-05 RX ORDER — LANOLIN ALCOHOL/MO/W.PET/CERES
400 CREAM (GRAM) TOPICAL 2 TIMES DAILY
Status: COMPLETED | OUTPATIENT
Start: 2019-01-05 | End: 2019-01-05

## 2019-01-05 RX ORDER — QUETIAPINE FUMARATE 100 MG/1
200 TABLET, FILM COATED ORAL
Status: DISCONTINUED | OUTPATIENT
Start: 2019-01-05 | End: 2019-01-06

## 2019-01-05 RX ADMIN — ASPIRIN 81 MG: 81 TABLET, COATED ORAL at 08:53

## 2019-01-05 RX ADMIN — Medication 10 ML: at 13:24

## 2019-01-05 RX ADMIN — ACETAMINOPHEN 650 MG: 325 TABLET ORAL at 08:52

## 2019-01-05 RX ADMIN — Medication 10 ML: at 06:02

## 2019-01-05 RX ADMIN — DOCUSATE SODIUM 100 MG: 100 CAPSULE, LIQUID FILLED ORAL at 08:52

## 2019-01-05 RX ADMIN — HEPARIN SODIUM 5000 UNITS: 5000 INJECTION INTRAVENOUS; SUBCUTANEOUS at 03:46

## 2019-01-05 RX ADMIN — FERROUS SULFATE TAB 325 MG (65 MG ELEMENTAL FE) 325 MG: 325 (65 FE) TAB at 18:12

## 2019-01-05 RX ADMIN — Medication 400 MG: at 08:53

## 2019-01-05 RX ADMIN — Medication 10 ML: at 22:00

## 2019-01-05 RX ADMIN — HEPARIN SODIUM 5000 UNITS: 5000 INJECTION INTRAVENOUS; SUBCUTANEOUS at 13:24

## 2019-01-05 RX ADMIN — DIVALPROEX SODIUM 1000 MG: 250 TABLET, EXTENDED RELEASE ORAL at 08:53

## 2019-01-05 RX ADMIN — QUETIAPINE FUMARATE 200 MG: 100 TABLET ORAL at 22:00

## 2019-01-05 RX ADMIN — AMLODIPINE BESYLATE 10 MG: 5 TABLET ORAL at 13:24

## 2019-01-05 RX ADMIN — ACETAMINOPHEN 650 MG: 325 TABLET ORAL at 19:49

## 2019-01-05 RX ADMIN — FERROUS SULFATE TAB 325 MG (65 MG ELEMENTAL FE) 325 MG: 325 (65 FE) TAB at 08:53

## 2019-01-05 RX ADMIN — CEFTRIAXONE SODIUM 2 G: 2 INJECTION, POWDER, FOR SOLUTION INTRAMUSCULAR; INTRAVENOUS at 03:46

## 2019-01-05 RX ADMIN — TAMSULOSIN HYDROCHLORIDE 0.4 MG: 0.4 CAPSULE ORAL at 08:53

## 2019-01-05 RX ADMIN — INSULIN LISPRO 2 UNITS: 100 INJECTION, SOLUTION INTRAVENOUS; SUBCUTANEOUS at 08:56

## 2019-01-05 RX ADMIN — QUETIAPINE FUMARATE 50 MG: 25 TABLET ORAL at 08:53

## 2019-01-05 RX ADMIN — HEPARIN SODIUM 5000 UNITS: 5000 INJECTION INTRAVENOUS; SUBCUTANEOUS at 19:49

## 2019-01-05 RX ADMIN — PRAVASTATIN SODIUM 40 MG: 40 TABLET ORAL at 08:53

## 2019-01-05 RX ADMIN — BENZTROPINE MESYLATE 1 MG: 1 TABLET ORAL at 22:00

## 2019-01-05 RX ADMIN — BENZTROPINE MESYLATE 1 MG: 1 TABLET ORAL at 08:53

## 2019-01-05 RX ADMIN — SODIUM CHLORIDE 50 ML/HR: 900 INJECTION, SOLUTION INTRAVENOUS at 21:07

## 2019-01-05 RX ADMIN — OXCARBAZEPINE 150 MG: 300 TABLET ORAL at 08:52

## 2019-01-05 RX ADMIN — SODIUM CHLORIDE 125 ML/HR: 900 INJECTION, SOLUTION INTRAVENOUS at 08:42

## 2019-01-05 RX ADMIN — ACETAMINOPHEN 650 MG: 325 TABLET ORAL at 03:00

## 2019-01-05 RX ADMIN — Medication 400 MG: at 18:12

## 2019-01-05 RX ADMIN — OXCARBAZEPINE 150 MG: 300 TABLET ORAL at 18:12

## 2019-01-05 NOTE — PROGRESS NOTES
Hospitalist Progress Note NAME: Mary Lucio :  1950 MRN:  [id number] Assessment / Plan: 
Acute metabolic encephalopathy secondary to sepsis due to UTI 
-Continue stepdown monitoring 
-Continue IVF - decrease rate 
-WBC elevated, febrile overnight 
-Continue IV abx - agree with increased dose of 2g q24hrs 
-Follow Cx 
 
DM2 Hold metformin Start SSI with POCs 
  
Hx CVA Chronic R-sided deficits Cont asa, statin 
  
HTN 
SBP 140s-150s Holding amlodipine, metoprolol, lisinopril in setting of sepsis, monitor BP restart meds when clinically appropriate : 
-Will resume Norvasc. Continue holding Lisinopril and Metoprolol - likely to resume tmr if pt continues to improve 
  
Seizures Cont trileptal and depakote No witnessed seizures Some bladder incontinence but this is chronic 
  
Prior tobacco use Encourage cont abstinence 
  
Schizophrenia Will cont seroquel 
  
Urinary retention Cont flomax 25.0 - 29.9 Overweight / Body mass index is 28.89 kg/m². Code status: Full Prophylaxis: Heparin SQ Recommended Disposition: TBD Subjective: Chief Complaint / Reason for Physician Visit Drowsy, arousable, confused. Discussed with RN events overnight. Review of Systems: 
Symptom Y/N Comments  Symptom Y/N Comments Fever/Chills    Chest Pain Poor Appetite    Edema Cough    Abdominal Pain Sputum    Joint Pain SOB/HOLLINS    Pruritis/Rash Nausea/vomit    Tolerating PT/OT Diarrhea    Tolerating Diet Constipation    Other Could NOT obtain due to: confused Objective: VITALS:  
Last 24hrs VS reviewed since prior progress note. Most recent are: 
Patient Vitals for the past 24 hrs: 
 Temp Pulse Resp BP SpO2  
19 0707 99.5 °F (37.5 °C) 70 18 (!) 153/104 100 % 19 0640 98 °F (36.7 °C)      
19 0545 98.8 °F (37.1 °C)      
19 0447 (!) 101.2 °F (38.4 °C)     01/05/19 0355 (!) 103 °F (39.4 °C) 81 18 167/80 97 % 01/05/19 0326 (!) 101.5 °F (38.6 °C)      
01/05/19 0259  87  191/83 99 % 01/05/19 0245 (!) 103.1 °F (39.5 °C)      
01/05/19 0000 100 °F (37.8 °C)      
01/04/19 2253 (!) 101.7 °F (38.7 °C)      
01/04/19 2239 (!) 101.3 °F (38.5 °C) 80 16 182/70 97 % 01/04/19 2138 (!) 101.1 °F (38.4 °C) 78 14 188/82 99 % 01/04/19 2000  76 16 165/73 96 % 01/04/19 1900 99.9 °F (37.7 °C) 73 18 153/66 96 % 01/04/19 1800  78 15 152/61   
01/04/19 1706  73 12 164/59 97 % 01/04/19 1607 (!) 101.5 °F (38.6 °C) 69 19 152/57 98 % 01/04/19 1446  71 29 159/69 99 % 01/04/19 1431  71 11 157/65   
01/04/19 1352 98.4 °F (36.9 °C) 67 14 144/72 100 % Intake/Output Summary (Last 24 hours) at 1/5/2019 1049 Last data filed at 1/5/2019 0800 Gross per 24 hour Intake 1712.5 ml Output  Net 1712.5 ml PHYSICAL EXAM: 
General: Drowsy, arousable, confused EENT:  EOMI. Anicteric sclerae. MMM Resp:  CTA bilaterally, no wheezing or rales. No accessory muscle use CV:  Regular  rhythm,  No edema GI:  Soft, Non distended, Non tender.  +Bowel sounds Neurologic:  Alert and oriented X 0, Psych:   Deferred Skin:  No rashes. No jaundice Reviewed most current lab test results and cultures  YES Reviewed most current radiology test results   YES Review and summation of old records today    NO Reviewed patient's current orders and MAR    YES 
PMH/SH reviewed - no change compared to H&P 
________________________________________________________________________ Care Plan discussed with: 
  Comments Patient x Family RN x Care Manager Consultant Multidiciplinary team rounds were held today with , nursing, pharmacist and clinical coordinator. Patient's plan of care was discussed; medications were reviewed and discharge planning was addressed. ________________________________________________________________________ Total NON critical care TIME:  35   Minutes Total CRITICAL CARE TIME Spent:   Minutes non procedure based Comments >50% of visit spent in counseling and coordination of care    
________________________________________________________________________ Alicia Silvestre MD  
 
Procedures: see electronic medical records for all procedures/Xrays and details which were not copied into this note but were reviewed prior to creation of Plan. LABS: 
I reviewed today's most current labs and imaging studies. Pertinent labs include: 
Recent Labs 01/05/19 
0001 01/04/19 
1443 WBC 13.3* 14.7* HGB 11.2* 12.9 HCT 31.7* 37.2 PLT 82* 91* Recent Labs 01/05/19 
0001 01/04/19 
1443  143  
K 3.7 3.6  105 CO2 26 32 * 107* BUN 10 10 CREA 0.97 1.10 CA 8.1* 8.6 MG  --  1.6 ALB  --  3.2* TBILI  --  0.7 SGOT  --  26 ALT  --  23 INR  --  1.1 Signed: Alicia Silvestre MD

## 2019-01-05 NOTE — ED NOTES
Care assumed of patient @ this time & intro with rounding done, with report from ____Tangela [facility], RN_______________. Patient resting quietly on stretcher without verbal complaints.

## 2019-01-05 NOTE — ROUTINE PROCESS
TRANSFER - OUT REPORT: 
 
Verbal report given to [place], RN on [facility]  being transferred to [facility] [id number] for routine progression of care Report consisted of patients Situation, Background, Assessment and  
Recommendations(SBAR). Information from the following report(s) SBAR, Kardex, ED Summary and MAR was reviewed with the receiving nurse. Opportunity for questions and clarification was provided. Patient transported with: 
 Monitor Registered Nurse

## 2019-01-05 NOTE — PROGRESS NOTES
0715 : Report received from [place], RN. SBAR, Kardex, MAR and Recent Results were discussed. Gale Arreola RN 
 
0937 : Report given to Eitan Head, [address] [place] [address]. SBAR, Kardex, Intake/Output, MAR or Recent Results were discussed. Eitna Head RN assumed care of the pt.  
 
Gale Arreola RN

## 2019-01-05 NOTE — H&P
Hospitalist Admission NoteNAME: Jocy Miranda :  1950 MRN:  [id number] Date/Time:  2019 3:44 AM 
 
Patient PCP: Timi Berrios MD 
______________________________________________________________________ Given the patient's current clinical presentation, I have a high level of concern for decompensation if discharged from the emergency department. Complex decision making was performed, which includes reviewing the patient's available past medical records, laboratory results, and x-ray films. My assessment of this patient's clinical condition and my plan of care is as follows. Assessment / Plan: 
Acute metabolic encephalopathy Suspect 2/2 sepsis Treat infection and monitor mental status Sepsis 2/2 UTI Leukocytosis, fever DM2 Hold metformin Start SSI with POCs Hx CVA Chronic R-sided deficits Cont asa, statin HTN 
SBP 140s-150s Holding amlodipine, metoprolol, lisinopril in setting of sepsis, monitor BP restart meds when clinically appropriate Seizures Cont trileptal and depakote No witnessed seizures Prior tobacco use Encourage cont abstinence Schizophrenia Will cont seroquel Urinary retention Cont flomax Code Status: Full Surrogate Decision Maker: DVT Prophylaxis: sq heparin GI Prophylaxis: not indicated Baseline: resides at group home, assistance with ADLs by cousin who manages facility Subjective: CHIEF COMPLAINT: altered mental status HISTORY OF PRESENT ILLNESS:    
Mariah Ya is a 76 y.o. Male residing at area group home who was brought in to ED by EMS when found to have a fever of 100.6 earlier today at the residence with symps including confusion, gen weakness. Pt was oriented to self, hospital (not the specific location), family in room. Patient provides limited history and asks when he will be able to go home. He is pleasantly confused. Denies CP/abd pain/N/V.  Per family in ED, patient was in his normal state of health until today. We were asked to admit for work up and evaluation of the above problems. Past Medical History:  
Diagnosis Date  Diabetes ([facility]  Esophagitis   
 grade 4 - 2/2016  Gastrointestinal disorder GERD  
 GIB (gastrointestinal bleeding) 2/2016 required 2 U PRBC  Hypertension  Psychiatric disorder   
 schizophrenia  Seizures ([facility]  Stroke ([facility] History reviewed. No pertinent surgical history. Social History Tobacco Use  Smoking status: Current Every Day Smoker Packs/day: 1.00  Smokeless tobacco: Never Used Substance Use Topics  Alcohol use: No  
  Comment: last drink 13 yrs ago History reviewed. No pertinent family history. Allergies Allergen Reactions  Thorazine [Chlorpromazine] Other (comments) \"burns head and throat\" Prior to Admission medications Medication Sig Start Date End Date Taking? Authorizing Provider HYDROcodone-acetaminophen (NORCO) 5-325 mg per tablet Take 1 Tab by mouth every eight (8) hours as needed for Pain. Max Daily Amount: 3 Tabs. 6/27/17   Thaddeus Ramírez MD  
naproxen (NAPROSYN) 500 mg tablet Take 1 Tab by mouth two (2) times daily (with meals). 6/27/17   Thaddeus Ramírez MD  
aspirin delayed-release 81 mg tablet Take 81 mg by mouth daily. Provider, Historical  
pantoprazole (PROTONIX) 40 mg tablet Take 40 mg by mouth two (2) times a day. Provider, Historical  
lisinopril (PRINIVIL, ZESTRIL) 40 mg tablet Take 40 mg by mouth daily. Provider, Historical  
bisacodyl (DULCOLAX) 10 mg suppository Insert 10 mg into rectum daily as needed for Other (constipation). 2/2/16   Marti Ford MD  
insulin glargine (LANTUS) 100 unit/mL injection 20 Units by SubCUTAneous route nightly.  Indications: TYPE 2 DIABETES MELLITUS 2/2/16   Marah Lafleur MD  
haloperidol (HALDOL) 5 mg tablet Take 5 mg by mouth every twelve (12) hours.    Provider, Historical  
QUEtiapine (SEROQUEL) 100 mg tablet Take 50 mg by mouth daily. Provider, Historical  
QUEtiapine (SEROQUEL) 100 mg tablet Take 200 mg by mouth nightly. Provider, Historical  
benztropine (COGENTIN) 1 mg tablet Take 1 mg by mouth every twelve (12) hours. Provider, Historical  
lamoTRIgine (LAMICTAL) 25 mg tablet Take 50 mg by mouth two (2) times a day. Provider, Historical  
OXcarbazepine (TRILEPTAL) 150 mg tablet Take 150 mg by mouth two (2) times a day. Provider, Historical  
divalproex ER (DEPAKOTE ER) 500 mg ER tablet Take 2 Tabs by mouth daily. 5/13/14   Ivett Reeves MD  
tamsulosin (FLOMAX) 0.4 mg capsule Take 0.4 mg by mouth daily. Provider, Historical  
oxybutynin chloride XL (DITROPAN XL) 10 mg CR tablet Take 10 mg by mouth daily. Roxanne Reyes MD  
docusate sodium (COLACE) 100 mg capsule Take 100 mg by mouth daily. Roxanne Reyes MD  
amLODIPine (NORVASC) 10 mg tablet Take 10 mg by mouth daily. Roxanne Reyes MD  
ferrous sulfate 325 mg (65 mg iron) tablet Take 325 mg by mouth two (2) times a day. Roxanne Reyes MD  
multivitamin (ONE A DAY) tablet Take 1 Tab by mouth daily. Roxanne Reyes MD  
metoprolol (LOPRESSOR) 50 mg tablet Take 75 mg by mouth two (2) times a day. Roxanne Reyes MD  
metFORMIN (GLUCOPHAGE) 500 mg tablet Take 500 mg by mouth daily (with breakfast). Roxanne Reyes MD  
simvastatin (ZOCOR) 20 mg tablet Take 20 mg by mouth nightly. Roxanne Reyes MD  
 
 
REVIEW OF SYSTEMS:    
I am not able to complete the review of systems because: The patient is intubated and sedated The patient has altered mental status due to his acute medical problems The patient has baseline aphasia from prior stroke(s) The patient has baseline dementia and is not reliable historian The patient is in acute medical distress and unable to provide information AMS Total of 12 systems reviewed as follows: POSITIVE= underlined text  Negative = text not underlined General:  fever, chills, sweats, generalized weakness, weight loss/gain,  
   loss of appetite Eyes:    blurred vision, eye pain, loss of vision, double vision ENT:    rhinorrhea, pharyngitis Respiratory:   cough, sputum production, SOB, HOLLINS, wheezing, pleuritic pain  
Cardiology:   chest pain, palpitations, orthopnea, PND, edema, syncope Gastrointestinal:  abdominal pain , N/V, diarrhea, dysphagia, constipation, bleeding Genitourinary:  frequency, urgency, dysuria, hematuria, incontinence Muskuloskeletal :  arthralgia, myalgia, back pain Hematology:  easy bruising, nose or gum bleeding, lymphadenopathy Dermatological: rash, ulceration, pruritis, color change / jaundice Endocrine:   hot flashes or polydipsia Neurological:  headache, dizziness, confusion, focal weakness, paresthesia, Speech difficulties, memory loss, gait difficulty Psychological: Feelings of anxiety, depression, agitation Objective: VITALS:   
Visit Vitals /83 Pulse 87 Temp (!) 101.5 °F (38.6 °C) Resp 16 Ht 5' 8\" (1.727 m) Wt 86.2 kg (190 lb) SpO2 99% BMI 28.89 kg/m² PHYSICAL EXAM: 
 
General:    Alert, cooperative, no distress, appears stated age. HEENT: Atraumatic, anicteric sclerae, pink conjunctivae No oral ulcers, mucosa moist, throat clear, dentition fair Neck:  Supple, symmetrical,  thyroid: non tender Lungs:   Clear to auscultation bilaterally. No Wheezing or Rhonchi. No rales. Chest wall:  No tenderness  No Accessory muscle use. Heart:   Regular  rhythm,  No  murmur   No edema Abdomen:   Soft, non-tender. Not distended. Bowel sounds normal 
Extremities: No cyanosis. No clubbing,   
  Skin turgor normal, Capillary refill normal, Radial dial pulse 2+ Skin:     Not pale. Not Jaundiced  No rashes Psych:  Limited insight. Not depressed. Not anxious or agitated. Neurologic: EOMs intact. No facial asymmetry. No aphasia or slurred speech. RUE/RLE weakness, Sensation grossly intact. Alert and oriented to person, family in room, and fact he's in a hospital 
_______________________________________________________________________ Care Plan discussed with: 
  Comments Patient x Family  x   
RN x Care Manager Consultant:  x ED physician  
_______________________________________________________________________ Expected  Disposition:  
Home with Family HH/PT/OT/RN   
SNF/LTC x  
BANG   
________________________________________________________________________ TOTAL TIME:  >60 Minutes Critical Care Provided     Minutes non procedure based Comments Reviewed previous records  
>50% of visit spent in counseling and coordination of care  Discussion with patient and/or family and questions answered 
  
 
________________________________________________________________________ Signed: Katrina Andrews, DO 
 
Procedures: see electronic medical records for all procedures/Xrays and details which were not copied into this note but were reviewed prior to creation of Plan. LAB DATA REVIEWED:   
Recent Results (from the past 24 hour(s)) EKG, 12 LEAD, INITIAL Collection Time: 01/04/19  1:53 PM  
Result Value Ref Range Ventricular Rate 68 BPM  
 Atrial Rate 68 BPM  
 P-R Interval 110 ms QRS Duration 76 ms  
 Q-T Interval 388 ms QTC Calculation (Bezet) 412 ms Calculated P Axis 43 degrees Calculated R Axis 31 degrees Calculated T Axis 12 degrees Diagnosis Sinus rhythm with short WI Nonspecific T wave abnormality When compared with ECG of 27-JUN-2017 00:18, No significant change was found Confirmed by Sara Rodriguez ([id number]) on 1/4/2019 3:07:17 PM 
  
CBC WITH AUTOMATED DIFF Collection Time: 01/04/19  2:43 PM  
Result Value Ref Range WBC 14.7 (H) 4.1 - 11.1 K/uL  
 RBC 3.91 (L) 4.10 - 5.70 M/uL HGB 12.9 12.1 - 17.0 g/dL HCT 37.2 36.6 - 50.3 % MCV 95.1 80.0 - 99.0 FL  
 MCH 33.0 26.0 - 34.0 PG  
 MCHC 34.7 30.0 - 36.5 g/dL  
 RDW 12.1 11.5 - 14.5 % PLATELET 91 (L) 592 - 400 K/uL MPV 12.1 8.9 - 12.9 FL  
 NRBC 0.0 0  WBC ABSOLUTE NRBC 0.00 0.00 - 0.01 K/uL NEUTROPHILS 62 32 - 75 % BAND NEUTROPHILS 2 % LYMPHOCYTES 21 12 - 49 % MONOCYTES 15 (H) 5 - 13 % EOSINOPHILS 0 0 - 7 % BASOPHILS 0 0 - 1 % IMMATURE GRANULOCYTES 0 0.0 - 0.5 % ABS. NEUTROPHILS 9.4 (H) 1.8 - 8.0 K/UL  
 ABS. LYMPHOCYTES 3.1 0.8 - 3.5 K/UL  
 ABS. MONOCYTES 2.2 (H) 0.0 - 1.0 K/UL  
 ABS. EOSINOPHILS 0.0 0.0 - 0.4 K/UL  
 ABS. BASOPHILS 0.0 0.0 - 0.1 K/UL  
 ABS. IMM. GRANS. 0.0 0.00 - 0.04 K/UL  
 DF AUTOMATED PLATELET COMMENTS REACTIVE LYMPHS    
 RBC COMMENTS NORMOCYTIC, NORMOCHROMIC METABOLIC PANEL, COMPREHENSIVE Collection Time: 01/04/19  2:43 PM  
Result Value Ref Range Sodium 143 136 - 145 mmol/L Potassium 3.6 3.5 - 5.1 mmol/L Chloride 105 97 - 108 mmol/L  
 CO2 32 21 - 32 mmol/L Anion gap 6 5 - 15 mmol/L Glucose 107 (H) 65 - 100 mg/dL BUN 10 6 - 20 MG/DL Creatinine 1.10 0.70 - 1.30 MG/DL  
 BUN/Creatinine ratio 9 (L) 12 - 20 GFR est AA >60 >60 ml/min/1.73m2 GFR est non-AA >60 >60 ml/min/1.73m2 Calcium 8.6 8.5 - 10.1 MG/DL Bilirubin, total 0.7 0.2 - 1.0 MG/DL  
 ALT (SGPT) 23 12 - 78 U/L  
 AST (SGOT) 26 15 - 37 U/L Alk. phosphatase 84 45 - 117 U/L Protein, total 7.7 6.4 - 8.2 g/dL Albumin 3.2 (L) 3.5 - 5.0 g/dL Globulin 4.5 (H) 2.0 - 4.0 g/dL A-G Ratio 0.7 (L) 1.1 - 2.2 LACTIC ACID Collection Time: 01/04/19  2:43 PM  
Result Value Ref Range Lactic acid 1.8 0.4 - 2.0 MMOL/L  
MAGNESIUM Collection Time: 01/04/19  2:43 PM  
Result Value Ref Range Magnesium 1.6 1.6 - 2.4 mg/dL TROPONIN I Collection Time: 01/04/19  2:43 PM  
Result Value Ref Range Troponin-I, Qt. <0.05 <0.05 ng/mL LIPASE Collection Time: 01/04/19  2:43 PM  
Result Value Ref Range Lipase 89 73 - 393 U/L  
PTT Collection Time: 01/04/19  2:43 PM  
Result Value Ref Range aPTT 30.6 22.1 - 32.0 sec  
 aPTT, therapeutic range     58.0 - 77.0 SECS  
PROTHROMBIN TIME + INR Collection Time: 01/04/19  2:43 PM  
Result Value Ref Range INR 1.1 0.9 - 1.1 Prothrombin time 11.4 (H) 9.0 - 11.1 sec INFLUENZA A & B AG (RAPID TEST) Collection Time: 01/04/19  3:06 PM  
Result Value Ref Range Influenza A Antigen NEGATIVE  NEG Influenza B Antigen NEGATIVE  NEG    
URINALYSIS W/ REFLEX CULTURE Collection Time: 01/04/19  5:08 PM  
Result Value Ref Range Color YELLOW/STRAW Appearance CLOUDY (A) CLEAR Specific gravity 1.015 1.003 - 1.030    
 pH (UA) 6.5 5.0 - 8.0 Protein TRACE (A) NEG mg/dL Glucose NEGATIVE  NEG mg/dL Ketone TRACE (A) NEG mg/dL Bilirubin NEGATIVE  NEG Blood SMALL (A) NEG Urobilinogen 4.0 (H) 0.2 - 1.0 EU/dL Nitrites NEGATIVE  NEG Leukocyte Esterase LARGE (A) NEG    
 WBC >100 (H) 0 - 4 /hpf  
 RBC 10-20 0 - 5 /hpf Epithelial cells FEW FEW /lpf Bacteria 4+ (A) NEG /hpf  
 UA:UC IF INDICATED URINE CULTURE ORDERED (A) CNI Hyaline cast 0-2 0 - 5 /lpf  
CBC WITH AUTOMATED DIFF Collection Time: 01/05/19 12:01 AM  
Result Value Ref Range WBC 13.3 (H) 4.1 - 11.1 K/uL  
 RBC 3.40 (L) 4.10 - 5.70 M/uL  
 HGB 11.2 (L) 12.1 - 17.0 g/dL HCT 31.7 (L) 36.6 - 50.3 % MCV 93.2 80.0 - 99.0 FL  
 MCH 32.9 26.0 - 34.0 PG  
 MCHC 35.3 30.0 - 36.5 g/dL  
 RDW 12.0 11.5 - 14.5 % PLATELET 82 (L) 326 - 400 K/uL MPV 12.7 8.9 - 12.9 FL  
 NRBC 0.0 0  WBC ABSOLUTE NRBC 0.00 0.00 - 0.01 K/uL NEUTROPHILS 66 32 - 75 % LYMPHOCYTES 18 12 - 49 % MONOCYTES 15 (H) 5 - 13 % EOSINOPHILS 0 0 - 7 % BASOPHILS 0 0 - 1 % IMMATURE GRANULOCYTES 1 (H) 0.0 - 0.5 % ABS. NEUTROPHILS 8.8 (H) 1.8 - 8.0 K/UL  
 ABS. LYMPHOCYTES 2.4 0.8 - 3.5 K/UL ABS. MONOCYTES 1.9 (H) 0.0 - 1.0 K/UL  
 ABS. EOSINOPHILS 0.0 0.0 - 0.4 K/UL  
 ABS. BASOPHILS 0.0 0.0 - 0.1 K/UL  
 ABS. IMM. GRANS. 0.1 (H) 0.00 - 0.04 K/UL  
 DF AUTOMATED METABOLIC PANEL, BASIC Collection Time: 01/05/19 12:01 AM  
Result Value Ref Range Sodium 141 136 - 145 mmol/L Potassium 3.7 3.5 - 5.1 mmol/L Chloride 107 97 - 108 mmol/L  
 CO2 26 21 - 32 mmol/L Anion gap 8 5 - 15 mmol/L Glucose 159 (H) 65 - 100 mg/dL BUN 10 6 - 20 MG/DL Creatinine 0.97 0.70 - 1.30 MG/DL  
 BUN/Creatinine ratio 10 (L) 12 - 20 GFR est AA >60 >60 ml/min/1.73m2 GFR est non-AA >60 >60 ml/min/1.73m2  Calcium 8.1 (L) 8.5 - 10.1 MG/DL

## 2019-01-05 NOTE — PROGRESS NOTES
Patient arrived to the unit at 2230, patient moved over to bed Vitals obtain connected to tele monitor- Patient cleaned and brief changed foam place for preventive purposes. Patient temp 101.3 Recheck 101.7 despite tylenol given in ED, Spoke with Dr. Osullivan Friday who Admitted patient made aware of patient fever no change in orders only to continue to monitor patient temp at this time. Also questioned about whether fluids needed to be started due to infection and WBCs Dr. Osullivan Friday will review what fluids have been given thus far and placed orders accordingly 0000: Patient temp 100.0 Oral patient resting in bed all needs met  
0245: Patient temp 103.1 Oral Ice packs placed in groin cold wash cloths placed on back of neck and forehead, Tylenol given per order. /83 HR 87 Message sent to MD to make aware of patient condition-- Dr. Flor Miner called regarding message informed me of changes that were made 0330: Cooling blanket placed on patient with rectal thermometer in place Rectal temp 103.0 when blanket place Auto function used for cooling machine 0545: Temp 98.8 Rectally Cooling blanket cut off and removed 0005: Temp 98.0 Oral  
Patient placed on turn team  
0700: Bedside report given to [facility] Primary Nurse Wellington Damico, IVANNA and Karrie Yuan RN performed a dual skin assessment on this patient No impairment noted Buster score is 11

## 2019-01-05 NOTE — PROGRESS NOTES
**Consult Information** 
Member Facility: [id number] [facility] Facility MRN: [id number] Consult ID: [id number] Facility Time Zone: ET 
Date and Time of Consult: 01/05/2019 03:14:50 AM 
Requesting Clinician: Eliu Lua Time of Call : 01/05/2019 03:36:00 AM 
Patient Name: Colin Galvez YOB: 1950 Gender: Male **Clinical Note** Clinical Note: Patient is admitted with suspected UTI. Patient is running fever since admission. Ordered cooling blanket; patient had tylenol. He is on IV Rocephin. Will increase the dose of Rocephin from 1 g to  2 g every 24 hours. Continue IV hydration. As needed IV hydralazine for accelerated hypertension. Consider changing ROcephin to Meropenem if no improvement in fever. Follow culture results.

## 2019-01-06 LAB
ALBUMIN SERPL-MCNC: 2.3 G/DL (ref 3.5–5)
ALBUMIN/GLOB SERPL: 0.5 {RATIO} (ref 1.1–2.2)
ALP SERPL-CCNC: 67 U/L (ref 45–117)
ALT SERPL-CCNC: 14 U/L (ref 12–78)
ANION GAP SERPL CALC-SCNC: 8 MMOL/L (ref 5–15)
AST SERPL-CCNC: 20 U/L (ref 15–37)
BACTERIA SPEC CULT: ABNORMAL
BASOPHILS # BLD: 0 K/UL (ref 0–0.1)
BASOPHILS NFR BLD: 0 % (ref 0–1)
BILIRUB SERPL-MCNC: 0.4 MG/DL (ref 0.2–1)
BUN SERPL-MCNC: 9 MG/DL (ref 6–20)
BUN/CREAT SERPL: 10 (ref 12–20)
CALCIUM SERPL-MCNC: 7.7 MG/DL (ref 8.5–10.1)
CC UR VC: ABNORMAL
CHLORIDE SERPL-SCNC: 106 MMOL/L (ref 97–108)
CO2 SERPL-SCNC: 26 MMOL/L (ref 21–32)
CREAT SERPL-MCNC: 0.93 MG/DL (ref 0.7–1.3)
DIFFERENTIAL METHOD BLD: ABNORMAL
EOSINOPHIL # BLD: 0.2 K/UL (ref 0–0.4)
EOSINOPHIL NFR BLD: 2 % (ref 0–7)
ERYTHROCYTE [DISTWIDTH] IN BLOOD BY AUTOMATED COUNT: 11.9 % (ref 11.5–14.5)
GLOBULIN SER CALC-MCNC: 4.2 G/DL (ref 2–4)
GLUCOSE BLD STRIP.AUTO-MCNC: 110 MG/DL (ref 65–100)
GLUCOSE BLD STRIP.AUTO-MCNC: 111 MG/DL (ref 65–100)
GLUCOSE BLD STRIP.AUTO-MCNC: 144 MG/DL (ref 65–100)
GLUCOSE BLD STRIP.AUTO-MCNC: 214 MG/DL (ref 65–100)
GLUCOSE BLD STRIP.AUTO-MCNC: 83 MG/DL (ref 65–100)
GLUCOSE SERPL-MCNC: 140 MG/DL (ref 65–100)
HCT VFR BLD AUTO: 31.7 % (ref 36.6–50.3)
HGB BLD-MCNC: 11.2 G/DL (ref 12.1–17)
IMM GRANULOCYTES # BLD: 0.1 K/UL (ref 0–0.04)
IMM GRANULOCYTES NFR BLD AUTO: 1 % (ref 0–0.5)
LYMPHOCYTES # BLD: 2.4 K/UL (ref 0.8–3.5)
LYMPHOCYTES NFR BLD: 21 % (ref 12–49)
MCH RBC QN AUTO: 32.7 PG (ref 26–34)
MCHC RBC AUTO-ENTMCNC: 35.3 G/DL (ref 30–36.5)
MCV RBC AUTO: 92.7 FL (ref 80–99)
MONOCYTES # BLD: 1.2 K/UL (ref 0–1)
MONOCYTES NFR BLD: 11 % (ref 5–13)
NEUTS SEG # BLD: 7.4 K/UL (ref 1.8–8)
NEUTS SEG NFR BLD: 66 % (ref 32–75)
NRBC # BLD: 0 K/UL (ref 0–0.01)
NRBC BLD-RTO: 0 PER 100 WBC
PLATELET # BLD AUTO: 72 K/UL (ref 150–400)
POTASSIUM SERPL-SCNC: 3.3 MMOL/L (ref 3.5–5.1)
PROT SERPL-MCNC: 6.5 G/DL (ref 6.4–8.2)
RBC # BLD AUTO: 3.42 M/UL (ref 4.1–5.7)
SERVICE CMNT-IMP: ABNORMAL
SERVICE CMNT-IMP: NORMAL
SODIUM SERPL-SCNC: 140 MMOL/L (ref 136–145)
WBC # BLD AUTO: 11.2 K/UL (ref 4.1–11.1)

## 2019-01-06 PROCEDURE — 65660000000 HC RM CCU STEPDOWN

## 2019-01-06 PROCEDURE — 36415 COLL VENOUS BLD VENIPUNCTURE: CPT

## 2019-01-06 PROCEDURE — 74011250636 HC RX REV CODE- 250/636: Performed by: HOSPITALIST

## 2019-01-06 PROCEDURE — 74011250637 HC RX REV CODE- 250/637: Performed by: GENERAL ACUTE CARE HOSPITAL

## 2019-01-06 PROCEDURE — 85025 COMPLETE CBC W/AUTO DIFF WBC: CPT

## 2019-01-06 PROCEDURE — 74011250636 HC RX REV CODE- 250/636: Performed by: INTERNAL MEDICINE

## 2019-01-06 PROCEDURE — 74011250637 HC RX REV CODE- 250/637: Performed by: INTERNAL MEDICINE

## 2019-01-06 PROCEDURE — 74011000258 HC RX REV CODE- 258: Performed by: HOSPITALIST

## 2019-01-06 PROCEDURE — 74011250636 HC RX REV CODE- 250/636: Performed by: GENERAL ACUTE CARE HOSPITAL

## 2019-01-06 PROCEDURE — 74011636637 HC RX REV CODE- 636/637: Performed by: INTERNAL MEDICINE

## 2019-01-06 PROCEDURE — 82962 GLUCOSE BLOOD TEST: CPT

## 2019-01-06 PROCEDURE — 80053 COMPREHEN METABOLIC PANEL: CPT

## 2019-01-06 PROCEDURE — 51798 US URINE CAPACITY MEASURE: CPT

## 2019-01-06 RX ORDER — LISINOPRIL 20 MG/1
40 TABLET ORAL DAILY
Status: DISCONTINUED | OUTPATIENT
Start: 2019-01-07 | End: 2019-01-09 | Stop reason: HOSPADM

## 2019-01-06 RX ORDER — QUETIAPINE FUMARATE 25 MG/1
50 TABLET, FILM COATED ORAL
Status: DISCONTINUED | OUTPATIENT
Start: 2019-01-06 | End: 2019-01-07

## 2019-01-06 RX ORDER — POTASSIUM CHLORIDE 7.45 MG/ML
10 INJECTION INTRAVENOUS
Status: COMPLETED | OUTPATIENT
Start: 2019-01-06 | End: 2019-01-06

## 2019-01-06 RX ADMIN — BENZTROPINE MESYLATE 1 MG: 1 TABLET ORAL at 08:33

## 2019-01-06 RX ADMIN — PRAVASTATIN SODIUM 40 MG: 40 TABLET ORAL at 08:33

## 2019-01-06 RX ADMIN — CEFTRIAXONE SODIUM 2 G: 2 INJECTION, POWDER, FOR SOLUTION INTRAMUSCULAR; INTRAVENOUS at 03:29

## 2019-01-06 RX ADMIN — SODIUM CHLORIDE 50 ML/HR: 900 INJECTION, SOLUTION INTRAVENOUS at 19:07

## 2019-01-06 RX ADMIN — DIVALPROEX SODIUM 1000 MG: 250 TABLET, EXTENDED RELEASE ORAL at 08:31

## 2019-01-06 RX ADMIN — POTASSIUM CHLORIDE 10 MEQ: 10 INJECTION, SOLUTION INTRAVENOUS at 10:40

## 2019-01-06 RX ADMIN — DOCUSATE SODIUM 100 MG: 100 CAPSULE, LIQUID FILLED ORAL at 08:33

## 2019-01-06 RX ADMIN — FERROUS SULFATE TAB 325 MG (65 MG ELEMENTAL FE) 325 MG: 325 (65 FE) TAB at 17:15

## 2019-01-06 RX ADMIN — HEPARIN SODIUM 5000 UNITS: 5000 INJECTION INTRAVENOUS; SUBCUTANEOUS at 20:34

## 2019-01-06 RX ADMIN — FERROUS SULFATE TAB 325 MG (65 MG ELEMENTAL FE) 325 MG: 325 (65 FE) TAB at 08:32

## 2019-01-06 RX ADMIN — POTASSIUM CHLORIDE 10 MEQ: 10 INJECTION, SOLUTION INTRAVENOUS at 11:39

## 2019-01-06 RX ADMIN — HEPARIN SODIUM 5000 UNITS: 5000 INJECTION INTRAVENOUS; SUBCUTANEOUS at 03:29

## 2019-01-06 RX ADMIN — Medication 10 ML: at 00:38

## 2019-01-06 RX ADMIN — OXCARBAZEPINE 300 MG: 300 TABLET ORAL at 17:15

## 2019-01-06 RX ADMIN — HEPARIN SODIUM 5000 UNITS: 5000 INJECTION INTRAVENOUS; SUBCUTANEOUS at 13:11

## 2019-01-06 RX ADMIN — QUETIAPINE FUMARATE 50 MG: 25 TABLET ORAL at 08:31

## 2019-01-06 RX ADMIN — AMLODIPINE BESYLATE 10 MG: 5 TABLET ORAL at 08:33

## 2019-01-06 RX ADMIN — POTASSIUM CHLORIDE 10 MEQ: 10 INJECTION, SOLUTION INTRAVENOUS at 08:29

## 2019-01-06 RX ADMIN — OXCARBAZEPINE 150 MG: 300 TABLET ORAL at 08:32

## 2019-01-06 RX ADMIN — POTASSIUM CHLORIDE 10 MEQ: 10 INJECTION, SOLUTION INTRAVENOUS at 13:14

## 2019-01-06 RX ADMIN — BENZTROPINE MESYLATE 1 MG: 1 TABLET ORAL at 20:34

## 2019-01-06 RX ADMIN — INSULIN LISPRO 2 UNITS: 100 INJECTION, SOLUTION INTRAVENOUS; SUBCUTANEOUS at 13:26

## 2019-01-06 RX ADMIN — ASPIRIN 81 MG: 81 TABLET, COATED ORAL at 08:32

## 2019-01-06 RX ADMIN — INSULIN LISPRO 2 UNITS: 100 INJECTION, SOLUTION INTRAVENOUS; SUBCUTANEOUS at 20:41

## 2019-01-06 RX ADMIN — Medication 10 ML: at 03:29

## 2019-01-06 RX ADMIN — TAMSULOSIN HYDROCHLORIDE 0.4 MG: 0.4 CAPSULE ORAL at 08:33

## 2019-01-06 NOTE — ROUTINE PROCESS
Bedside and Verbal shift change report given to Hermelindo Huerta RN (oncoming nurse) by Estela Pineda (offgoing nurse). Report given with SBAR, Kardex, Intake/Output, MAR and Recent Results.

## 2019-01-06 NOTE — PROGRESS NOTES
PCU SHIFT NURSING NOTE Bedside shift change report given to [facility] (oncoming nurse) by Alton Espino (offgoing nurse). Report included the following information SBAR, Intake/Output, MAR, Accordion and Cardiac Rhythm NSR. Shift Summary: 2218 Bedside report given vitals obtained assessment completed, patient temp 100.4 PRN tylenol given, patient voided in urinal with assistance Recieved call from [address] [place] [address] and caregiver Radha Gonzalez- states patients base line is alert to self, is able to ambulate with assistance at home Caregiver Dave Johnson came to visited was able to answer admission database on behalf of patient Admission Date 1/4/2019 Admission Diagnosis Acute metabolic encephalopathy UTI (urinary tract infection) Consults IP CONSULT TO HOSPITALIST Consults []PT []OT []Speech  
[]Case Management  
  
[] Palliative Cardiac Monitoring Order []Yes []No  
 
IV drips []Yes Drip:                            Dose: 
Drip:                            Dose: 
Drip:                            Dose:  
[]No  
 
GI Prophylaxis []Yes []No  
 
 
 
DVT Prophylaxis SCDs:     
     
 Gutierrez stockings:     
  
[] Medication []Contraindicated []None Activity Level Activity Level: Bed Rest   
 Activity Assistance: Partial (two people) Purposeful Rounding every 1-2 hour? []Yes Wilson Score  Total Score: 3 Bed Alarm (If score 3 or >) []Yes  
[] Refused (See signed refusal form in chart) Buster Score  Buster Score: 12 Buster Score (if score 14 or less) []PMT consult  
[]Wound Care consult []Specialty bed  
[] Nutrition consult Needs prior to discharge:  
Home O2 required:   
[]Yes []No  
 If yes, how much O2 required? Other:  
 Last Bowel Movement:    
  
Influenza Vaccine Pneumonia Vaccine Diet Active Orders Diet DIET CARDIAC Regular LDAs Peripheral IV 01/04/19 Left Antecubital (Active) Site Assessment Clean, dry, & intact 1/5/2019  4:00 PM  
Phlebitis Assessment 0 1/5/2019  4:00 PM  
Infiltration Assessment 0 1/5/2019  4:00 PM  
Dressing Status Clean, dry, & intact 1/5/2019  4:00 PM  
Dressing Type Tape;Transparent 1/5/2019  4:00 PM  
Hub Color/Line Status Pink; Infusing 1/5/2019  4:00 PM  
                  
Urinary Catheter Intake & Output Date 01/04/19 1900 - 01/05/19 0659 01/05/19 0700 - 01/06/19 2916 Shift 5520-8056 24 Hour Total 5285-6896 5151-1122 24 Hour Total  
INTAKE  
P.O.   120  120  
  P. O.   120  120  
I. V.(mL/kg/hr) 50 550 1162.5(1.1)  1162.5 Volume (sodium chloride 0.9 % bolus infusion 500 mL)  500 Volume (0.9% sodium chloride infusion)   1112.5  1112.5 Volume (cefTRIAXone (ROCEPHIN) 1 g in 0.9% sodium chloride (MBP/ADV) 50 mL MBP) 50 50 Volume (cefTRIAXone (ROCEPHIN) 2 g in 0.9% sodium chloride (MBP/ADV) 50 mL)   50  50 Shift Total(mL/kg) 50(0.6) 550(6.4) 1282. 5(14.9)  1282. 5(14.9) OUTPUT Urine(mL/kg/hr)   300(0.3)  300 Urine Voided   300  300 Urine Occurrence(s) 2 x 2 x 2 x  2 x Shift Total(mL/kg)   300(3.5)  300(3.5) NET 50 550 982.5  982.5 Weight (kg) 86.2 86.2 86.2 86.2 86.2 Readmission Risk Assessment Tool Score Medium Risk 15 Total Score 3 Has Seen PCP in Last 6 Months (Yes=3, No=0)  
 4 Pt. Coverage (Medicare=5 , Medicaid, or Self-Pay=4) 6 Charlson Comorbidity Score (Age + Comorbid Conditions) Criteria that do not apply:  
 . Living with Significant Other. Assisted Living. LTAC. SNF. or  
Rehab Patient Length of Stay (>5 days = 3) IP Visits Last 12 Months (1-3=4, 4=9, >4=11) Expected Length of Stay 4d 19h Actual Length of Stay 1

## 2019-01-06 NOTE — PROGRESS NOTES
Hospitalist Progress Note NAME: Leonela Osborn :  1950 MRN:  [id number] Assessment / Plan: 
Acute metabolic encephalopathy secondary to sepsis due to UTI - improving 
-Continue stepdown monitoring 
-Continue IVF - decrease rate 
-WBC elevated, febrile overnight 
-Continue IV abx - agree with increased dose of 2g q24hrs 
-Follow Cx 
 
: 
Pt more alert and talkative today - still remains confused. Unclear baseline. Spoke with RN who states family have said pt is confused most of the time and is constantly talking about [place] where he grew up. So pt likely has underlying dementia. WBC improving, afebrile overnight, continue IV abx. UCx > 100k GNR. Hypokalemia K 3.3, repleted DM2 Hold metformin Start SSI with POCs 
  
Hx CVA Chronic R-sided deficits Cont asa, statin 
  
HTN 
SBP 140s-150s Holding amlodipine, metoprolol, lisinopril in setting of sepsis, monitor BP restart meds when clinically appropriate : 
-Will resume Norvasc. Continue holding Lisinopril and Metoprolol - likely to resume tmr if pt continues to improve : 
Resumed Lisinopril. 
  
Seizures Cont trileptal and depakote No witnessed seizures Some bladder incontinence but this is chronic 
  
Prior tobacco use Encourage cont abstinence 
  
Schizophrenia Will cont seroquel 
  
Urinary retention Cont flomax 25.0 - 29.9 Overweight / Body mass index is 28.89 kg/m². Code status: Full Prophylaxis: Heparin SQ Recommended Disposition: TBD Subjective: Chief Complaint / Reason for Physician Visit Awake, confused. Discussed with RN events overnight. Review of Systems: 
Symptom Y/N Comments  Symptom Y/N Comments Fever/Chills    Chest Pain Poor Appetite    Edema Cough    Abdominal Pain Sputum    Joint Pain SOB/HOLLINS    Pruritis/Rash Nausea/vomit    Tolerating PT/OT Diarrhea    Tolerating Diet Constipation    Other Could NOT obtain due to: confused Objective: VITALS:  
Last 24hrs VS reviewed since prior progress note. Most recent are: 
Patient Vitals for the past 24 hrs: 
 Temp Pulse Resp BP SpO2  
01/06/19 0734 99.4 °F (37.4 °C) 71 18 158/76 100 % 01/06/19 0247 98.1 °F (36.7 °C) 69 18 129/65 100 % 01/06/19 0019 99.1 °F (37.3 °C) 80 18 132/60 99 % 01/1950 100.4 °F (38 °C) 76 18 145/62 99 % 01/05/19 1558 98 °F (36.7 °C) 73 18 159/84 99 % Intake/Output Summary (Last 24 hours) at 1/6/2019 1245 Last data filed at 1/5/2019 1950 Gross per 24 hour Intake 120 ml Output 550 ml Net -430 ml PHYSICAL EXAM: 
General: Drowsy, arousable, confused EENT:  EOMI. Anicteric sclerae. MMM Resp:  CTA bilaterally, no wheezing or rales. No accessory muscle use CV:  Regular  rhythm,  No edema GI:  Soft, Non distended, Non tender.  +Bowel sounds Neurologic:  Alert and oriented X 1 person Psych:   Deferred Skin:  No rashes. No jaundice Reviewed most current lab test results and cultures  YES Reviewed most current radiology test results   YES Review and summation of old records today    NO Reviewed patient's current orders and MAR    YES 
PMH/ reviewed - no change compared to H&P 
________________________________________________________________________ Care Plan discussed with: 
  Comments Patient x Family RN x Care Manager Consultant Multidiciplinary team rounds were held today with , nursing, pharmacist and clinical coordinator. Patient's plan of care was discussed; medications were reviewed and discharge planning was addressed. ________________________________________________________________________ Total NON critical care TIME:  25   Minutes Total CRITICAL CARE TIME Spent:   Minutes non procedure based Comments >50% of visit spent in counseling and coordination of care    
________________________________________________________________________ Bear Call MD  
 
Procedures: see electronic medical records for all procedures/Xrays and details which were not copied into this note but were reviewed prior to creation of Plan. LABS: 
I reviewed today's most current labs and imaging studies. Pertinent labs include: 
Recent Labs 01/06/19 0334 01/05/19 
0001 01/04/19 
1443 WBC 11.2* 13.3* 14.7* HGB 11.2* 11.2* 12.9 HCT 31.7* 31.7* 37.2 PLT 72* 82* 91* Recent Labs 01/06/19 0334 01/05/19 
0001 01/04/19 
1443  141 143  
K 3.3* 3.7 3.6  107 105 CO2 26 26 32 * 159* 107* BUN 9 10 10 CREA 0.93 0.97 1.10 CA 7.7* 8.1* 8.6 MG  --   --  1.6 ALB 2.3*  --  3.2* TBILI 0.4  --  0.7 SGOT 20  --  26 ALT 14  --  23 INR  --   --  1.1 Signed: Bear Call MD

## 2019-01-07 LAB
ALBUMIN SERPL-MCNC: 2.1 G/DL (ref 3.5–5)
ALBUMIN/GLOB SERPL: 0.5 {RATIO} (ref 1.1–2.2)
ALP SERPL-CCNC: 66 U/L (ref 45–117)
ALT SERPL-CCNC: 15 U/L (ref 12–78)
ANION GAP SERPL CALC-SCNC: 7 MMOL/L (ref 5–15)
AST SERPL-CCNC: 21 U/L (ref 15–37)
BASOPHILS # BLD: 0 K/UL (ref 0–0.1)
BASOPHILS NFR BLD: 0 % (ref 0–1)
BILIRUB SERPL-MCNC: 0.4 MG/DL (ref 0.2–1)
BUN SERPL-MCNC: 7 MG/DL (ref 6–20)
BUN/CREAT SERPL: 9 (ref 12–20)
CALCIUM SERPL-MCNC: 7.9 MG/DL (ref 8.5–10.1)
CHLORIDE SERPL-SCNC: 109 MMOL/L (ref 97–108)
CO2 SERPL-SCNC: 24 MMOL/L (ref 21–32)
CREAT SERPL-MCNC: 0.8 MG/DL (ref 0.7–1.3)
DIFFERENTIAL METHOD BLD: ABNORMAL
EOSINOPHIL # BLD: 0.2 K/UL (ref 0–0.4)
EOSINOPHIL NFR BLD: 2 % (ref 0–7)
ERYTHROCYTE [DISTWIDTH] IN BLOOD BY AUTOMATED COUNT: 11.8 % (ref 11.5–14.5)
GLOBULIN SER CALC-MCNC: 4 G/DL (ref 2–4)
GLUCOSE BLD STRIP.AUTO-MCNC: 104 MG/DL (ref 65–100)
GLUCOSE BLD STRIP.AUTO-MCNC: 141 MG/DL (ref 65–100)
GLUCOSE BLD STRIP.AUTO-MCNC: 144 MG/DL (ref 65–100)
GLUCOSE BLD STRIP.AUTO-MCNC: 152 MG/DL (ref 65–100)
GLUCOSE SERPL-MCNC: 108 MG/DL (ref 65–100)
HCT VFR BLD AUTO: 31.2 % (ref 36.6–50.3)
HGB BLD-MCNC: 11 G/DL (ref 12.1–17)
IMM GRANULOCYTES # BLD: 0.1 K/UL (ref 0–0.04)
IMM GRANULOCYTES NFR BLD AUTO: 1 % (ref 0–0.5)
LYMPHOCYTES # BLD: 2.3 K/UL (ref 0.8–3.5)
LYMPHOCYTES NFR BLD: 25 % (ref 12–49)
MCH RBC QN AUTO: 32.8 PG (ref 26–34)
MCHC RBC AUTO-ENTMCNC: 35.3 G/DL (ref 30–36.5)
MCV RBC AUTO: 93.1 FL (ref 80–99)
MONOCYTES # BLD: 1.1 K/UL (ref 0–1)
MONOCYTES NFR BLD: 12 % (ref 5–13)
NEUTS SEG # BLD: 5.4 K/UL (ref 1.8–8)
NEUTS SEG NFR BLD: 60 % (ref 32–75)
NRBC # BLD: 0 K/UL (ref 0–0.01)
NRBC BLD-RTO: 0 PER 100 WBC
PLATELET # BLD AUTO: 77 K/UL (ref 150–400)
POTASSIUM SERPL-SCNC: 3.8 MMOL/L (ref 3.5–5.1)
PROT SERPL-MCNC: 6.1 G/DL (ref 6.4–8.2)
RBC # BLD AUTO: 3.35 M/UL (ref 4.1–5.7)
SERVICE CMNT-IMP: ABNORMAL
SODIUM SERPL-SCNC: 140 MMOL/L (ref 136–145)
WBC # BLD AUTO: 9.1 K/UL (ref 4.1–11.1)

## 2019-01-07 PROCEDURE — 74011000258 HC RX REV CODE- 258: Performed by: HOSPITALIST

## 2019-01-07 PROCEDURE — 74011250636 HC RX REV CODE- 250/636: Performed by: INTERNAL MEDICINE

## 2019-01-07 PROCEDURE — 85025 COMPLETE CBC W/AUTO DIFF WBC: CPT

## 2019-01-07 PROCEDURE — 74011250637 HC RX REV CODE- 250/637: Performed by: INTERNAL MEDICINE

## 2019-01-07 PROCEDURE — 74011250637 HC RX REV CODE- 250/637: Performed by: GENERAL ACUTE CARE HOSPITAL

## 2019-01-07 PROCEDURE — 74011250636 HC RX REV CODE- 250/636: Performed by: HOSPITALIST

## 2019-01-07 PROCEDURE — 74011636637 HC RX REV CODE- 636/637: Performed by: INTERNAL MEDICINE

## 2019-01-07 PROCEDURE — 80053 COMPREHEN METABOLIC PANEL: CPT

## 2019-01-07 PROCEDURE — 77030010545

## 2019-01-07 PROCEDURE — 36415 COLL VENOUS BLD VENIPUNCTURE: CPT

## 2019-01-07 PROCEDURE — 65660000000 HC RM CCU STEPDOWN

## 2019-01-07 PROCEDURE — 82962 GLUCOSE BLOOD TEST: CPT

## 2019-01-07 RX ORDER — HYDRALAZINE HYDROCHLORIDE 20 MG/ML
10 INJECTION INTRAMUSCULAR; INTRAVENOUS
Status: DISCONTINUED | OUTPATIENT
Start: 2019-01-07 | End: 2019-01-09 | Stop reason: HOSPADM

## 2019-01-07 RX ORDER — QUETIAPINE FUMARATE 25 MG/1
25 TABLET, FILM COATED ORAL
Status: DISCONTINUED | OUTPATIENT
Start: 2019-01-07 | End: 2019-01-08

## 2019-01-07 RX ORDER — QUETIAPINE FUMARATE 25 MG/1
25 TABLET, FILM COATED ORAL DAILY
Status: DISCONTINUED | OUTPATIENT
Start: 2019-01-08 | End: 2019-01-08

## 2019-01-07 RX ADMIN — METOPROLOL TARTRATE 75 MG: 50 TABLET ORAL at 21:46

## 2019-01-07 RX ADMIN — HEPARIN SODIUM 5000 UNITS: 5000 INJECTION INTRAVENOUS; SUBCUTANEOUS at 21:46

## 2019-01-07 RX ADMIN — METOPROLOL TARTRATE 75 MG: 50 TABLET ORAL at 10:49

## 2019-01-07 RX ADMIN — PRAVASTATIN SODIUM 40 MG: 40 TABLET ORAL at 09:44

## 2019-01-07 RX ADMIN — BENZTROPINE MESYLATE 1 MG: 1 TABLET ORAL at 21:46

## 2019-01-07 RX ADMIN — DIVALPROEX SODIUM 1000 MG: 250 TABLET, EXTENDED RELEASE ORAL at 09:43

## 2019-01-07 RX ADMIN — CEFTRIAXONE SODIUM 2 G: 2 INJECTION, POWDER, FOR SOLUTION INTRAMUSCULAR; INTRAVENOUS at 04:20

## 2019-01-07 RX ADMIN — FERROUS SULFATE TAB 325 MG (65 MG ELEMENTAL FE) 325 MG: 325 (65 FE) TAB at 17:12

## 2019-01-07 RX ADMIN — OXCARBAZEPINE 300 MG: 300 TABLET ORAL at 09:44

## 2019-01-07 RX ADMIN — HEPARIN SODIUM 5000 UNITS: 5000 INJECTION INTRAVENOUS; SUBCUTANEOUS at 04:20

## 2019-01-07 RX ADMIN — INSULIN LISPRO 2 UNITS: 100 INJECTION, SOLUTION INTRAVENOUS; SUBCUTANEOUS at 17:12

## 2019-01-07 RX ADMIN — QUETIAPINE FUMARATE 50 MG: 25 TABLET ORAL at 09:43

## 2019-01-07 RX ADMIN — INSULIN LISPRO 2 UNITS: 100 INJECTION, SOLUTION INTRAVENOUS; SUBCUTANEOUS at 13:55

## 2019-01-07 RX ADMIN — ASPIRIN 81 MG: 81 TABLET, COATED ORAL at 09:44

## 2019-01-07 RX ADMIN — OXCARBAZEPINE 150 MG: 300 TABLET ORAL at 17:12

## 2019-01-07 RX ADMIN — Medication 10 ML: at 21:46

## 2019-01-07 RX ADMIN — AMLODIPINE BESYLATE 10 MG: 5 TABLET ORAL at 09:44

## 2019-01-07 RX ADMIN — FERROUS SULFATE TAB 325 MG (65 MG ELEMENTAL FE) 325 MG: 325 (65 FE) TAB at 09:44

## 2019-01-07 RX ADMIN — QUETIAPINE FUMARATE 25 MG: 25 TABLET ORAL at 21:46

## 2019-01-07 RX ADMIN — HEPARIN SODIUM 5000 UNITS: 5000 INJECTION INTRAVENOUS; SUBCUTANEOUS at 13:55

## 2019-01-07 RX ADMIN — LISINOPRIL 40 MG: 20 TABLET ORAL at 09:44

## 2019-01-07 RX ADMIN — BENZTROPINE MESYLATE 1 MG: 1 TABLET ORAL at 09:43

## 2019-01-07 RX ADMIN — TAMSULOSIN HYDROCHLORIDE 0.4 MG: 0.4 CAPSULE ORAL at 09:44

## 2019-01-07 RX ADMIN — DOCUSATE SODIUM 100 MG: 100 CAPSULE, LIQUID FILLED ORAL at 09:44

## 2019-01-07 NOTE — PROGRESS NOTES
CM called and left message with Caterina Price ([phone])  at the group home. CM will continue to follow pt for discharge planning needs. Parmjit Pandey, [address]

## 2019-01-07 NOTE — PROGRESS NOTES
Very sleepy today. Order to decrease night dose Seroquel to 50mg. Awakened enough to eat dinner. Previously falling asleep for breakfast and dinner with food in his mouth. Turned every 2 hours.

## 2019-01-07 NOTE — PROGRESS NOTES
Hospitalist Progress Note NAME: Valerie Ortiz :  1950 MRN:  [id number] Assessment / Plan: 
Acute metabolic encephalopathy secondary to sepsis due to UTI - improving ? Baseline dementia 
-CT a/p with no acute abnormality 
-CT head neg, CXR neg for acute cardiopulmonary disease 
-Ucx growing enterobacter aerogenes, sensitive to rocephin 
-will cont' IV rocephin, transition to PO abx on discharge to complete 10 days 
-stop IVF, tolerating po intake 
-avoid sedating meds Hypokalemia 
-repleted, wnl today T2DM 
-hold metformin 
-cont' SSI 
  
Hx CVA 
-chronic R-sided deficits 
-cont asa, statin 
  
HTN 
-cont' norvasc, lisinopril resuming metoprolol today 
-stop IVF 
  
Seizures 
-cont' trileptal and depakote 
-some bladder incontinence but this is chronic 
  
Prior tobacco use  
Schizophrenia 
-cont seroquel at lower dose 
  
Urinary retention 
-cont flomax 25.0 - 29.9 Overweight / Body mass index is 26.92 kg/m². Code status: Full Prophylaxis: Heparin SQ Recommended Disposition: TBD Subjective: Chief Complaint / Reason for Physician Visit Awake but confused. Unclear baseline mentation. Discussed with RN events overnight. Review of Systems: 
Symptom Y/N Comments  Symptom Y/N Comments Fever/Chills    Chest Pain Poor Appetite    Edema Cough    Abdominal Pain Sputum    Joint Pain SOB/HOLLINS    Pruritis/Rash Nausea/vomit    Tolerating PT/OT Diarrhea    Tolerating Diet Constipation    Other Could NOT obtain due to: confused Objective: VITALS:  
Last 24hrs VS reviewed since prior progress note. Most recent are: 
Patient Vitals for the past 24 hrs: 
 Temp Pulse Resp BP SpO2  
19 0737 97.8 °F (36.6 °C) 62 18 159/80 100 % 19 0405 97.6 °F (36.4 °C) 74 18 158/69 100 % 19 0013 97.7 °F (36.5 °C) 70 18 156/79 100 % 19 1903 97.6 °F (36.4 °C) 71 18 135/65 100 % 19 1729 98.4 °F (36.9 °C) 70 18 143/71 100 % 01/06/19 1317 99 °F (37.2 °C) 70 18 146/76 100 % Intake/Output Summary (Last 24 hours) at 1/7/2019 1019 Last data filed at 1/6/2019 1800 Gross per 24 hour Intake 650 ml Output  Net 650 ml PHYSICAL EXAM: 
General: Drowsy, arousable, confused EENT:  EOMI. Anicteric sclerae. MMM Resp:  CTA bilaterally, no wheezing or rales. No accessory muscle use CV:  Regular  rhythm,  No edema GI:  Soft, Non distended, Non tender.  +Bowel sounds Neurologic:  Alert and oriented X 1 person Psych:   Unable to do Skin:  No rashes. No jaundice Reviewed most current lab test results and cultures  YES Reviewed most current radiology test results   YES Review and summation of old records today    NO Reviewed patient's current orders and MAR    YES 
PMH/SH reviewed - no change compared to H&P 
________________________________________________________________________ Care Plan discussed with: 
  Comments Patient x Family RN x Care Manager Consultant Multidiciplinary team rounds were held today with , nursing, pharmacist and clinical coordinator. Patient's plan of care was discussed; medications were reviewed and discharge planning was addressed. ________________________________________________________________________ Total NON critical care TIME:  35   Minutes Total CRITICAL CARE TIME Spent:   Minutes non procedure based Comments >50% of visit spent in counseling and coordination of care    
________________________________________________________________________ Adriana Powell MD  
 
Procedures: see electronic medical records for all procedures/Xrays and details which were not copied into this note but were reviewed prior to creation of Plan. LABS: 
I reviewed today's most current labs and imaging studies. Pertinent labs include: 
Recent Labs 01/07/19 
0428 01/06/19 
0334 01/05/19 
0001 WBC 9.1 11.2* 13.3*  
 HGB 11.0* 11.2* 11.2* HCT 31.2* 31.7* 31.7* PLT 77* 72* 82* Recent Labs 01/07/19 
0428 01/06/19 
0334 01/05/19 
0001 01/04/19 
1443  140 141 143  
K 3.8 3.3* 3.7 3.6 * 106 107 105 CO2 24 26 26 32 * 140* 159* 107* BUN 7 9 10 10 CREA 0.80 0.93 0.97 1.10 CA 7.9* 7.7* 8.1* 8.6 MG  --   --   --  1.6 ALB 2.1* 2.3*  --  3.2* TBILI 0.4 0.4  --  0.7 SGOT 21 20  --  26 ALT 15 14  --  23 INR  --   --   --  1.1 Signed: Darlin Waters MD

## 2019-01-07 NOTE — PROGRESS NOTES
0715: More alert this morning talking, making jokes. PM seroquel held overnight. 1100: Received morning dose Seroquel 50mg po and once again very lethargic unable to feed himself or be fed. Incontinent falling asleep with food in his mouth. Received order to decrease to 25mg po bid.

## 2019-01-07 NOTE — PROGRESS NOTES
PCU SHIFT NURSING NOTE Bedside shift change report given to [facility] (oncoming nurse) by Jenni Barriga (offgoing nurse). Report included the following information SBAR, Intake/Output, MAR and Cardiac Rhythm NSR. Shift Summary: 8516 Patient POA claudia at bedside stated she did not want him taking the night time dose of Seroquel because it is making him too sleepy, stated he takes Haldol 10mg BID at home Patient rested well through the night wakes up to voice Admission Date 1/4/2019 Admission Diagnosis Acute metabolic encephalopathy UTI (urinary tract infection) Consults IP CONSULT TO HOSPITALIST Consults []PT []OT []Speech  
[]Case Management  
  
[] Palliative Cardiac Monitoring Order []Yes []No  
 
IV drips []Yes Drip:                            Dose: 
Drip:                            Dose: 
Drip:                            Dose:  
[]No  
 
GI Prophylaxis []Yes []No  
 
 
 
DVT Prophylaxis SCDs:     
     
 Gutierrez stockings:     
  
[] Medication []Contraindicated []None Activity Level Activity Level: Bed Rest   
 Activity Assistance: Complete care Purposeful Rounding every 1-2 hour? []Yes Wilson Score  Total Score: 4 Bed Alarm (If score 3 or >) []Yes  
[] Refused (See signed refusal form in chart) Buster Score  Buster Score: 12 Buster Score (if score 14 or less) []PMT consult  
[]Wound Care consult []Specialty bed  
[] Nutrition consult Needs prior to discharge:  
Home O2 required:   
[]Yes []No  
 If yes, how much O2 required? Other:  
 Last Bowel Movement:    
  
Influenza Vaccine Received Flu Vaccine for Current Season (usually Sept-March): No  
 Patient/Guardian Refused (Notify MD): No  
Pneumonia Vaccine Diet Active Orders Diet DIET CARDIAC Regular LDAs Peripheral IV 01/04/19 Left Antecubital (Active) Site Assessment Clean, dry, & intact 1/6/2019  7:03 PM  
 Phlebitis Assessment 0 1/6/2019  7:03 PM  
Infiltration Assessment 0 1/6/2019  7:03 PM  
Dressing Status Clean, dry, & intact 1/6/2019  7:03 PM  
Dressing Type Transparent;Tape 1/6/2019  7:03 PM  
Hub Color/Line Status Pink; Infusing 1/6/2019  7:03 PM  
                  
Urinary Catheter Intake & Output Date 01/06/19 0700 - 01/07/19 0659 01/07/19 0700 - 01/08/19 9374 Shift 0700-1859 1900-0659 24 Hour Total 7411-2417 8102-7709 24 Hour Total  
INTAKE  
P.O. 650  650     
  P. O. 650  650 Shift Total(mL/kg) 650(8.1)  650(8.1) OUTPUT Urine(mL/kg/hr) Urine Occurrence(s) 2 x  2 x Shift Total(mL/kg)   650 Weight (kg) 80.2 80.3 80.3 80.3 80.3 80.3 Readmission Risk Assessment Tool Score Medium Risk 15 Total Score 3 Has Seen PCP in Last 6 Months (Yes=3, No=0)  
 4 Pt. Coverage (Medicare=5 , Medicaid, or Self-Pay=4) 6 Charlson Comorbidity Score (Age + Comorbid Conditions) Criteria that do not apply:  
 . Living with Significant Other. Assisted Living. LTAC. SNF. or  
Rehab Patient Length of Stay (>5 days = 3) IP Visits Last 12 Months (1-3=4, 4=9, >4=11) Expected Length of Stay 4d 19h Actual Length of Stay 3

## 2019-01-07 NOTE — ACP (ADVANCE CARE PLANNING)
Spiritual Care Assessment/Progress Note  [facility]      NAME: Thi Coombs      MRN: [id number]  AGE: 76 y.o. SEX: male  Episcopal Affiliation: Pentecostalism   Language: English     1/7/2019           Spiritual Assessment begun in [facility] 2 PROGRESSIVE CARE through conversation with:         []Patient        [] Family    [] Friend(s)              Spiritual beliefs: (Please include comment if needed)     [] Identifies with a yulissa tradition:         [] Supported by a yulissa community:            [] Claims no spiritual orientation:           [] Seeking spiritual identity:                [] Adheres to an individual form of spirituality:           [x] Not able to assess:                           Identified resources for coping:      [] Prayer                               [] Music                  [] Guided Imagery     [] Family/friends                 [] Pet visits     [] Devotional reading                         [x] Unknown     [] Other:                                             Interventions offered during this visit: (See comments for more details)                Plan of Care:     [x] Support spiritual and/or cultural needs    [] Support AMD and/or advance care planning process      [] Support grieving process   [] Coordinate Rites and/or Rituals    [] Coordination with community clergy   [] No spiritual needs identified at this time   [] Detailed Plan of Care below (See Comments)  [] Make referral to Music Therapy  [] Make referral to Pet Therapy     [] Make referral to Addiction services  [] Make referral to [facility]  [] Make referral to Spiritual Care Partner  [] No future visits requested        [] Follow up visits as needed     Comments: In response to in basket request for Advance Medical Directive consult, reviewed patient's chart. Doctor's note from this morning states patient is alert and oriented only to self. Unable to consult with patient at this time.

## 2019-01-07 NOTE — ADT AUTH CERT NOTES
Facility Name: [facility]  NPI # [id number] MD Bharat Donato NPI # [id number] 
           
  
  
  
  
  
   
Patient Demographics Patient Name Effie Jones Sex Male  
1950 Address [address] [address] [facility] [address] Phone [phone] (Home) Hospital Account Name Acct ID Class Status Primary Coverage Effie Jones [id number] [facility]ly [facility] [place]  
  
   
Guarantor Account (for Hospital Account [de-identified]) Name Relation to Pt Service Area Active? Acct Type Evette Jose A Self BONSC Yes Personal/Family Address Phone [address] [facility] [place] [facility] [phone](A)    
  
   
Coverage Information (for Hospital Account [de-identified]) F/O Payor/Plan Subscriber  Subscriber Sex Precert # [facility] [place] OF [place] 50 M Subscriber Subscriber # Effie Jones [id number] [facility] # Group Name [id number] Address Phone [address] [facility], [address] [facility] [address] Policy Number Status Effective Date Benefits Phone  
[id number] -  - Auth/Cert  
  
  
   
Diagnosis Codes Comments Urinary tract infection without hematuria, site unspecified  ICD-10-CM: N39.0 ICD-9-CM: 599.0   
  
   
Admission Information Arrival Date/Time: 2019 1346 Admit Date/Time: 2019 1346 IP Adm. Date/Time: 2019 1920 Admission Type: Emergency Point of Origin: Non-health Care Facility/self Admit Category:   
Means of Arrival: Ambulance Primary Service: Medicine Secondary Service: N/A Transfer Source:  Service Area: [address] [place] [address] Unit: [facility] 2 PROGRESSIVE CARE Admit Provider: Bharat Donato DO Attending Provider: Good Talbot MD Referring Provider:   
Admission Information Attending Provider Admission Dx Admitted On  
Cait Steiner MD  19 Service Isolation Code Status MEDICINE  Full Code Allergies Advance Care Planning Thorazine [Chlorpromazine] Jump to the Activity    
Admission Information Unit/Bed: [facility] 2 [facility] CARE/01 Service: MEDICINE Admitting provider: Osker Bloch, DO Phone: [phone] Attending provider: Cherylene Leitz, MD Phone: [phone] PCP: Court Nyhan, MD Phone: [phone] Admission dx: Acute metabolic encephalopathy Patient class: I Admission type: ER    
Patient Demographics Patient Name Ephraim Duenas 
[id number] Sex Male  
1950 Address [address] [address] [place] [id number] Phone [phone] (Home) CSN:  
[id number] Admit Date: Admit Time Room Bed 2019  1:46 PM 2241 [69288] 01 [86548] Attending Providers Provider Pager From To  
Yuly Mcknight MD  19 Max Raymundo MD  19 Cherylene Leitz, MD  19 Emergency Contact(s) Name Relation Home Work Mobile Sandra Kimbrough [phone] [facility] [address] [place] [address] Other Relative   [phone] Winifred Briceño   [phone] Celia Glover (In [place]) Sister   [phone] [address] Other Relative   [phone] Utilization Reviews LOC:Acute Adult-Infection: GI//GYN (2019) by Chapo Steven RN Review Entered Review Status 2019 08:08 In Primary Criteria Review REVIEW SUMMARY 
  
Patient: Bonnie Plaza Review Number: [id number] Review Status: In Primary 
  
Condition Specific: Yes 
  
Condition Level Of Care Code: ACUTE Condition Level Of Care Description: Acute 
  
  
OUTCOMES Outcome Type: Primary 
  
  
  
REVIEW DETAILS 
  
Service Date: 2019 Admit Date: 2019 Product: Margaret Sabillon Adult Subset: Infection: GI//GYN 
    (Symptom or finding within 24h) 
  (Excludes PO medications unless noted) [X] Select Day, One: 
            [X] Episode Day 3-8, One: 
                [X] ACUTE, One: [X] Partial responder, not clinically stable for discharge and requires continued stay, >= One: 
                    ~--Admin, IQ Admin Admin on 01- 08:08 AM--~ 
                    Acute metabolic encephalopathy secondary to sepsis due to UTI - improving 
                    -Continue stepdown monitoring 
                    -Continue IVF - decrease rate 
                    -WBC elevated, febrile overnight 
                    -Continue IV abx - agree with increased dose of 2g q24hrs 
                    -Follow Cx 
                     
                    1/6: 
                    Pt more alert and talkative today - still remains confused. Unclear baseline. Spoke with RN who states family have said pt is confused most of the time and is constantly talking about [place] where he grew up. So pt likely has underlying dementia. WBC improving, afebrile overnight, continue IV abx. UCx > 100k GNR. Hypokalemia K 3.3, repleted DM2 Hold metformin Start SSI with POCs Hx CVA Chronic R-sided deficits Cont asa, statin HTN 
                    SBP 140s-150s Holding amlodipine, metoprolol, lisinopril in setting of sepsis, monitor BP restart meds when clinically appropriate 1/5: 
                    -Will resume Norvasc. Continue holding Lisinopril and Metoprolol - likely to resume tmr if pt continues to improve 1/6: 
                    Resumed Lisinopril. Seizures Cont trileptal and depakote No witnessed seizures Some bladder incontinence but this is chronic Prior tobacco use Encourage cont abstinence Schizophrenia Will cont seroquel Urinary retention Cont flomax 25.0 - 29.9 Overweight / Body mass index is 28.89 kg/m². Code status: Full Prophylaxis: Heparin SQ Recommended Disposition: TBD 
                    VS: 100.4, 76, 18, 145/62, 99% RA Labs: wbc 11.2, hgb 11.2, hgb 31.7, plt 72, K 3.3, gluc 140, ca 7.7 Meds: NS @ 50cc/hr IV,  congentin 1mg po q12hrs, rocephin 2g IV q24hrs,  Depakote 1000mg po qd, heparin 5000 units SC, tripleptal 150mg po 2x/d, KCL 10meq IV x 4 [X] Infection unresolved, All: 
                            [X] Infection, >= One: 
                                [X] Acute pyelonephritis <= 5d since admission [X] Finding, >= One: 
                                [X] Temperature > 99.4°F(37.4°C) PO 
                            [X] Anti-infective 
  
Version: Bantam Liveal® 2018.1 Cholo Campos  © 2018 [employer] 6199 and/or one of its [place]. All Rights Reserved. CPT only © 2017 American Medical Association. All Rights Reserved. LOC:Acute Adult-Infection: GI//GYN (1/5/2019) by Shalini Whitaker RN Review Entered Review Status 1/7/2019 08:01 In Primary Criteria Review REVIEW SUMMARY 
  
Patient: Gagandeep Calvin Review Number: [id number] Review Status:  In Primary 
  
Condition Specific: Yes 
  
 Condition Level Of Care Code: ACUTE Condition Level Of Care Description: Acute 
  
  
OUTCOMES Outcome Type: Primary 
  
  
  
REVIEW DETAILS 
  
Service Date: 01/05/2019 Admit Date: 01/04/2019 Product: Erle Patches Adult Subset: Infection: GI//GYN 
    (Symptom or finding within 24h) 
  (Excludes PO medications unless noted) [X] Select Day, One: 
            [X] Episode Day 2, One: 
                [X] ACUTE, One: 
                    [X] Partial responder, not clinically stable for discharge and requires continued stay, >= One: 
                    ~--Admin, IQ Admin Admin on 01- 08:01 AM--~ 
                    Acute metabolic encephalopathy secondary to sepsis due to UTI 
                    -Continue stepdown monitoring 
                    -Continue IVF - decrease rate 
                    -WBC elevated, febrile overnight 
                    -Continue IV abx - agree with increased dose of 2g q24hrs 
                    -Follow Cx  
                    DM2 Hold metformin Start SSI with POCs Hx CVA Chronic R-sided deficits Cont asa, statin HTN 
                    SBP 140s-150s Holding amlodipine, metoprolol, lisinopril in setting of sepsis, monitor BP restart meds when clinically appropriate 1/5: 
                    -Will resume Norvasc. Continue holding Lisinopril and Metoprolol - likely to resume tmr if pt continues to improve Seizures Cont trileptal and depakote No witnessed seizures Some bladder incontinence but this is chronic Prior tobacco use Encourage cont abstinence Schizophrenia Will cont seroquel Urinary retention Cont flomax 25.0 - 29.9 Overweight / Body mass index is 28.89 kg/m². Code status: Full Prophylaxis: Heparin SQ Recommended Disposition: TBD 
                    VS: 103.0, 81, 18, 167/80, 97% Meds: NS @ 50cc/hr IV, Tylenol 650mg po q4hrs PRN x3, congentin 1mg po q12hrs, rocephin 2g IV q24hrs, catapres 0.2mg patch q7days, Depakote 1000mg po qd, heparin 5000 units SC, tripleptal 150mg po 2x/d Labs: wbc 13.3, hgb 11.2, hct 31.7, plt 82, gluc 159, Ca 8.1 [X] Infection unresolved, All: 
                            [X] Infection, >= One: 
                                [X] Acute pyelonephritis <= 5d since admission [X] Finding, >= One: 
                                [X] Temperature > 99.4°F(37.4°C) PO 
                                [X] WBC >= 13,000/cu.mm(13x10 exp 9/L) [X] Severe pain, One: 
                                    [X] Requiring analgesic >= 3x/24h 
                            [X] Anti-infective 
  
Version: InterQual® 2018.1 Sam Hoskins  © 2018 [employer] 6199 and/or one of its [place]. All Rights Reserved. CPT only © 2017 American Medical Association. All Rights Reserved. LOC:Acute Adult-Infection: GI//GYN (1/4/2019) by Osmani Luna RN Review Entered Review Status 1/7/2019 07:55 In Primary Criteria Review REVIEW SUMMARY 
  
Patient: Ty Calderon Review Number: [id number] Review Status:  In Primary 
  
Condition Specific: Yes 
  
 Condition Level Of Care Code: ACUTE Condition Level Of Care Description: Acute 
  
  
OUTCOMES Outcome Type: Primary 
  
  
  
REVIEW DETAILS 
  
Service Date: 01/04/2019 Admit Date: 01/04/2019 Product: Jaycee Reyes Adult Subset: Infection: GI//GYN 
    (Symptom or finding within 24h) 
  (Excludes PO medications unless noted) [X] Select Day, One: 
            [X] Episode Day 1, One: 
                [X] ACUTE, >= One: 
                ~--Admin, IQ Admin Admin on 01- 07:54 AM--~ 
                Acute metabolic encephalopathy Suspect 2/2 sepsis Treat infection and monitor mental status Sepsis 2/2 UTI Leukocytosis, fever DM2 Hold metformin Start SSI with POCs Hx CVA Chronic R-sided deficits Cont asa, statin HTN 
                SBP 140s-150s Holding amlodipine, metoprolol, lisinopril in setting of sepsis, monitor BP restart meds when clinically appropriate Seizures Cont trileptal and depakote No witnessed seizures Some bladder incontinence but this is chronic Prior tobacco use Encourage cont abstinence Schizophrenia Will cont seroquel Urinary retention Cont flomax Code Status: Full Surrogate Decision Maker: DVT Prophylaxis: sq heparin GI Prophylaxis: not indicated                 Baseline: resides at group home, assistance with ADLs by cousin who manages facility VS: 101.5, 87, 191/83, 16, 99% Meds: NS @ 125cc/hr IV, Cogentin 1mg po q12hrs, heparin 5000 units SC q8hrs, trileptal 150mg po 2x/d, NS 500cc IV bolus, rocephin 1g IV Labs: wbc 14.7, hgb 12.9, hct 37.2, plt 91, gluc 107, trop <0.05 
                CT head: IMPRESSION:  
                Volume loss, no acute abnormality EKG: Sinus rhythm with short MO 
                 
                 
                 
                    [X] Acute pyelonephritis, All: 
                        [X] Acute pyelonephritis, actual or suspected [X] Temperature > 99.4°F(37.4°C) PO 
                        [X] Finding, >= One: 
                            [X] Mental status changes (excludes coma, stupor, or obtundation) or Arthur Coma Scale (GCS) 9-14 [X] Pain or discomfort, >= One: 
                            [X] Abdominal pain 
                        [X] Urinalysis, >= One: 
                            [X] >= 10 WBC/mm3 [X] Bacteriuria [X] Leukocyte esterase (+) [X] Intervention, All: 
                            [X] Anti-infective [X] IV fluid, One: 
                                [X] >= 100 mL/h and weight >= 60 kg 
                            [X] Urine culture 
  
Version: TDI Basslineal® 2018.1 Aleth Camera  © 2018 [employer] 6199 and/or one of its [place]. All Rights Reserved. CPT only © 2017 American Medical Association. All Rights Reserved.

## 2019-01-07 NOTE — PROGRESS NOTES
Spiritual Care Assessment/Progress Note [facility] 
 
 
NAME: Amelia Farr      MRN: [id number] AGE: 76 y.o. SEX: male Scientologist Affiliation: Wisam Palomo  
Language: [place] 1/7/2019     Total Time (in minutes): 8 Spiritual Assessment begun in [facility] 2 PROGRESSIVE CARE through conversation with: 
  
    []Patient        [] Family    [] Friend(s) Reason for Consult: Advance medical directive consult Spiritual beliefs: (Please include comment if needed) 
   [] Identifies with a yulissa tradition:     
   [] Supported by a yulissa community:        
   [] Claims no spiritual orientation:       
   [] Seeking spiritual identity:            
   [] Adheres to an individual form of spirituality:       
   [x] Not able to assess:                   
 
    
Identified resources for coping:  
   [] Prayer                           
   [] Music                  [] Guided Imagery 
   [] Family/friends                 [] Pet visits [] Devotional reading                         [x] Unknown 
   [] Other:                                         
 
 
Interventions offered during this visit: (See comments for more details) Patient Interventions: Advance medical directive consult Plan of Care: 
 
 [x] Support spiritual and/or cultural needs  
 [] Support AMD and/or advance care planning process    
 [] Support grieving process 
 [] Coordinate Rites and/or Rituals  
 [] Coordination with community clergy [] No spiritual needs identified at this time 
 [] Detailed Plan of Care below (See Comments)  [] Make referral to Music Therapy 
[] Make referral to Pet Therapy    
[] Make referral to Addiction services 
[] Make referral to [facility] 
[] Make referral to Spiritual Care Partner 
[] No future visits requested       
[] Follow up visits as needed Comments:   In response to in basket request for Advance Medical Directive consult, reviewed patient's chart. Doctor's note from this morning states patient is alert and oriented only to self. Unable to consult with patient at this time. MILIND Bundy, [facility] Service  [phone])

## 2019-01-08 LAB
ANION GAP SERPL CALC-SCNC: 7 MMOL/L (ref 5–15)
BUN SERPL-MCNC: 9 MG/DL (ref 6–20)
BUN/CREAT SERPL: 11 (ref 12–20)
CALCIUM SERPL-MCNC: 8.3 MG/DL (ref 8.5–10.1)
CHLORIDE SERPL-SCNC: 109 MMOL/L (ref 97–108)
CO2 SERPL-SCNC: 26 MMOL/L (ref 21–32)
CREAT SERPL-MCNC: 0.81 MG/DL (ref 0.7–1.3)
GLUCOSE BLD STRIP.AUTO-MCNC: 100 MG/DL (ref 65–100)
GLUCOSE BLD STRIP.AUTO-MCNC: 172 MG/DL (ref 65–100)
GLUCOSE BLD STRIP.AUTO-MCNC: 196 MG/DL (ref 65–100)
GLUCOSE BLD STRIP.AUTO-MCNC: 98 MG/DL (ref 65–100)
GLUCOSE SERPL-MCNC: 108 MG/DL (ref 65–100)
POTASSIUM SERPL-SCNC: 3.9 MMOL/L (ref 3.5–5.1)
SERVICE CMNT-IMP: ABNORMAL
SERVICE CMNT-IMP: ABNORMAL
SERVICE CMNT-IMP: NORMAL
SERVICE CMNT-IMP: NORMAL
SODIUM SERPL-SCNC: 142 MMOL/L (ref 136–145)

## 2019-01-08 PROCEDURE — 82962 GLUCOSE BLOOD TEST: CPT

## 2019-01-08 PROCEDURE — 74011250637 HC RX REV CODE- 250/637: Performed by: INTERNAL MEDICINE

## 2019-01-08 PROCEDURE — 80048 BASIC METABOLIC PNL TOTAL CA: CPT

## 2019-01-08 PROCEDURE — 74011636637 HC RX REV CODE- 636/637: Performed by: INTERNAL MEDICINE

## 2019-01-08 PROCEDURE — 74011250637 HC RX REV CODE- 250/637: Performed by: GENERAL ACUTE CARE HOSPITAL

## 2019-01-08 PROCEDURE — 65660000000 HC RM CCU STEPDOWN

## 2019-01-08 PROCEDURE — 74011250636 HC RX REV CODE- 250/636: Performed by: INTERNAL MEDICINE

## 2019-01-08 PROCEDURE — 36415 COLL VENOUS BLD VENIPUNCTURE: CPT

## 2019-01-08 PROCEDURE — 74011000258 HC RX REV CODE- 258: Performed by: HOSPITALIST

## 2019-01-08 PROCEDURE — 74011250636 HC RX REV CODE- 250/636: Performed by: HOSPITALIST

## 2019-01-08 RX ORDER — HALOPERIDOL 5 MG/1
10 TABLET ORAL
Status: DISCONTINUED | OUTPATIENT
Start: 2019-01-08 | End: 2019-01-09

## 2019-01-08 RX ORDER — HALOPERIDOL 1 MG/1
2 TABLET ORAL DAILY
Status: DISCONTINUED | OUTPATIENT
Start: 2019-01-09 | End: 2019-01-09 | Stop reason: DRUGHIGH

## 2019-01-08 RX ORDER — GUAIFENESIN 100 MG/5ML
81 LIQUID (ML) ORAL DAILY
COMMUNITY

## 2019-01-08 RX ADMIN — BENZTROPINE MESYLATE 1 MG: 1 TABLET ORAL at 09:32

## 2019-01-08 RX ADMIN — METOPROLOL TARTRATE 75 MG: 50 TABLET ORAL at 22:05

## 2019-01-08 RX ADMIN — AMLODIPINE BESYLATE 10 MG: 5 TABLET ORAL at 09:32

## 2019-01-08 RX ADMIN — DIVALPROEX SODIUM 1000 MG: 250 TABLET, EXTENDED RELEASE ORAL at 09:30

## 2019-01-08 RX ADMIN — ASPIRIN 81 MG: 81 TABLET, COATED ORAL at 09:32

## 2019-01-08 RX ADMIN — HEPARIN SODIUM 5000 UNITS: 5000 INJECTION INTRAVENOUS; SUBCUTANEOUS at 20:42

## 2019-01-08 RX ADMIN — Medication 10 ML: at 17:25

## 2019-01-08 RX ADMIN — INSULIN LISPRO 2 UNITS: 100 INJECTION, SOLUTION INTRAVENOUS; SUBCUTANEOUS at 21:41

## 2019-01-08 RX ADMIN — QUETIAPINE FUMARATE 25 MG: 25 TABLET ORAL at 09:32

## 2019-01-08 RX ADMIN — FERROUS SULFATE TAB 325 MG (65 MG ELEMENTAL FE) 325 MG: 325 (65 FE) TAB at 09:32

## 2019-01-08 RX ADMIN — LISINOPRIL 40 MG: 20 TABLET ORAL at 09:28

## 2019-01-08 RX ADMIN — Medication 10 ML: at 22:10

## 2019-01-08 RX ADMIN — INSULIN LISPRO 2 UNITS: 100 INJECTION, SOLUTION INTRAVENOUS; SUBCUTANEOUS at 11:23

## 2019-01-08 RX ADMIN — PRAVASTATIN SODIUM 40 MG: 40 TABLET ORAL at 09:33

## 2019-01-08 RX ADMIN — HEPARIN SODIUM 5000 UNITS: 5000 INJECTION INTRAVENOUS; SUBCUTANEOUS at 05:08

## 2019-01-08 RX ADMIN — OXCARBAZEPINE 150 MG: 300 TABLET ORAL at 17:54

## 2019-01-08 RX ADMIN — Medication 10 ML: at 17:24

## 2019-01-08 RX ADMIN — HALOPERIDOL 10 MG: 5 TABLET ORAL at 22:05

## 2019-01-08 RX ADMIN — FERROUS SULFATE TAB 325 MG (65 MG ELEMENTAL FE) 325 MG: 325 (65 FE) TAB at 17:54

## 2019-01-08 RX ADMIN — DOCUSATE SODIUM 100 MG: 100 CAPSULE, LIQUID FILLED ORAL at 09:28

## 2019-01-08 RX ADMIN — METOPROLOL TARTRATE 75 MG: 50 TABLET ORAL at 09:31

## 2019-01-08 RX ADMIN — BENZTROPINE MESYLATE 1 MG: 1 TABLET ORAL at 22:05

## 2019-01-08 RX ADMIN — TAMSULOSIN HYDROCHLORIDE 0.4 MG: 0.4 CAPSULE ORAL at 09:32

## 2019-01-08 RX ADMIN — CEFTRIAXONE SODIUM 2 G: 2 INJECTION, POWDER, FOR SOLUTION INTRAMUSCULAR; INTRAVENOUS at 05:08

## 2019-01-08 RX ADMIN — HEPARIN SODIUM 5000 UNITS: 5000 INJECTION INTRAVENOUS; SUBCUTANEOUS at 11:16

## 2019-01-08 RX ADMIN — OXCARBAZEPINE 150 MG: 300 TABLET ORAL at 09:29

## 2019-01-08 RX ADMIN — Medication 10 ML: at 05:07

## 2019-01-08 NOTE — PROGRESS NOTES
Reason for Admission:  Acute Metabolic Encephalopathy, UTI  
               
RRAT Score:    13 - moderate risk Do you (patient/family) have any concerns for transition/discharge? No concerns Plan for utilizing home health:   Pending therapy's recommendations Likelihood of readmission? Low Transition of Care Plan:     Return to [address] [place] [address] with f/u appts and pending therapy's recommendations Pt is a 76 y.o  [place] male admitted with Acute Metabolic Encephalopathy, UTI. CM spoke with the [address] [place] [address] , Layla Lee [phone]) and she verified demographic information and all is correct. Pt lives in a group home with 3 residents and the home is a 1 story with 2 steps to the entrance. The  is pt's cousin. Pt receives assistance with his ADL's and IADL's. Pt can feed himself and can partially dress himself. Pt has a walker and uses it at times. Pt's PCP visits pt at the home. Pt had home health in the past from [facility]. Dannielle Glass stated pt wasn't on Seroquel at the group home. Pt was just on Haldol 2mg in the am and 10mg in the pm. CM informed pt's nurse and pharmacist. CM will continue to follow pt for discharge planning needs. Care Management Interventions PCP Verified by CM: Yes(Dr. Robbins) Mode of Transport at Discharge: Other (see comment)(facility or medical transport ) Transition of Care Consult (CM Consult): Discharge Planning Discharge Durable Medical Equipment: No 
Physical Therapy Consult: No 
Occupational Therapy Consult: No 
Speech Therapy Consult: No 
Current Support Network: Adult Group Home([facility] - 1 story with 2 steps to the entrance) Confirm Follow Up Transport: Other (see comment)(facility or medical transport ) Discharge Location Discharge Placement: Group home [facility], [address] [place] [address]

## 2019-01-08 NOTE — PROGRESS NOTES
Problem: Falls - Risk of 
Goal: *Absence of Falls Document Olga Danielle Fall Risk and appropriate interventions in the flowsheet. Outcome: Progressing Towards Goal 
Fall Risk Interventions: 
Mobility Interventions: Bed/chair exit alarm, Patient to call before getting OOB Mentation Interventions: Door open when patient unattended, Bed/chair exit alarm Medication Interventions: Bed/chair exit alarm Elimination Interventions: Call light in reach, Bed/chair exit alarm Problem: Pressure Injury - Risk of 
Goal: *Prevention of pressure injury Document Buster Scale and appropriate interventions in the flowsheet. Outcome: Progressing Towards Goal 
Pressure Injury Interventions: 
Sensory Interventions: Float heels, Pressure redistribution bed/mattress (bed type) Moisture Interventions: Absorbent underpads Activity Interventions: PT/OT evaluation, Pressure redistribution bed/mattress(bed type) Mobility Interventions: HOB 30 degrees or less, Pressure redistribution bed/mattress (bed type), PT/OT evaluation Nutrition Interventions: Document food/fluid/supplement intake Friction and Shear Interventions: HOB 30 degrees or less, Lift sheet

## 2019-01-08 NOTE — PROGRESS NOTES
Hospitalist Progress Note NAME: Hector Pollock :  1950 MRN:  [id number] Assessment / Plan: 
Acute metabolic encephalopathy secondary to sepsis due to UTI - improving ? Baseline dementia 
-CT a/p with no acute abnormality 
-CT head neg, CXR neg for acute cardiopulmonary disease 
-Ucx growing enterobacter aerogenes, sensitive to rocephin 
-will cont' IV rocephin, transition to PO abx on discharge to complete 10 days 
-avoid sedating meds Hypokalemia 
-repleted, wnl today T2DM 
-hold metformin 
-cont' SSI 
  
Hx CVA 
-chronic R-sided deficits 
-cont asa, statin 
  
HTN 
-cont' norvasc, lisinopril resuming metoprolol today 
-stop IVF 
  
Seizures 
-cont' trileptal and depakote 
-some bladder incontinence but this is chronic 
  
Prior tobacco use  
-appreciate pharm's help with med rec. Pt is no longer on seroquel but remains on haldol 
-will stop seroquel (explained his lethargy during the day) 
-resume home dose haldol 
  
Urinary retention 
-cont flomax 25.0 - 29.9 Overweight / Body mass index is 27.19 kg/m². Code status: Full Prophylaxis: Heparin SQ Recommended Disposition: TBD Subjective: Chief Complaint / Reason for Physician Visit Awake, pleasantly confused. Lethargic yesterday so seroquel is now discontinued. He said he is not ready to go home yet until \"the day after tomorrow\". Discussed with RN events overnight. Review of Systems: 
Symptom Y/N Comments  Symptom Y/N Comments Fever/Chills    Chest Pain Poor Appetite    Edema Cough    Abdominal Pain Sputum    Joint Pain SOB/HOLLINS    Pruritis/Rash Nausea/vomit    Tolerating PT/OT Diarrhea    Tolerating Diet Constipation    Other Could NOT obtain due to: confused Objective: VITALS:  
Last 24hrs VS reviewed since prior progress note. Most recent are: 
Patient Vitals for the past 24 hrs: 
 Temp Pulse Resp BP SpO2  
19 0928  (!) 58  185/70  01/08/19 0721 98.3 °F (36.8 °C) (!) 48 16 174/69 (!) 88 % 01/08/19 0328 97.8 °F (36.6 °C) (!) 47 14 158/66 100 % 01/07/19 2329 98.6 °F (37 °C) (!) 55 16 147/76 100 % 01/07/19 2036 98.1 °F (36.7 °C) 62 18 142/63 100 % 01/07/19 1716 98.6 °F (37 °C)  18 132/61 100 % Intake/Output Summary (Last 24 hours) at 1/8/2019 1132 Last data filed at 1/8/2019 0600 Gross per 24 hour Intake 940 ml Output 1250 ml Net -310 ml PHYSICAL EXAM: 
General: Male in bed, NAD 
EENT:  EOMI. Anicteric sclerae. MMM Resp:  CTA bilaterally, no wheezing or rales. No accessory muscle use CV:  Regular  rhythm,  No edema GI:  Soft, Non distended, Non tender.  +Bowel sounds Neurologic:  Alert and oriented X 1 person, follow some commands Psych:   Unable to do Skin:  No rashes. No jaundice Reviewed most current lab test results and cultures  YES Reviewed most current radiology test results   YES Review and summation of old records today    NO Reviewed patient's current orders and MAR    YES 
PMH/ reviewed - no change compared to H&P 
________________________________________________________________________ Care Plan discussed with: 
  Comments Patient x Family RN x Care Manager x Consultant Multidiciplinary team rounds were held today with , nursing, pharmacist and clinical coordinator. Patient's plan of care was discussed; medications were reviewed and discharge planning was addressed. ________________________________________________________________________ Total NON critical care TIME:  35   Minutes Total CRITICAL CARE TIME Spent:   Minutes non procedure based Comments >50% of visit spent in counseling and coordination of care    
________________________________________________________________________ Tamela Mariscal MD  
 
Procedures: see electronic medical records for all procedures/Xrays and details which were not copied into this note but were reviewed prior to creation of Plan. LABS: 
I reviewed today's most current labs and imaging studies. Pertinent labs include: 
Recent Labs 01/07/19 
9956 01/06/19 
5411 WBC 9.1 11.2* HGB 11.0* 11.2* HCT 31.2* 31.7* PLT 77* 72* Recent Labs 01/08/19 
7527 01/07/19 
7259 01/06/19 
3711  140 140  
K 3.9 3.8 3.3*  
* 109* 106 CO2 26 24 26 * 108* 140* BUN 9 7 9 CREA 0.81 0.80 0.93  
CA 8.3* 7.9* 7.7* ALB  --  2.1* 2.3* TBILI  --  0.4 0.4 SGOT  --  21 20 ALT  --  15 14 Signed: Yesi Self MD

## 2019-01-09 ENCOUNTER — HOME HEALTH ADMISSION (OUTPATIENT)
Dept: HOME HEALTH SERVICES | Facility: HOME HEALTH | Age: 69
End: 2019-01-09

## 2019-01-09 VITALS
OXYGEN SATURATION: 98 % | HEIGHT: 68 IN | HEART RATE: 68 BPM | TEMPERATURE: 98.4 F | BODY MASS INDEX: 26.73 KG/M2 | SYSTOLIC BLOOD PRESSURE: 145 MMHG | DIASTOLIC BLOOD PRESSURE: 74 MMHG | RESPIRATION RATE: 18 BRPM | WEIGHT: 176.37 LBS

## 2019-01-09 LAB
GLUCOSE BLD STRIP.AUTO-MCNC: 105 MG/DL (ref 65–100)
GLUCOSE BLD STRIP.AUTO-MCNC: 250 MG/DL (ref 65–100)
SERVICE CMNT-IMP: ABNORMAL
SERVICE CMNT-IMP: ABNORMAL

## 2019-01-09 PROCEDURE — 74011250636 HC RX REV CODE- 250/636: Performed by: INTERNAL MEDICINE

## 2019-01-09 PROCEDURE — 77030037878 HC DRSG MEPILEX >48IN BORD MOLN -B

## 2019-01-09 PROCEDURE — 74011000258 HC RX REV CODE- 258: Performed by: INTERNAL MEDICINE

## 2019-01-09 PROCEDURE — 97535 SELF CARE MNGMENT TRAINING: CPT | Performed by: OCCUPATIONAL THERAPIST

## 2019-01-09 PROCEDURE — 97116 GAIT TRAINING THERAPY: CPT

## 2019-01-09 PROCEDURE — 97161 PT EVAL LOW COMPLEX 20 MIN: CPT

## 2019-01-09 PROCEDURE — 74011250637 HC RX REV CODE- 250/637: Performed by: GENERAL ACUTE CARE HOSPITAL

## 2019-01-09 PROCEDURE — 74011636637 HC RX REV CODE- 636/637: Performed by: INTERNAL MEDICINE

## 2019-01-09 PROCEDURE — 97165 OT EVAL LOW COMPLEX 30 MIN: CPT | Performed by: OCCUPATIONAL THERAPIST

## 2019-01-09 PROCEDURE — 74011250637 HC RX REV CODE- 250/637: Performed by: INTERNAL MEDICINE

## 2019-01-09 PROCEDURE — 97530 THERAPEUTIC ACTIVITIES: CPT | Performed by: OCCUPATIONAL THERAPIST

## 2019-01-09 PROCEDURE — 82962 GLUCOSE BLOOD TEST: CPT

## 2019-01-09 RX ORDER — CEPHALEXIN 500 MG/1
500 CAPSULE ORAL 2 TIMES DAILY
Qty: 10 CAP | Refills: 0 | Status: SHIPPED | OUTPATIENT
Start: 2019-01-09 | End: 2019-01-14

## 2019-01-09 RX ORDER — HALOPERIDOL 5 MG/1
10 TABLET ORAL 2 TIMES DAILY
Status: DISCONTINUED | OUTPATIENT
Start: 2019-01-09 | End: 2019-01-09 | Stop reason: HOSPADM

## 2019-01-09 RX ADMIN — HEPARIN SODIUM 5000 UNITS: 5000 INJECTION INTRAVENOUS; SUBCUTANEOUS at 04:00

## 2019-01-09 RX ADMIN — CEFTRIAXONE SODIUM 2 G: 2 INJECTION, POWDER, FOR SOLUTION INTRAMUSCULAR; INTRAVENOUS at 04:02

## 2019-01-09 RX ADMIN — DIVALPROEX SODIUM 1000 MG: 250 TABLET, EXTENDED RELEASE ORAL at 09:22

## 2019-01-09 RX ADMIN — FERROUS SULFATE TAB 325 MG (65 MG ELEMENTAL FE) 325 MG: 325 (65 FE) TAB at 09:22

## 2019-01-09 RX ADMIN — INSULIN LISPRO 5 UNITS: 100 INJECTION, SOLUTION INTRAVENOUS; SUBCUTANEOUS at 12:32

## 2019-01-09 RX ADMIN — Medication 10 ML: at 04:04

## 2019-01-09 RX ADMIN — DOCUSATE SODIUM 100 MG: 100 CAPSULE, LIQUID FILLED ORAL at 09:24

## 2019-01-09 RX ADMIN — BENZTROPINE MESYLATE 1 MG: 1 TABLET ORAL at 09:22

## 2019-01-09 RX ADMIN — LISINOPRIL 40 MG: 20 TABLET ORAL at 09:24

## 2019-01-09 RX ADMIN — TAMSULOSIN HYDROCHLORIDE 0.4 MG: 0.4 CAPSULE ORAL at 09:24

## 2019-01-09 RX ADMIN — AMLODIPINE BESYLATE 10 MG: 5 TABLET ORAL at 09:24

## 2019-01-09 RX ADMIN — METOPROLOL TARTRATE 75 MG: 50 TABLET ORAL at 09:22

## 2019-01-09 RX ADMIN — ASPIRIN 81 MG: 81 TABLET, COATED ORAL at 09:24

## 2019-01-09 RX ADMIN — HALOPERIDOL 2 MG: 1 TABLET ORAL at 09:22

## 2019-01-09 RX ADMIN — OXCARBAZEPINE 150 MG: 300 TABLET ORAL at 09:24

## 2019-01-09 RX ADMIN — PRAVASTATIN SODIUM 40 MG: 40 TABLET ORAL at 09:24

## 2019-01-09 RX ADMIN — HEPARIN SODIUM 5000 UNITS: 5000 INJECTION INTRAVENOUS; SUBCUTANEOUS at 12:32

## 2019-01-09 NOTE — PROGRESS NOTES
Problem: Mobility Impaired (Adult and Pediatric) Goal: *Acute Goals and Plan of Care (Insert Text) Physical Therapy Goals Initiated 1/9/2019 1. Patient will move from supine to sit and sit to supine  in bed with independence within 7 day(s). 2.  Patient will transfer from bed to chair and chair to bed with independence using the least restrictive device within 7 day(s). 3.  Patient will perform sit to stand with independence within 7 day(s). 4.  Patient will ambulate with modified independence for 250 feet with the least restrictive device within 7 day(s). 5.  Patient will ascend/descend 2 stairs with 1 handrail(s) with supervision/set-up within 7 day(s). physical Therapy EVALUATION Patient: Pablito Ren (86 y.o. male) Date: 1/9/2019 Primary Diagnosis: Acute metabolic encephalopathy UTI (urinary tract infection) Precautions:   Fall ASSESSMENT : 
Based on the objective data described below, the patient presents with decreased strength and mobility. He was cleared by nursing to participate directly following OT session. Patient is a poor historian, per chart pt lives in a small group home with three residents which is overseen by a cousin. Pt states he primarily remains seated and goes from lazy boy to bathroom at home, but recalls about 10 falls in the past. States he uses his walker sometimes but not always because he gets tired and he closes it up. Pt requires up to Min A with gait at times but otherwise overall SBA-CGA for bed mobility, transfers and ambulation. At times during gait he was stepping in place and required cueing to actually move forward. His standing balance was fair to good with AD and fair-poor for short distance without AD and drifting into wall. Pt returned to room and left sitting up in chair with all needs in reach.  Recommend discharge back to group home with follow up HHPT for strength, balance and endurance training to decrease chance of additional falls in the home. Patient will benefit from skilled intervention to address the above impairments. Patients rehabilitation potential is considered to be Good Factors which may influence rehabilitation potential include:  
[]         None noted 
[x]         Mental ability/status []         Medical condition 
[]         Home/family situation and support systems 
[]         Safety awareness 
[]         Pain tolerance/management 
[]         Other: PLAN : 
Recommendations and Planned Interventions: 
[x]           Bed Mobility Training             [x]    Neuromuscular Re-Education 
[x]           Transfer Training                   []    Orthotic/Prosthetic Training 
[x]           Gait Training                         []    Modalities [x]           Therapeutic Exercises           []    Edema Management/Control 
[x]           Therapeutic Activities            [x]    Patient and Family Training/Education 
[]           Other (comment): Frequency/Duration: Patient will be followed by physical therapy  3 times a week to address goals. Discharge Recommendations: Home Health Further Equipment Recommendations for Discharge: None SUBJECTIVE:  
Patient stated I close it up and then I leave it because I like it better closed.  OBJECTIVE DATA SUMMARY:  
HISTORY:   
Past Medical History:  
Diagnosis Date  Diabetes ([facility]  Esophagitis   
 grade 4 - 2/2016  Gastrointestinal disorder GERD  
 GIB (gastrointestinal bleeding) 2/2016 required 2 U PRBC  Hypertension  Psychiatric disorder   
 schizophrenia  Seizures ([facility]  [facility] History reviewed. No pertinent surgical history. Prior Level of Function/Home Situation: Lives in a group home. Merly Ross is the . Walks with a RW Personal factors and/or comorbidities impacting plan of care: diabetes, dementia?, CVA history Home Situation Home Environment: Group home # Steps to Enter: 2 One/Two Story Residence: One story Living Alone: No 
Support Systems: Family member(s) Patient Expects to be Discharged to[de-identified] Private residence Current DME Used/Available at Home: None EXAMINATION/PRESENTATION/DECISION MAKING: Critical Behavior: 
Neurologic State: Alert, Confused Orientation Level: Disoriented to place, Disoriented to situation, Disoriented to time, Oriented to person Cognition: Decreased attention/concentration, Follows commands, Impaired decision making, Memory loss, Poor safety awareness(cueing for processing, initiation and problem solving. ) Safety/Judgement: Decreased awareness of environment, Decreased awareness of need for assistance, Decreased awareness of need for safety, Decreased insight into deficits Hearing: Auditory Auditory Impairment: NoneSkin:   
Edema:  
Range Of Motion: 
AROM: Generally decreased, functional 
  
  
  
  
  
  
  
Strength:   
Strength: Generally decreased, functional 
  
  
  
  
  
  
Tone & Sensation:  
Tone: Abnormal 
  
  
  
  
  
  
  
  
   
Coordination: 
Coordination: Generally decreased, functional 
Vision:  
Acuity: (WFL for distance, glasses for reading) Functional Mobility: 
Bed Mobility: 
Rolling: Supervision; Additional time Supine to Sit: Supervision; Additional time Scooting: Supervision; Additional time Transfers: 
Sit to Stand: Contact guard assistance; Additional time Stand to Sit: Stand-by assistance Bed to Chair: Contact guard assistance(ambulating with a RW) Balance:  
Sitting: Intact Standing: Impaired Standing - Static: Good;Constant support Standing - Dynamic : FairAmbulation/Gait Training:Distance (ft): 112 Feet (ft) Assistive Device: Gait belt;Walker, rolling Ambulation - Level of Assistance: Contact guard assistance;Minimal assistance Gait Abnormalities: Decreased step clearance;Shuffling gait Base of Support: Narrowed Stairs: Therapeutic Exercises:  
 
 
Functional Measure: 
Tinetti test: 
 
Sitting Balance: 1 Arises: 1 Attempts to Rise: 1 Immediate Standing Balance: 1 Standing Balance: 1 Nudged: 0 Eyes Closed: 0 Turn 360 Degrees - Continuous/Discontinuous: 0 Turn 360 Degrees - Steady/Unsteady: 1 Sitting Down: 1 Balance Score: 7 Indication of Gait: 1 
R Step Length/Height: 0 
L Step Length/Height: 0 
R Foot Clearance: 0 
L Foot Clearance: 0 Step Symmetry: 1 Step Continuity: 1 Path: 1 Trunk: 0 Walking Time: 0 Gait Score: 4 Total Score: 11 Tinetti Tool Score Risk of Falls 
<19 = High Fall Risk 19-24 = Moderate Fall Risk 25-28 = Low Fall Risk Tinetti ME. Performance-Oriented Assessment of Mobility Problems in Elderly Patients. [facility]; [id number]. (Scoring Description: PT Bulletin Feb. 10, 1993) Older adults: Dahlia Hashimoto et al, 2009; n = [address] [employer] elderly evaluated with ABC, ROXANA, ADL, and IADL) · Mean ROXANA score for males aged 69-68 years = 26.21(3.40) · Mean ROXANA score for females age 69-68 years = 25.16(4.30) · Mean ROXANA score for males over 80 years = 23.29(6.02) · Mean ROXANA score for females over 80 years = 17.20(8.32) Physical Therapy Evaluation Charge Determination History Examination Presentation Decision-Making MEDIUM  Complexity : 1-2 comorbidities / personal factors will impact the outcome/ POC  MEDIUM Complexity : 3 Standardized tests and measures addressing body structure, function, activity limitation and / or participation in recreation  LOW Complexity : Stable, uncomplicated  Other outcome measures Tinetti  LOW Based on the above components, the patient evaluation is determined to be of the following complexity level: LOW Pain: 
Pain Scale 1: Numeric (0 - 10) Pain Intensity 1: 0 Activity Tolerance:  
Good Please refer to the flowsheet for vital signs taken during this treatment. After treatment: [x]         Patient left in no apparent distress sitting up in chair 
[]         Patient left in no apparent distress in bed 
[x]         Call bell left within reach [x]         Nursing notified 
[]         Caregiver present [x]         Bed alarm activated COMMUNICATION/EDUCATION:  
The patients plan of care was discussed with: Registered Nurse. [x]         Fall prevention education was provided and the patient/caregiver indicated understanding. [x]         Patient/family have participated as able in goal setting and plan of care. [x]         Patient/family agree to work toward stated goals and plan of care. []         Patient understands intent and goals of therapy, but is neutral about his/her participation. []         Patient is unable to participate in goal setting and plan of care. Thank you for this referral. 
Denny Burns, PT Time Calculation: 25 mins

## 2019-01-09 NOTE — PROGRESS NOTES
CM called Ms. Marcie Glez, manager of the group home to inform her of the discharge. Ms. Melinda Bridges was full. CM called pt's sister, Sebastián Franco and informed her of the discharge. She will inform Ms. Marcie Glez. CM set up home health with [facility]. Referral sent via [facility] and they accepted. CM set up medical transport with [facility] and the  time is 1:30pm. CM informed pt's sister. Pt is followed by a PCP that comes to the group home. Case management specialist informed Dr. Sergio Cortez that pt is discharging today. Care Management Interventions PCP Verified by CM: Yes(Dr. Robbins) Mode of Transport at Discharge: BLS([facility] medical transport ) Hospital Transport Time of Discharge: 1330 Transition of Care Consult (CM Consult): Discharge Planning, Group Home Discharge Durable Medical Equipment: No 
Physical Therapy Consult: Yes Occupational Therapy Consult: Yes Speech Therapy Consult: No 
Current Support Network: Adult Group Home([facility] - 1 story with 2 steps to the entrance) Confirm Follow Up Transport: Other (see comment)(medical transport ) Plan discussed with Pt/Family/Caregiver: Yes Freedom of Choice Offered: Yes Discharge Location Discharge Placement: Home with home health Mathis Phalen, [address]

## 2019-01-09 NOTE — DISCHARGE SUMMARY
Discharge Summary      Name: Vlaerie Ortiz  [id number]  YOB: 1950 (Age: 76 y.o.)   Date of Admission: 1/4/2019  Date of Discharge: 1/9/2019  Attending Physician: Mathieu Orantes MD    Discharge Diagnosis:   Acute metabolic encephalopathy secondary to sepsis due to UTI  Baseline dementia  Seizures   Prior tobacco use  Urinary retention    Consultations:  IP CONSULT TO HOSPITALIST    Procedures:     Brief Admission History/Reason for Admission Per Linwood Kinney, DO:     [address] Problems:   Acute metabolic encephalopathy secondary to sepsis due to UTI - improving  Baseline dementia  CT a/p with no acute abnormality. CT head neg, CXR neg for acute cardiopulmonary disease. Ucx growing enterobacter aerogenes, sensitive to rocephin, transitioning to keflex to complete 10 days total.    Hypokalemia, repleted.     T2DM, cont' home meds     Hx CVA, chronic R-sided deficits. Cont' ASA/ statin     HTN, cont' norvasc, lisinopril, metoprolol      Seizures, cont' trileptal and depakote. Some bladder incontinence but this is chronic     Prior tobacco use  Schizophrenia, appreciate pharm's help with med rec. Pt is no longer on seroquel but remains on haldol and congentin. Resume home meds.     Urinary retention, cont flomax     25.0 - 29.9 Overweight / Body mass index is 27.19 kg/m².       Discharge Exam:  Patient seen and examined by me on discharge day. Pertinent Findings:  Visit Vitals  BP (P) 145/74 (BP 1 Location: Right arm, BP Patient Position: Sitting)   Pulse (P) 68   Temp 98.5 °F (36.9 °C)   Resp (P) 18   Ht 5' 8\" (1.727 m)   Wt 80 kg (176 lb 5.9 oz)   SpO2 98%   BMI 26.82 kg/m²     Gen:    Not in distress  Chest: Clear lungs  CVS:   Regular rhythm.   No edema  Abd:  Soft, not distended, not tender    Discharge/Recent Laboratory Results:  Recent Labs     01/08/19  0334      K 3.9   *   CO2 26   BUN 9   *   CA 8.3* Recent Labs     01/07/19  0428   HGB 11.0*   HCT 31.2*   WBC 9.1   PLT 77*       Discharge Medications:  Current Discharge Medication List      START taking these medications    Details   cephALEXin (KEFLEX) 500 mg capsule Take 1 Cap by mouth two (2) times a day for 5 days. Qty: 10 Cap, Refills: 0         CONTINUE these medications which have NOT CHANGED    Details   aspirin 81 mg chewable tablet Take 81 mg by mouth daily. HYDROcodone-acetaminophen (NORCO) 5-325 mg per tablet Take 1 Tab by mouth every eight (8) hours as needed for Pain. Max Daily Amount: 3 Tabs. Qty: 20 Tab, Refills: 0      naproxen (NAPROSYN) 500 mg tablet Take 1 Tab by mouth two (2) times daily (with meals). Qty: 30 Tab, Refills: 0      pantoprazole (PROTONIX) 40 mg tablet Take 40 mg by mouth two (2) times a day. lisinopril (PRINIVIL, ZESTRIL) 40 mg tablet Take 40 mg by mouth daily. bisacodyl (DULCOLAX) 10 mg suppository Insert 10 mg into rectum daily as needed for Other (constipation). Qty: 15 Suppository, Refills: 0      insulin glargine (LANTUS) 100 unit/mL injection 20 Units by SubCUTAneous route nightly. Indications: TYPE 2 DIABETES MELLITUS  Qty: 1 Vial, Refills: 0      haloperidol (HALDOL) 5 mg tablet Take 10 mg by mouth every twelve (12) hours. benztropine (COGENTIN) 1 mg tablet Take 1 mg by mouth every twelve (12) hours. lamoTRIgine (LAMICTAL) 25 mg tablet Take 50 mg by mouth two (2) times a day. OXcarbazepine (TRILEPTAL) 150 mg tablet Take 150 mg by mouth two (2) times a day. divalproex ER (DEPAKOTE ER) 500 mg ER tablet Take 2 Tabs by mouth daily. Qty: 90 Tab, Refills: 0      tamsulosin (FLOMAX) 0.4 mg capsule Take 0.4 mg by mouth daily. oxybutynin chloride XL (DITROPAN XL) 10 mg CR tablet Take 10 mg by mouth daily. docusate sodium (COLACE) 100 mg capsule Take 100 mg by mouth daily. amLODIPine (NORVASC) 10 mg tablet Take 10 mg by mouth daily.       ferrous sulfate 325 mg (65 mg iron) tablet Take 325 mg by mouth two (2) times a day. multivitamin (ONE A DAY) tablet Take 1 Tab by mouth daily. metoprolol (LOPRESSOR) 50 mg tablet Take 75 mg by mouth two (2) times a day. metFORMIN (GLUCOPHAGE) 500 mg tablet Take 500 mg by mouth daily (with breakfast). simvastatin (ZOCOR) 20 mg tablet Take 20 mg by mouth nightly.          STOP taking these medications       aspirin delayed-release 81 mg tablet Comments:   Reason for Stopping:               DISPOSITION:    Home with Family:    Home with HH/PT/OT/RN: PT   SNF/LTC:    BANG:    OTHER:          Follow up with:   PCP : Sarika Harper MD  Follow-up Information     Follow up With Specialties Details Why Contact Info    Sarika Harper MD [facility]   [address]  PT and OT services  [address] [facility] [address]  [phone]          Code: Full  Diet: diabetic/low salt    Total time in minutes spent coordinating this discharge (includes going over instructions, follow-up, prescriptions, and preparing report for sign off to her PCP) :  35 minutes

## 2019-01-09 NOTE — PROGRESS NOTES
Occupational Therapy EVALUATION/discharge Patient: Harman Sen (24 y.o. male) Date: 1/9/2019 Primary Diagnosis: Acute metabolic encephalopathy UTI (urinary tract infection) Precautions:  Fall ASSESSMENT:  
Based on the objective data described below, the patient presents at his functional baseline for ADLs, as he required some assistance PTA 2/2 baseline mild R hemiparesis and cognitive impairments. Patient able to perform ADLs at a supervision to min A level today and was supervision to [place] [address] for functional mobility. Patient requiring increased time for all functional tasks 2/2 to slow processing and delayed initiation. At times delayed initiation noted even when transition to the next step of a single ADL task, such as during LB dressing today. Patient often forgets to use his RW for ambulation and demonstrates decreased safety awareness during ambulation, but he is receptive to cueing and never expressed any form of resistance to using the RW. Further skilled acute occupational therapy is not indicated at this time. Recommend return to group home with the previously level of assistance and supervision for ADLs. Will defer any recommendations related to mobility to PT, but he does appear to be at his mobility baseline as well. Discharge Recommendations: Return to group home. No OT needs Further Equipment Recommendations for Discharge: none OBJECTIVE DATA SUMMARY:  
HISTORY:  
Past Medical History:  
Diagnosis Date  [facility]  Esophagitis   
 grade 4 - 2/2016  Gastrointestinal disorder GERD  
 GIB (gastrointestinal bleeding) 2/2016 required 2 U PRBC  Hypertension  Psychiatric disorder   
 schizophrenia  Seizures ([facility]  Stroke ([facility] History reviewed. No pertinent surgical history.  
 
Prior Level of Function/Environment/Context: Resides in group home, needs some assistance for ADLs, and ambulates with a RW at what sounds like a supervision to CGA level. Patient reports requiring significant time to complete ADLs, but he can perform most ADL with little to no help if given that time. Occupations in which the patient is/was successful, what are the barriers preventing that success:  
Performance Patterns (routines, roles, habits, and rituals):  
Personal Interests and/or values:  
Expanded or extensive additional review of patient history:  
 
Home Situation Home Environment: Private residence # Steps to Enter: 2 One/Two Story Residence: One story Living Alone: No 
Support Systems: Family member(s) Patient Expects to be Discharged to[de-identified] Private residence Current DME Used/Available at Home: None Hand dominance: Right EXAMINATION OF PERFORMANCE DEFICITS: 
Cognitive/Behavioral Status: 
Neurologic State: Alert;Confused Orientation Level: Disoriented to place; Disoriented to situation;Disoriented to time;Oriented to person Cognition: Decreased attention/concentration; Follows commands; Impaired decision making;Memory loss;Poor safety awareness(cueing for processing, initiation and problem solving. ) Perseveration: No perseveration noted Safety/Judgement: Decreased awareness of environment;Decreased awareness of need for assistance;Decreased awareness of need for safety;Decreased insight into deficits SHearing: Auditory Auditory Impairment: None Vision/Perceptual:   
    
    
    
  
    
Acuity: (WFL for distance, glasses for reading) Range of Motion: 
AROM: Generally decreased, functional(RUE and RLE. able to use RUE as active assist for ADLs) Strength: 
Strength: Generally decreased, functional(RUE and RLE. able to use RUE as active assist for ADLs) Coordination: 
Coordination: Generally decreased, functional(RUE and RLE. able to use RUE as active assist for ADLs) Fine Motor Skills-Upper: Right Impaired;Left Intact Gross Motor Skills-Upper: Left Intact; Right Impaired Tone & Sensation: 
Tone: Abnormal(increased RUE flexor tone most notable at rest) Balance: 
Sitting: Intact Standing: Impaired Standing - Static: Good Standing - Dynamic : Fair Functional Mobility and Transfers for ADLs:Bed Mobility: 
Rolling: Supervision; Additional time Supine to Sit: Supervision; Additional time Scooting: Supervision; Additional time Transfers: 
Sit to Stand: Additional time;Stand-by assistance Stand to Sit: Stand-by assistance; Additional time Bed to Chair: Contact guard assistance(ambulating with a RW) Bathroom Mobility: Contact guard assistance(ambulating with a RW) Toilet Transfer : Stand-by assistance; Additional time(using grab bar) ADL Assessment: 
Feeding: Supervision;Setup Oral Facial Hygiene/Grooming: Stand-by assistance Bathing: Stand-by assistance Upper Body Dressing: Stand-by assistance Lower Body Dressing: Minimum assistance Toileting: Minimum assistance Functional Measure: 
Barthel Index: 
 
Bathin Bladder: 0 Bowels: 0 Groomin Dressin Feedin Mobility: 10 Stairs: 0 Toilet Use: 0 Transfer (Bed to Chair and Back): 10 Total: 30 The Barthel ADL Index: Guidelines 1. The index should be used as a record of what a patient does, not as a record of what a patient could do. 2. The main aim is to establish degree of independence from any help, physical or verbal, however minor and for whatever reason. 3. The need for supervision renders the patient not independent. 4. A patient's performance should be established using the best available evidence. Asking the patient, friends/relatives and nurses are the usual sources, but direct observation and common sense are also important. However direct testing is not needed. 5. Usually the patient's performance over the preceding 24-48 hours is important, but occasionally longer periods will be relevant. 6. Middle categories imply that the patient supplies over 50 per cent of the effort. 7. Use of aids to be independent is allowed. Stephany Steele., Barthel, D.W. (1083). Functional evaluation: the Barthel Index. [address] (14)2. Rosalinda Press car SILVESTRE Woods, Damian Laguna., [facility] [place], [facility] (1999). Measuring the change indisability after inpatient rehabilitation; comparison of the responsiveness of the Barthel Index and Functional [place] Measure. Journal of Neurology, Neurosurgery, and Psychiatry, 66(4), 872-301. David Funk, N.J.A, BRIANA Casas, & Dileep Ibanez M.A. (2004.) Assessment of post-stroke quality of life in cost-effectiveness studies: The usefulness of the Barthel Index and the EuroQoL-5D. [facility], 13, 395-19 Activity Tolerance: VSS After treatment:  
[x]  Patient left in no apparent distress sitting up in chair 
[]  Patient left in no apparent distress in bed 
[x]  Call bell left within reach [x]  Nursing notified 
[]  Caregiver present [x]  Bed alarm activated COMMUNICATION/EDUCATION:  
Communication/Collaboration: 
[x]      Home safety education was provided and the patient/caregiver indicated understanding. [x]      Patient/family have participated as able and agree with findings and recommendations. []      Patient is unable to participate in plan of care at this time. Findings and recommendations were discussed with:  Fernando Galeano, OTR/L Time Calculation: 38 mins

## 2019-01-09 NOTE — PROGRESS NOTES
Bedside shift change report given to Va Rodriguez RN (oncoming nurse) by Pj Mayberry RN (offgoing nurse).  Report included the following information SBAR, Kardex, MAR, Recent Results and Cardiac Rhythm SB.

## 2019-01-09 NOTE — FACE TO FACE
Home Health Care Discharge Planning: [facility] Face to Face Encounter NAME: Abdulkadir Warner :  1950 MRN:  [id number] Primary Diagnosis:  
Acute metabolic encephalopathy secondary to sepsis due to UTI Date of Face to Face:  2019 9:55 AM        
                        
Face to Face Encounter findings are related to primary reason for home care:   YES 
 
1. I certify that the patient needs intermittent skilled nursing care, physical therapy and/or speech therapy. I will not be following this patient in the Community and Dr. Bolivar Rojo MD will be responsible for signing the [address] of Care. 2. Initial Orders for Care: Physical Therapy 3. I certify that this patient is homebound because of illness or injury, need the aid of supportive devices such as crutches, canes, wheelchairs, and walkers; the use of special transportation; or the assistance of another person in order to leave their place of residence. There exists a normal inability to leave home and leaving home requires a considerable and taxing effort. 4. I certify that this patient is under my care and that I had a Face-to-Face Encounter that meets the physician Face-to-Face Encounter requirements. Document the physical findings from the Face-to-Face Encounter that support the need for skilled services: Has new diagnosis that requires skilled nursing teaching and intervention  and Has new finding of weakness and altered mobility that requires skilled physical/occupational and/or speech therapy services for evaluation and interventions. Lauren Moreira MD 
Discharging Physician Office:  [phone] Fax:    [phone]

## 2019-01-09 NOTE — PROGRESS NOTES
PCU SHIFT NURSING NOTE Bedside shift change report given to IVANNA Pruitt (oncoming nurse) by Caesar Armendariz (offgoing nurse). Report included the following information SBAR, Recent Results and Cardiac Rhythm NSR/SB. Shift Summary:  
2168  Patient in bed resting. Vitals are stable. 18 Spoke with sister on file r/t flu vaccine for the patient. She stated not to give it because she is unsure if he has already had the flu  Vaccine at the group home. I also tried calling the group home where the patient resides several times and did not get an answer. [address] discharge paperwork. Patient alert to self only, no family nor member of group home available. Copies sent with patient to facility. Admission Date 1/4/2019 Admission Diagnosis Acute metabolic encephalopathy UTI (urinary tract infection) Consults IP CONSULT TO HOSPITALIST Consults []PT []OT []Speech  
[]Case Management  
  
[] Palliative Cardiac Monitoring Order []Yes []No  
 
IV drips []Yes Drip:                            Dose: 
Drip:                            Dose: 
Drip:                            Dose:  
[]No  
 
GI Prophylaxis []Yes []No  
 
 
 
DVT Prophylaxis SCDs:     
     
 Gutierrez stockings:     
  
[] Medication []Contraindicated []None Activity Level Activity Level: Up with Assistance Activity Assistance: Partial (two people) Purposeful Rounding every 1-2 hour? []Yes Wilson Score  Total Score: 4 Bed Alarm (If score 3 or >) []Yes  
[] Refused (See signed refusal form in chart) Buster Score  Buster Score: 14 Buster Score (if score 14 or less) []PMT consult  
[]Wound Care consult []Specialty bed  
[] Nutrition consult Needs prior to discharge:  
Home O2 required:   
[]Yes []No  
 If yes, how much O2 required? Other:  
 Last Bowel Movement:    
  
Influenza Vaccine Received Flu Vaccine for Current Season (usually Sept-March):  No  
 Patient/Guardian Refused (Notify MD): No  
Pneumonia Vaccine Diet Active Orders Diet DIET CARDIAC Regular LDAs Peripheral IV 01/04/19 Left Antecubital (Active) Site Assessment Clean, dry, & intact 1/9/2019  7:15 AM  
Phlebitis Assessment 0 1/9/2019  7:15 AM  
Infiltration Assessment 0 1/9/2019  7:15 AM  
Dressing Status Clean, dry, & intact 1/9/2019  7:15 AM  
Dressing Type Transparent;Tape 1/9/2019  7:15 AM  
Hub Color/Line Status Pink;Capped 1/9/2019  7:15 AM  
Action Taken Open ports on tubing capped 1/9/2019  7:15 AM  
      
Condom Catheter 01/08/19 (Active) Indications for Use Accurate measurement of urinary output 1/9/2019  3:59 AM  
Status Draining 1/9/2019  3:59 AM  
Site Condition No abnormalities 1/9/2019  3:59 AM  
Drainage Tube Clipped to Bed Yes 1/9/2019  3:59 AM  
Catheter Secured to Thigh No 1/9/2019  3:59 AM  
Tamper Seal Intact No 1/9/2019  3:59 AM  
Bag Below Bladder/Not on Floor Yes 1/9/2019  3:59 AM  
Lack of Dependent Loop in Tubing Yes 1/9/2019  3:59 AM  
Drainage Bag Less Than Half Full Yes 1/9/2019  3:59 AM  
Sterile Solution Used for  Irrigation N/A 1/9/2019  3:59 AM  
Urine Output (mL) 625 ml 1/9/2019  3:59 AM  
            
Urinary Catheter Condom Catheter 01/08/19-Indications for Use: Accurate measurement of urinary output Intake & Output Date 01/08/19 0700 - 01/09/19 0659 01/09/19 0700 - 01/10/19 7119 Shift [id number] 1900-0659 24 Hour Total 7244-0476 1629-8835 24 Hour Total  
INTAKE  
P.O.      
  P. O.  Shift Total(mL/kg) 750(9.2) 700(8.8) 1450(18.1) OUTPUT Urine(mL/kg/hr) 400(0.4) 625(0.7) 1025(0.5) Urine Voided 400  400 Urine Occurrence(s) 1 x  1 x Urine Output (mL) (Condom Catheter 01/08/19)  625 625 Shift Total(mL/kg) 400(4.9) 625(7.8) 1025(12.8)  75 425 Weight (kg) 81.1 80 80 80 80 80 Readmission Risk Assessment Tool Score Medium Risk 13      
Total Score 3 Has Seen PCP in Last 6 Months (Yes=3, No=0)  
 4 Pt. Coverage (Medicare=5 , Medicaid, or Self-Pay=4) 6 Charlson Comorbidity Score (Age + Comorbid Conditions) Criteria that do not apply:  
 . Living with Significant Other. Assisted Living. LTAC. SNF. or  
Rehab Patient Length of Stay (>5 days = 3) IP Visits Last 12 Months (1-3=4, 4=9, >4=11) Expected Length of Stay 4d 19h Actual Length of Stay 5

## 2019-01-10 LAB
BACTERIA SPEC CULT: NORMAL
BACTERIA SPEC CULT: NORMAL
SERVICE CMNT-IMP: NORMAL
SERVICE CMNT-IMP: NORMAL

## 2019-01-14 ENCOUNTER — HOME CARE VISIT (OUTPATIENT)
Dept: HOME HEALTH SERVICES | Facility: HOME HEALTH | Age: 69
End: 2019-01-14

## 2019-02-05 ENCOUNTER — APPOINTMENT (OUTPATIENT)
Dept: CT IMAGING | Age: 69
End: 2019-02-05
Attending: PHYSICIAN ASSISTANT
Payer: COMMERCIAL

## 2019-02-05 ENCOUNTER — HOSPITAL ENCOUNTER (EMERGENCY)
Age: 69
Discharge: HOME | End: 2019-02-05
Attending: EMERGENCY MEDICINE
Payer: COMMERCIAL

## 2019-02-05 VITALS
BODY MASS INDEX: 26.67 KG/M2 | SYSTOLIC BLOOD PRESSURE: 132 MMHG | TEMPERATURE: 98 F | OXYGEN SATURATION: 99 % | HEART RATE: 60 BPM | WEIGHT: 176 LBS | RESPIRATION RATE: 16 BRPM | HEIGHT: 68 IN | DIASTOLIC BLOOD PRESSURE: 65 MMHG

## 2019-02-05 DIAGNOSIS — R51.9 ACUTE NONINTRACTABLE HEADACHE, UNSPECIFIED HEADACHE TYPE: Primary | ICD-10-CM

## 2019-02-05 LAB
ALBUMIN SERPL-MCNC: 3.3 G/DL (ref 3.5–5)
ALBUMIN/GLOB SERPL: 0.8 {RATIO} (ref 1.1–2.2)
ALP SERPL-CCNC: 93 U/L (ref 45–117)
ALT SERPL-CCNC: 26 U/L (ref 12–78)
ANION GAP SERPL CALC-SCNC: 7 MMOL/L (ref 5–15)
APPEARANCE UR: CLEAR
AST SERPL-CCNC: 22 U/L (ref 15–37)
BACTERIA URNS QL MICRO: NEGATIVE /HPF
BASOPHILS # BLD: 0 K/UL (ref 0–0.1)
BASOPHILS NFR BLD: 0 % (ref 0–1)
BILIRUB SERPL-MCNC: 0.3 MG/DL (ref 0.2–1)
BILIRUB UR QL CFM: NEGATIVE
BUN SERPL-MCNC: 12 MG/DL (ref 6–20)
BUN/CREAT SERPL: 10 (ref 12–20)
CALCIUM SERPL-MCNC: 8.4 MG/DL (ref 8.5–10.1)
CHLORIDE SERPL-SCNC: 107 MMOL/L (ref 97–108)
CO2 SERPL-SCNC: 28 MMOL/L (ref 21–32)
COLOR UR: ABNORMAL
CREAT SERPL-MCNC: 1.21 MG/DL (ref 0.7–1.3)
DIFFERENTIAL METHOD BLD: ABNORMAL
EOSINOPHIL # BLD: 0.1 K/UL (ref 0–0.4)
EOSINOPHIL NFR BLD: 1 % (ref 0–7)
EPITH CASTS URNS QL MICRO: ABNORMAL /LPF
ERYTHROCYTE [DISTWIDTH] IN BLOOD BY AUTOMATED COUNT: 12.3 % (ref 11.5–14.5)
GLOBULIN SER CALC-MCNC: 4.4 G/DL (ref 2–4)
GLUCOSE SERPL-MCNC: 169 MG/DL (ref 65–100)
GLUCOSE UR STRIP.AUTO-MCNC: NEGATIVE MG/DL
HCT VFR BLD AUTO: 37.1 % (ref 36.6–50.3)
HGB BLD-MCNC: 12.4 G/DL (ref 12.1–17)
HGB UR QL STRIP: NEGATIVE
IMM GRANULOCYTES # BLD AUTO: 0 K/UL (ref 0–0.04)
IMM GRANULOCYTES NFR BLD AUTO: 0 % (ref 0–0.5)
KETONES UR QL STRIP.AUTO: ABNORMAL MG/DL
LEUKOCYTE ESTERASE UR QL STRIP.AUTO: NEGATIVE
LYMPHOCYTES # BLD: 2.8 K/UL (ref 0.8–3.5)
LYMPHOCYTES NFR BLD: 37 % (ref 12–49)
MCH RBC QN AUTO: 32.5 PG (ref 26–34)
MCHC RBC AUTO-ENTMCNC: 33.4 G/DL (ref 30–36.5)
MCV RBC AUTO: 97.4 FL (ref 80–99)
MONOCYTES # BLD: 1 K/UL (ref 0–1)
MONOCYTES NFR BLD: 13 % (ref 5–13)
NEUTS SEG # BLD: 3.7 K/UL (ref 1.8–8)
NEUTS SEG NFR BLD: 49 % (ref 32–75)
NITRITE UR QL STRIP.AUTO: NEGATIVE
NRBC # BLD: 0 K/UL (ref 0–0.01)
NRBC BLD-RTO: 0 PER 100 WBC
PH UR STRIP: 5.5 [PH] (ref 5–8)
PLATELET # BLD AUTO: 100 K/UL (ref 150–400)
PMV BLD AUTO: 13.6 FL (ref 8.9–12.9)
POTASSIUM SERPL-SCNC: 3.8 MMOL/L (ref 3.5–5.1)
PROT SERPL-MCNC: 7.7 G/DL (ref 6.4–8.2)
PROT UR STRIP-MCNC: NEGATIVE MG/DL
RBC # BLD AUTO: 3.81 M/UL (ref 4.1–5.7)
RBC #/AREA URNS HPF: ABNORMAL /HPF (ref 0–5)
SODIUM SERPL-SCNC: 142 MMOL/L (ref 136–145)
SP GR UR REFRACTOMETRY: 1.02 (ref 1–1.03)
UROBILINOGEN UR QL STRIP.AUTO: 2 EU/DL (ref 0.2–1)
WBC # BLD AUTO: 7.6 K/UL (ref 4.1–11.1)
WBC URNS QL MICRO: ABNORMAL /HPF (ref 0–4)

## 2019-02-05 PROCEDURE — 74011250637 HC RX REV CODE- 250/637: Performed by: PHYSICIAN ASSISTANT

## 2019-02-05 PROCEDURE — 70450 CT HEAD/BRAIN W/O DYE: CPT

## 2019-02-05 PROCEDURE — 80053 COMPREHEN METABOLIC PANEL: CPT

## 2019-02-05 PROCEDURE — 81001 URINALYSIS AUTO W/SCOPE: CPT

## 2019-02-05 PROCEDURE — 36415 COLL VENOUS BLD VENIPUNCTURE: CPT

## 2019-02-05 PROCEDURE — 85025 COMPLETE CBC W/AUTO DIFF WBC: CPT

## 2019-02-05 PROCEDURE — 99284 EMERGENCY DEPT VISIT MOD MDM: CPT

## 2019-02-05 RX ORDER — ACETAMINOPHEN 325 MG/1
650 TABLET ORAL
Status: COMPLETED | OUTPATIENT
Start: 2019-02-05 | End: 2019-02-05

## 2019-02-05 RX ADMIN — ACETAMINOPHEN 650 MG: 325 TABLET ORAL at 19:30

## 2019-02-05 NOTE — ED PROVIDER NOTES
EMERGENCY DEPARTMENT HISTORY AND PHYSICAL EXAM 
     
 
Date: 2/5/2019 Patient Name: Leonela Osborn History of Presenting Illness Chief Complaint Patient presents with  
 Other Pt arrives via EMS. EMS states pt was at  today and fell asleep. Pt alert, answering questions appropriately. pt denies any pain or other symptoms. Per EMS, they asked caregivers at the  and caregivers report pt is at his baseline and acting appropriately. History Provided By: Patient and Patient's Sister (Ms. Nixon Regalado) HPI: Leonela Osborn is a 71 y.o. male, pmhx seizures, stroke, schizophrenia, who presents via EMS to the ED c/o headache. Pt was at  today and went outside. When he came back in, per staff, he wouldn't lift his legs. When EMS arrived he was at baseline per staff. Sister states that he is baseline now. He had a recent admission for UTI. PCP: Sarah Ness MD 
 
There are no other complaints, changes, or physical findings at this time. Current Outpatient Medications Medication Sig Dispense Refill  aspirin 81 mg chewable tablet Take 81 mg by mouth daily.  HYDROcodone-acetaminophen (NORCO) 5-325 mg per tablet Take 1 Tab by mouth every eight (8) hours as needed for Pain. Max Daily Amount: 3 Tabs. 20 Tab 0  
 naproxen (NAPROSYN) 500 mg tablet Take 1 Tab by mouth two (2) times daily (with meals). 30 Tab 0  
 pantoprazole (PROTONIX) 40 mg tablet Take 40 mg by mouth two (2) times a day.  lisinopril (PRINIVIL, ZESTRIL) 40 mg tablet Take 40 mg by mouth daily.  bisacodyl (DULCOLAX) 10 mg suppository Insert 10 mg into rectum daily as needed for Other (constipation). 15 Suppository 0  
 insulin glargine (LANTUS) 100 unit/mL injection 20 Units by SubCUTAneous route nightly. Indications: TYPE 2 DIABETES MELLITUS 1 Vial 0  
 haloperidol (HALDOL) 5 mg tablet Take 10 mg by mouth every twelve (12) hours.  benztropine (COGENTIN) 1 mg tablet Take 1 mg by mouth every twelve (12) hours.  lamoTRIgine (LAMICTAL) 25 mg tablet Take 50 mg by mouth two (2) times a day.  OXcarbazepine (TRILEPTAL) 150 mg tablet Take 150 mg by mouth two (2) times a day.  divalproex ER (DEPAKOTE ER) 500 mg ER tablet Take 2 Tabs by mouth daily. 90 Tab 0  
 tamsulosin (FLOMAX) 0.4 mg capsule Take 0.4 mg by mouth daily.  oxybutynin chloride XL (DITROPAN XL) 10 mg CR tablet Take 10 mg by mouth daily.  docusate sodium (COLACE) 100 mg capsule Take 100 mg by mouth daily.  amLODIPine (NORVASC) 10 mg tablet Take 10 mg by mouth daily.  ferrous sulfate 325 mg (65 mg iron) tablet Take 325 mg by mouth two (2) times a day.  multivitamin (ONE A DAY) tablet Take 1 Tab by mouth daily.  metoprolol (LOPRESSOR) 50 mg tablet Take 75 mg by mouth two (2) times a day.  metFORMIN (GLUCOPHAGE) 500 mg tablet Take 500 mg by mouth daily (with breakfast).  simvastatin (ZOCOR) 20 mg tablet Take 20 mg by mouth nightly. Past History Past Medical History: 
Past Medical History:  
Diagnosis Date  Diabetes ([facility]  Esophagitis   
 grade 4 - 2/2016  Gastrointestinal disorder GERD  
 GIB (gastrointestinal bleeding) 2/2016 required 2 U PRBC  Hypertension  Psychiatric disorder   
 schizophrenia  Seizures ([facility]  Stroke ([facility] Past Surgical History: No past surgical history on file. Family History: No family history on file. Social History: 
Social History Tobacco Use  Smoking status: Current Every Day Smoker Packs/day: 1.00  Smokeless tobacco: Never Used Substance Use Topics  Alcohol use: No  
  Comment: last drink 13 yrs ago  Drug use: No  
 
 
Allergies: Allergies Allergen Reactions  Thorazine [Chlorpromazine] Other (comments) \"burns head and throat\" Review of Systems Review of Systems Constitutional: Negative for activity change, appetite change, chills, fatigue and fever. HENT: Negative for congestion, dental problem, rhinorrhea and sore throat. Eyes: Negative for photophobia, pain, discharge, redness, itching and visual disturbance. Respiratory: Negative for cough, chest tightness, shortness of breath and wheezing. Cardiovascular: Negative for chest pain and leg swelling. Gastrointestinal: Negative for abdominal distention, abdominal pain, blood in stool, constipation, diarrhea, nausea and vomiting. Genitourinary: Negative for discharge, dysuria, flank pain, hematuria and penile pain. Musculoskeletal: Negative for arthralgias, back pain, gait problem, joint swelling and myalgias. Skin: Negative for color change and rash. Neurological: Positive for headaches. Negative for dizziness, syncope, weakness and numbness. Psychiatric/Behavioral: Negative for confusion and decreased concentration. The patient is not nervous/anxious. Physical Exam  
Physical Exam  
Constitutional: He is oriented to person, place, and time. Vital signs are normal. He appears well-developed and well-nourished. He is cooperative. No distress. Pt in wheelchair HENT:  
Head: Normocephalic and atraumatic. Right Ear: External ear normal.  
Left Ear: External ear normal.  
Nose: Nose normal.  
Mouth/Throat: Oropharynx is clear and moist. No oropharyngeal exudate. Eyes: Conjunctivae and EOM are normal. Pupils are equal, round, and reactive to light. Right eye exhibits no discharge. Left eye exhibits no discharge. No scleral icterus. Neck: Normal range of motion. Neck supple. No tracheal deviation present. Cardiovascular: Normal rate, regular rhythm, normal heart sounds and intact distal pulses. Exam reveals no gallop and no friction rub. No murmur heard.  
Pulmonary/Chest: Effort normal and breath sounds normal. No respiratory distress. He has no wheezes. He has no rales. He exhibits no tenderness. Abdominal: Soft. Musculoskeletal: He exhibits no edema or tenderness. Lymphadenopathy:  
  He has no cervical adenopathy. Neurological: He is alert and oriented to person, place, and time. He displays atrophy. No cranial nerve deficit. Coordination normal. GCS eye subscore is 4. GCS verbal subscore is 5. GCS motor subscore is 6. CN II - XII grossly intact. Atrophy and decreased AROM of right hand consistent with hx of stroke. Skin: Skin is warm and dry. No rash noted. No erythema. Psychiatric: He has a normal mood and affect. His behavior is normal. Judgment and thought content normal.  
Nursing note and vitals reviewed. Diagnostic Study Results Labs - Recent Results (from the past 12 hour(s)) URINALYSIS W/MICROSCOPIC Collection Time: 02/05/19  6:31 PM  
Result Value Ref Range Color DARK YELLOW Appearance CLEAR CLEAR Specific gravity 1.021 1.003 - 1.030    
 pH (UA) 5.5 5.0 - 8.0 Protein NEGATIVE  NEG mg/dL Glucose NEGATIVE  NEG mg/dL Ketone TRACE (A) NEG mg/dL Blood NEGATIVE  NEG Urobilinogen 2.0 (H) 0.2 - 1.0 EU/dL Nitrites NEGATIVE  NEG Leukocyte Esterase NEGATIVE  NEG    
 WBC 0-4 0 - 4 /hpf  
 RBC 0-5 0 - 5 /hpf Epithelial cells FEW FEW /lpf Bacteria NEGATIVE  NEG /hpf  
BILIRUBIN, CONFIRM Collection Time: 02/05/19  6:31 PM  
Result Value Ref Range Bilirubin UA, confirm NEGATIVE  NEG    
CBC WITH AUTOMATED DIFF Collection Time: 02/05/19  6:36 PM  
Result Value Ref Range WBC 7.6 4.1 - 11.1 K/uL  
 RBC 3.81 (L) 4.10 - 5.70 M/uL  
 HGB 12.4 12.1 - 17.0 g/dL HCT 37.1 36.6 - 50.3 % MCV 97.4 80.0 - 99.0 FL  
 MCH 32.5 26.0 - 34.0 PG  
 MCHC 33.4 30.0 - 36.5 g/dL  
 RDW 12.3 11.5 - 14.5 % PLATELET 327 (L) 584 - 400 K/uL MPV 13.6 (H) 8.9 - 12.9 FL  
 NRBC 0.0 0  WBC ABSOLUTE NRBC 0.00 0.00 - 0.01 K/uL NEUTROPHILS 49 32 - 75 % LYMPHOCYTES 37 12 - 49 % MONOCYTES 13 5 - 13 % EOSINOPHILS 1 0 - 7 % BASOPHILS 0 0 - 1 % IMMATURE GRANULOCYTES 0 0.0 - 0.5 % ABS. NEUTROPHILS 3.7 1.8 - 8.0 K/UL  
 ABS. LYMPHOCYTES 2.8 0.8 - 3.5 K/UL  
 ABS. MONOCYTES 1.0 0.0 - 1.0 K/UL  
 ABS. EOSINOPHILS 0.1 0.0 - 0.4 K/UL  
 ABS. BASOPHILS 0.0 0.0 - 0.1 K/UL  
 ABS. IMM. GRANS. 0.0 0.00 - 0.04 K/UL  
 DF AUTOMATED METABOLIC PANEL, COMPREHENSIVE Collection Time: 02/05/19  6:36 PM  
Result Value Ref Range Sodium 142 136 - 145 mmol/L Potassium 3.8 3.5 - 5.1 mmol/L Chloride 107 97 - 108 mmol/L  
 CO2 28 21 - 32 mmol/L Anion gap 7 5 - 15 mmol/L Glucose 169 (H) 65 - 100 mg/dL BUN 12 6 - 20 MG/DL Creatinine 1.21 0.70 - 1.30 MG/DL  
 BUN/Creatinine ratio 10 (L) 12 - 20 GFR est AA >60 >60 ml/min/1.73m2 GFR est non-AA 59 (L) >60 ml/min/1.73m2 Calcium 8.4 (L) 8.5 - 10.1 MG/DL Bilirubin, total 0.3 0.2 - 1.0 MG/DL  
 ALT (SGPT) 26 12 - 78 U/L  
 AST (SGOT) 22 15 - 37 U/L Alk. phosphatase 93 45 - 117 U/L Protein, total 7.7 6.4 - 8.2 g/dL Albumin 3.3 (L) 3.5 - 5.0 g/dL Globulin 4.4 (H) 2.0 - 4.0 g/dL A-G Ratio 0.8 (L) 1.1 - 2.2 Radiologic Studies -  
CT HEAD WO CONT Final Result IMPRESSION: Stable white matter disease. No acute intracranial hemorrhage CT Results  (Last 48 hours) None CXR Results  (Last 48 hours) None Medical Decision Making I am the first provider for this patient. I reviewed the vital signs, available nursing notes, past medical history, past surgical history, family history and social history. Vital Signs-Reviewed the patient's vital signs. Patient Vitals for the past 12 hrs: 
 Temp Pulse Resp BP SpO2  
02/05/19 1430 98 °F (36.7 °C) 60 16 132/65 99 % Records Reviewed: Nursing Notes, Old Medical Records and Ambulance Run Sheet Provider Notes (Medical Decision Making):  
 
 DDx: tension HA, intracranial pathology, UTI, dehydration,  
 
ED Course:  
Initial assessment performed. The patients presenting problems have been discussed, and they are in agreement with the care plan formulated and outlined with them. I have encouraged them to ask questions as they arise throughout their visit. PROGRESS NOTE: 
ED Course as of Feb 05 1929 Tue Feb 05, 2019  
1831 Discussed case with Dr. Ifrah Sage, she advised me to draw basic blood labs as well. [TC] 1855 Reviewed UA results with sister  Eve Rodgers [id number] Reviewed results with Dr. Ifrah Sage, she will see the patient. Reviewed results with sister and patient. Pt was able to ambulate with minimal assistance from me. [TC] ED Course User Index RONA San  
 
 
 
7:31 PM 
Pt has been evaluated/examined by Dr. Ifrah Sage. Pt noted to be feeling better, ready for discharge. Updated pt and/or family on available findings. Will follow up as instructed. All questions have been answered, pt voiced understanding and agreement with plan. Specific return precautions provided as well as instructions to return to the ED should sx worsen at any time. Vital signs stable for discharge. GHADA Goodrich Disposition: 
 
DISCHARGE NOTE: 
7:31 PM 
The patient's results have been reviewed with pt and his sister. They verbally convey their understanding and agreement of the patient's signs, symptoms, diagnosis, treatment, and prognosis. They additionally agree to follow up as recommended in the discharge instructions or to return to the Emergency Room should the patient's condition change prior to their follow-up appointment. The sister verbally agrees with the care-plan and all of their questions have been answered.  The discharge instructions have also been provided to the them along with educational information regarding the patient's diagnosis and a list of reasons why the patient would want to return to the ER prior to their follow-up appointment should their condition change. GHADA Guerrero PLAN: 
1. Current Discharge Medication List  
  
CONTINUE these medications which have NOT CHANGED Details  
aspirin 81 mg chewable tablet Take 81 mg by mouth daily. HYDROcodone-acetaminophen (NORCO) 5-325 mg per tablet Take 1 Tab by mouth every eight (8) hours as needed for Pain. Max Daily Amount: 3 Tabs. Qty: 20 Tab, Refills: 0  
  
naproxen (NAPROSYN) 500 mg tablet Take 1 Tab by mouth two (2) times daily (with meals). Qty: 30 Tab, Refills: 0  
  
pantoprazole (PROTONIX) 40 mg tablet Take 40 mg by mouth two (2) times a day. lisinopril (PRINIVIL, ZESTRIL) 40 mg tablet Take 40 mg by mouth daily. bisacodyl (DULCOLAX) 10 mg suppository Insert 10 mg into rectum daily as needed for Other (constipation). Qty: 15 Suppository, Refills: 0  
  
insulin glargine (LANTUS) 100 unit/mL injection 20 Units by SubCUTAneous route nightly. Indications: TYPE 2 DIABETES MELLITUS Qty: 1 Vial, Refills: 0  
  
haloperidol (HALDOL) 5 mg tablet Take 10 mg by mouth every twelve (12) hours. benztropine (COGENTIN) 1 mg tablet Take 1 mg by mouth every twelve (12) hours. lamoTRIgine (LAMICTAL) 25 mg tablet Take 50 mg by mouth two (2) times a day. OXcarbazepine (TRILEPTAL) 150 mg tablet Take 150 mg by mouth two (2) times a day. divalproex ER (DEPAKOTE ER) 500 mg ER tablet Take 2 Tabs by mouth daily. Qty: 90 Tab, Refills: 0  
  
tamsulosin (FLOMAX) 0.4 mg capsule Take 0.4 mg by mouth daily. oxybutynin chloride XL (DITROPAN XL) 10 mg CR tablet Take 10 mg by mouth daily. docusate sodium (COLACE) 100 mg capsule Take 100 mg by mouth daily. amLODIPine (NORVASC) 10 mg tablet Take 10 mg by mouth daily. ferrous sulfate 325 mg (65 mg iron) tablet Take 325 mg by mouth two (2) times a day. multivitamin (ONE A DAY) tablet Take 1 Tab by mouth daily. metoprolol (LOPRESSOR) 50 mg tablet Take 75 mg by mouth two (2) times a day. metFORMIN (GLUCOPHAGE) 500 mg tablet Take 500 mg by mouth daily (with breakfast). simvastatin (ZOCOR) 20 mg tablet Take 20 mg by mouth nightly. 2.  
Follow-up Information Follow up With Specialties Details Why Contact Info Libra Jose MD [facility]   [address] [place] [address] 
[phone] [facility] EMERGENCY DEPT Emergency Medicine  If symptoms worsen [address] [place] [address] [address] [facility] 
[phone] Return to ED if worse Diagnosis Clinical Impression: 1. Acute nonintractable headache, unspecified headache type This note will not be viewable in [address]

## 2019-02-05 NOTE — ED NOTES
Per caregiver, pt was \"falling asleep\" today while at  and facility decided to call 911. Pt is at baseline according to family and facility.

## 2019-02-06 NOTE — ED NOTES
Discharge instructions given to patient by RONA Donaldson. Verbalized understanding of instructions. Patient discharged without difficulty. Patient discharged in stable condition via ambulation accompanied by family.

## 2019-02-06 NOTE — DISCHARGE INSTRUCTIONS
